# Patient Record
Sex: MALE | Race: BLACK OR AFRICAN AMERICAN | Employment: FULL TIME | URBAN - METROPOLITAN AREA
[De-identification: names, ages, dates, MRNs, and addresses within clinical notes are randomized per-mention and may not be internally consistent; named-entity substitution may affect disease eponyms.]

---

## 2017-03-13 ENCOUNTER — HOSPITAL ENCOUNTER (INPATIENT)
Age: 42
LOS: 3 days | Discharge: HOME | DRG: 842 | End: 2017-03-16
Attending: EMERGENCY MEDICINE | Admitting: FAMILY MEDICINE
Payer: COMMERCIAL

## 2017-03-13 ENCOUNTER — APPOINTMENT (OUTPATIENT)
Dept: GENERAL RADIOLOGY | Age: 42
DRG: 842 | End: 2017-03-13
Attending: PHYSICIAN ASSISTANT
Payer: COMMERCIAL

## 2017-03-13 ENCOUNTER — APPOINTMENT (OUTPATIENT)
Dept: CT IMAGING | Age: 42
DRG: 842 | End: 2017-03-13
Attending: FAMILY MEDICINE
Payer: COMMERCIAL

## 2017-03-13 DIAGNOSIS — M25.461 KNEE EFFUSION, RIGHT: ICD-10-CM

## 2017-03-13 DIAGNOSIS — D72.829 LEUKOCYTOSIS, UNSPECIFIED TYPE: Primary | ICD-10-CM

## 2017-03-13 PROBLEM — R16.1 SPLENOMEGALY: Status: ACTIVE | Noted: 2017-03-13

## 2017-03-13 PROBLEM — D47.1 MYELOPROLIFERATIVE DISORDER (HCC): Status: ACTIVE | Noted: 2017-03-13

## 2017-03-13 PROBLEM — D64.9 ANEMIA: Status: ACTIVE | Noted: 2017-03-13

## 2017-03-13 LAB
ABO + RH BLD: NORMAL
ALBUMIN SERPL BCP-MCNC: 3.3 G/DL (ref 3.4–5)
ALBUMIN/GLOB SERPL: 0.8 {RATIO} (ref 0.8–1.7)
ALP SERPL-CCNC: 90 U/L (ref 45–117)
ALT SERPL-CCNC: 20 U/L (ref 16–61)
ANION GAP BLD CALC-SCNC: 5 MMOL/L (ref 3–18)
APPEARANCE SNV: ABNORMAL
APPEARANCE UR: CLEAR
APTT PPP: 42.3 SEC (ref 23–36.4)
AST SERPL W P-5'-P-CCNC: 19 U/L (ref 15–37)
BACTERIA URNS QL MICRO: ABNORMAL /HPF
BASOPHILS # BLD AUTO: 0 K/UL (ref 0–0.06)
BASOPHILS # BLD: 0 % (ref 0–3)
BILIRUB DIRECT SERPL-MCNC: <0.1 MG/DL (ref 0–0.2)
BILIRUB SERPL-MCNC: 0.3 MG/DL (ref 0.2–1)
BILIRUB UR QL: NEGATIVE
BLASTS NFR BLD: 5 %
BLOOD GROUP ANTIBODIES SERPL: NORMAL
BUN SERPL-MCNC: 16 MG/DL (ref 7–18)
BUN/CREAT SERPL: 9 (ref 12–20)
CALCIUM SERPL-MCNC: 8.5 MG/DL (ref 8.5–10.1)
CHLORIDE SERPL-SCNC: 105 MMOL/L (ref 100–108)
CO2 SERPL-SCNC: 30 MMOL/L (ref 21–32)
COLOR SNV: YELLOW
COLOR UR: YELLOW
CREAT SERPL-MCNC: 1.77 MG/DL (ref 0.6–1.3)
DIFFERENTIAL METHOD BLD: ABNORMAL
EOSINOPHIL # BLD: 0 K/UL (ref 0–0.4)
EOSINOPHIL # FLD: 0 %
EOSINOPHIL NFR BLD: 0 % (ref 0–5)
EPITH CASTS URNS QL MICRO: ABNORMAL /LPF (ref 0–5)
ERYTHROCYTE [DISTWIDTH] IN BLOOD BY AUTOMATED COUNT: 19 % (ref 11.6–14.5)
GLOBULIN SER CALC-MCNC: 4.2 G/DL (ref 2–4)
GLUCOSE SERPL-MCNC: 83 MG/DL (ref 74–99)
GLUCOSE UR STRIP.AUTO-MCNC: NEGATIVE MG/DL
HCT VFR BLD AUTO: 26.9 % (ref 36–48)
HGB BLD-MCNC: 9.3 G/DL (ref 13–16)
HGB UR QL STRIP: NEGATIVE
INR PPP: 1.4 (ref 0.8–1.2)
KETONES UR QL STRIP.AUTO: NEGATIVE MG/DL
LACTATE BLD-SCNC: 1.4 MMOL/L (ref 0.4–2)
LEUKOCYTE ESTERASE UR QL STRIP.AUTO: NEGATIVE
LYMPHOCYTES # BLD AUTO: 2 % (ref 20–51)
LYMPHOCYTES # BLD: 7.8 K/UL (ref 0.8–3.5)
LYMPHOCYTES NFR FLD: 0 %
MAGNESIUM SERPL-MCNC: 2.1 MG/DL (ref 1.8–2.4)
MCH RBC QN AUTO: 30.4 PG (ref 24–34)
MCHC RBC AUTO-ENTMCNC: 34.6 G/DL (ref 31–37)
MCV RBC AUTO: 87.9 FL (ref 74–97)
METAMYELOCYTES NFR BLD MANUAL: 12 %
MONOCYTES # BLD: 7.8 K/UL (ref 0–1)
MONOCYTES NFR BLD AUTO: 2 % (ref 2–9)
MONOCYTES NFR FLD: 5 %
MYELOCYTES NFR BLD MANUAL: 18 %
NEUTS BAND # FLD: 0 %
NEUTS BAND NFR BLD MANUAL: 48 % (ref 0–5)
NEUTS SEG # BLD: 203.8 K/UL (ref 1.8–8)
NEUTS SEG NFR BLD AUTO: 4 % (ref 42–75)
NEUTS SEG NFR FLD: 95 %
NITRITE UR QL STRIP.AUTO: NEGATIVE
PERIPHERAL SMEAR,PSM: NORMAL
PH UR STRIP: 6.5 [PH] (ref 5–8)
PLATELET # BLD AUTO: 525 K/UL (ref 135–420)
PLATELET COMMENTS,PCOM: ABNORMAL
PMV BLD AUTO: 12.6 FL (ref 9.2–11.8)
POTASSIUM SERPL-SCNC: 3.6 MMOL/L (ref 3.5–5.5)
PROMYELOCYTES NFR BLD MANUAL: 9 %
PROT SERPL-MCNC: 7.5 G/DL (ref 6.4–8.2)
PROT UR STRIP-MCNC: 100 MG/DL
PROTHROMBIN TIME: 16.2 SEC (ref 11.5–15.2)
RBC # BLD AUTO: 3.06 M/UL (ref 4.7–5.5)
RBC # SNV: 750 /CU MM
RBC #/AREA URNS HPF: ABNORMAL /HPF (ref 0–5)
RBC MORPH BLD: ABNORMAL
SODIUM SERPL-SCNC: 140 MMOL/L (ref 136–145)
SP GR UR REFRACTOMETRY: 1.02 (ref 1–1.03)
SPECIMEN EXP DATE BLD: NORMAL
SPECIMEN SITE: ABNORMAL
URATE SERPL-MCNC: 8.9 MG/DL (ref 2.6–7.2)
UROBILINOGEN UR QL STRIP.AUTO: 1 EU/DL (ref 0.2–1)
WBC # BLD AUTO: 392 K/UL (ref 4.6–13.2)
WBC # SNV: ABNORMAL /CU MM (ref 0–200)
WBC MORPH BLD: ABNORMAL
WBC URNS QL MICRO: ABNORMAL /HPF (ref 0–4)

## 2017-03-13 PROCEDURE — 85730 THROMBOPLASTIN TIME PARTIAL: CPT | Performed by: PHYSICIAN ASSISTANT

## 2017-03-13 PROCEDURE — 75810000054 HC ARTHOCENTISIS JOINT

## 2017-03-13 PROCEDURE — 85610 PROTHROMBIN TIME: CPT | Performed by: PHYSICIAN ASSISTANT

## 2017-03-13 PROCEDURE — 80048 BASIC METABOLIC PNL TOTAL CA: CPT | Performed by: PHYSICIAN ASSISTANT

## 2017-03-13 PROCEDURE — 83735 ASSAY OF MAGNESIUM: CPT | Performed by: PHYSICIAN ASSISTANT

## 2017-03-13 PROCEDURE — 96374 THER/PROPH/DIAG INJ IV PUSH: CPT

## 2017-03-13 PROCEDURE — 83605 ASSAY OF LACTIC ACID: CPT

## 2017-03-13 PROCEDURE — 84550 ASSAY OF BLOOD/URIC ACID: CPT | Performed by: PHYSICIAN ASSISTANT

## 2017-03-13 PROCEDURE — 71260 CT THORAX DX C+: CPT

## 2017-03-13 PROCEDURE — 0S9C3ZZ DRAINAGE OF RIGHT KNEE JOINT, PERCUTANEOUS APPROACH: ICD-10-PCS | Performed by: EMERGENCY MEDICINE

## 2017-03-13 PROCEDURE — 87040 BLOOD CULTURE FOR BACTERIA: CPT | Performed by: PHYSICIAN ASSISTANT

## 2017-03-13 PROCEDURE — 84560 ASSAY OF URINE/URIC ACID: CPT | Performed by: PHYSICIAN ASSISTANT

## 2017-03-13 PROCEDURE — 73562 X-RAY EXAM OF KNEE 3: CPT

## 2017-03-13 PROCEDURE — 71020 XR CHEST PA LAT: CPT

## 2017-03-13 PROCEDURE — 85025 COMPLETE CBC W/AUTO DIFF WBC: CPT | Performed by: PHYSICIAN ASSISTANT

## 2017-03-13 PROCEDURE — 96375 TX/PRO/DX INJ NEW DRUG ADDON: CPT

## 2017-03-13 PROCEDURE — 74011250636 HC RX REV CODE- 250/636: Performed by: FAMILY MEDICINE

## 2017-03-13 PROCEDURE — 74011636320 HC RX REV CODE- 636/320: Performed by: FAMILY MEDICINE

## 2017-03-13 PROCEDURE — 65270000029 HC RM PRIVATE

## 2017-03-13 PROCEDURE — 86900 BLOOD TYPING SEROLOGIC ABO: CPT | Performed by: PHYSICIAN ASSISTANT

## 2017-03-13 PROCEDURE — 99283 EMERGENCY DEPT VISIT LOW MDM: CPT

## 2017-03-13 PROCEDURE — 81001 URINALYSIS AUTO W/SCOPE: CPT | Performed by: PHYSICIAN ASSISTANT

## 2017-03-13 PROCEDURE — 74011250636 HC RX REV CODE- 250/636

## 2017-03-13 PROCEDURE — 80076 HEPATIC FUNCTION PANEL: CPT | Performed by: PHYSICIAN ASSISTANT

## 2017-03-13 PROCEDURE — 87070 CULTURE OTHR SPECIMN AEROBIC: CPT | Performed by: PHYSICIAN ASSISTANT

## 2017-03-13 PROCEDURE — 74011250636 HC RX REV CODE- 250/636: Performed by: PHYSICIAN ASSISTANT

## 2017-03-13 PROCEDURE — 89050 BODY FLUID CELL COUNT: CPT | Performed by: EMERGENCY MEDICINE

## 2017-03-13 RX ORDER — SODIUM CHLORIDE 9 MG/ML
1000 INJECTION, SOLUTION INTRAVENOUS ONCE
Status: COMPLETED | OUTPATIENT
Start: 2017-03-13 | End: 2017-03-13

## 2017-03-13 RX ORDER — MORPHINE SULFATE 4 MG/ML
4 INJECTION, SOLUTION INTRAMUSCULAR; INTRAVENOUS ONCE
Status: COMPLETED | OUTPATIENT
Start: 2017-03-13 | End: 2017-03-13

## 2017-03-13 RX ORDER — SODIUM CHLORIDE 9 MG/ML
50 INJECTION, SOLUTION INTRAVENOUS CONTINUOUS
Status: DISCONTINUED | OUTPATIENT
Start: 2017-03-13 | End: 2017-03-16

## 2017-03-13 RX ORDER — ONDANSETRON 2 MG/ML
INJECTION INTRAMUSCULAR; INTRAVENOUS
Status: COMPLETED
Start: 2017-03-13 | End: 2017-03-13

## 2017-03-13 RX ORDER — ONDANSETRON 2 MG/ML
4 INJECTION INTRAMUSCULAR; INTRAVENOUS
Status: COMPLETED | OUTPATIENT
Start: 2017-03-13 | End: 2017-03-13

## 2017-03-13 RX ADMIN — ONDANSETRON 4 MG: 2 INJECTION INTRAMUSCULAR; INTRAVENOUS at 13:04

## 2017-03-13 RX ADMIN — IOVERSOL 80 ML: 678 INJECTION INTRA-ARTERIAL; INTRAVENOUS at 15:37

## 2017-03-13 RX ADMIN — SODIUM CHLORIDE 75 ML/HR: 900 INJECTION, SOLUTION INTRAVENOUS at 18:34

## 2017-03-13 RX ADMIN — Medication 4 MG: at 13:04

## 2017-03-13 RX ADMIN — SODIUM CHLORIDE 1000 ML: 9 INJECTION, SOLUTION INTRAVENOUS at 14:20

## 2017-03-13 NOTE — H&P
History and Physical    Patient: Mare Hawkins MRN: [id number]  SSN: [id number]    YOB: 1975  Age: 42 y.o. Sex: male      Subjective:      Mare Hawkins is a 42 y.o. male who has been losing weight up jude 60 lbs despite good appetite. Patient began with right knee swelling 2 days ago without trauma. Patient presented to the ER. On CBC, patient has WBC > 300. Workup suggestive of myeloproliferative disease acute Admit. Past Medical History:   Diagnosis Date    STD (sexually transmitted disease)      Past Surgical History:   Procedure Laterality Date    HX KNEE ARTHROSCOPY        Family History   Problem Relation Age of Onset    Hypertension Maternal Grandmother      Social History   Substance Use Topics    Smoking status: Current Every Day Smoker     Packs/day: 0.25     Years: 8.00     Types: Cigarettes    Smokeless tobacco: Never Used    Alcohol use No      Prior to Admission medications    Not on File        No Known Allergies    Review of Systems:  A comprehensive review of systems was negative except for that written in the History of Present Illness. Objective:     Vitals:    03/13/17 1116   BP: 153/80   Pulse: 93   Resp: 20   Temp: 98.4 °F (36.9 °C)   SpO2: 97%   Weight: 104.3 kg (230 lb)   Height: 6' 8\" (2.032 m)        Physical Exam:  GENERAL: alert, cooperative, no distress, appears stated age  LYMPHATIC: Cervical, supraclavicular, and axillary nodes normal.   THROAT & NECK: normal and no erythema or exudates noted.    LUNG: clear to auscultation bilaterally  HEART: regular rate and rhythm, S1, S2 normal, no murmur, click, rub or gallop  ABDOMEN: abnormal findings:  splenomegaly  EXTREMITIES:  Right knee with effusion    Assessment:     Hospital Problems  Date Reviewed: 3/13/2017          Codes Class Noted POA    Leukocytosis ICD-10-CM: K73.095  ICD-9-CM: 288.60  3/13/2017 Unknown        Knee effusion, right ICD-10-CM: M25.461  ICD-9-CM: 719.06  3/13/2017 Unknown Myeloproliferative disorder [facility]) ICD-10-CM: D47.1  ICD-9-CM: 238.79  3/13/2017 Unknown        Anemia ICD-10-CM: D64.9  ICD-9-CM: 285.9  3/13/2017 Unknown        Splenomegaly ICD-10-CM: R16.1  ICD-9-CM: 789.2  3/13/2017 Unknown              Plan:     Admit. Hematology consulted. Will need bone marrow biopsy.       Signed By: Evie Feng MD     March 13, 2017

## 2017-03-13 NOTE — ED PROVIDER NOTES
HPI Comments: 11:37 AM Drew Nixon is a 42 y.o. male with hx of gout presenting to the ED with R knee pain that began this morning. The patient also reports R knee swelling as an associated symptom. He denies any recent injury or trauma and rates the pain as 9/10. He states the knee was throbbing yesterday and he took a \"pain pill. \" Pt denies fever. He states he had an arthroscopy on the knee in 2014. He has had this swelling before, the knee was swollen one week ago and the swelling subsided. No other complaints at this time. Patient is a 42 y.o. male presenting with knee pain and knee swelling. The history is provided by the patient. Knee Pain    Pertinent negatives include no back pain. Knee Swelling    Pertinent negatives include no back pain. Past Medical History:   Diagnosis Date    STD (sexually transmitted disease)        Past Surgical History:   Procedure Laterality Date    HX KNEE ARTHROSCOPY           Family History:   Problem Relation Age of Onset    Hypertension Maternal Grandmother        Social History     Social History    Marital status: SINGLE     Spouse name: N/A    Number of children: N/A    Years of education: N/A     Occupational History    Not on file. Social History Main Topics    Smoking status: Current Every Day Smoker     Packs/day: 0.25     Years: 8.00     Types: Cigarettes    Smokeless tobacco: Never Used    Alcohol use No    Drug use: No    Sexual activity: Not on file     Other Topics Concern    Not on file     Social History Narrative    No narrative on file         ALLERGIES: Review of patient's allergies indicates no known allergies. Review of Systems   Constitutional: Negative for chills, fatigue, fever and unexpected weight change. HENT: Negative for congestion and rhinorrhea. Respiratory: Negative for chest tightness and shortness of breath. Cardiovascular: Negative for chest pain, palpitations and leg swelling. Gastrointestinal: Negative for abdominal pain, nausea and vomiting. Genitourinary: Negative for dysuria. Musculoskeletal: Positive for arthralgias (R knee pain and swelling) and joint swelling. Negative for back pain. Skin: Negative for rash. Neurological: Negative for dizziness and weakness. Psychiatric/Behavioral: The patient is not nervous/anxious. All other systems reviewed and are negative. Vitals:    03/13/17 1116   BP: 153/80   Pulse: 93   Resp: 20   Temp: 98.4 °F (36.9 °C)   SpO2: 97%   Weight: 104.3 kg (230 lb)   Height: 6' 8\" (2.032 m)            Physical Exam   Constitutional: He is oriented to person, place, and time. He appears well-developed and well-nourished. No distress. HENT:   Head: Normocephalic and atraumatic. Right Ear: External ear normal.   Left Ear: External ear normal.   Nose: Nose normal.   Mouth/Throat: Oropharynx is clear and moist.   Eyes: Conjunctivae and EOM are normal. Pupils are equal, round, and reactive to light. No scleral icterus. Neck: Normal range of motion. Neck supple. No JVD present. No tracheal deviation present. No thyromegaly present. Cardiovascular: Normal rate, regular rhythm, normal heart sounds and intact distal pulses. Exam reveals no gallop and no friction rub. No murmur heard. Pulmonary/Chest: Effort normal and breath sounds normal. He exhibits no tenderness. Abdominal: Soft. Bowel sounds are normal. He exhibits no distension. There is no tenderness. There is no rebound and no guarding. Musculoskeletal:        Right knee: He exhibits decreased range of motion, swelling and effusion. He exhibits no ecchymosis, no deformity, no laceration, no erythema, normal alignment and no LCL laxity. Tenderness found. Lymphadenopathy:     He has no cervical adenopathy. Neurological: He is alert and oriented to person, place, and time. No cranial nerve deficit.  Coordination normal.   No sensory loss, Gait normal, Motor 5/5   Skin: Skin is warm and dry. Psychiatric: He has a normal mood and affect. His behavior is normal. Judgment and thought content normal.   Nursing note and vitals reviewed. MDM  Number of Diagnoses or Management Options  Knee effusion, right:   Leukocytosis, unspecified type:   Diagnosis management comments: Pt presents with right knee swelling, ddx include but not limited to: djd, gout, bursitis. Pt with knee swelling x last night. Pt states he has gout, but has not ingested any usual gout triggers. He has had knee tapped before and is requesting the same. Will check labs, give pain meds, and tap knee. Discussed wth Dr. Geraldine Melchor and he agrees with plan. MICHEL Devine    2:03 PM Received \"panic value\" of . Discussed results with pt. Pt states he is healthy as far as he knows and gets physicals yearly. Pt does admit to 60lbs unintentional weight loss over the past 6 months and admits to intermittent chills. Additional labs and cultures ordered, pt moved to [address] [place] [address] and will call Dr. Wei Regan for admission. Pt agrees with plan. MICHEL Devine    2:13 PM Discussed with Dr Kaylynn Santos, will admit. Requests heme/onc consult. MICHEL Devine    2:29 PM Discussed with Dr. Mart Durbin, will consult.  MICHEL Devine           Amount and/or Complexity of Data Reviewed  Clinical lab tests: ordered and reviewed  Tests in the radiology section of CPT®: ordered and reviewed    Risk of Complications, Morbidity, and/or Mortality  Presenting problems: moderate  Diagnostic procedures: moderate  Management options: moderate    Patient Progress  Patient progress: stable    ED Course       Other Procedure  Date/Time: 3/13/2017 1:57 PM  Performed by: Lisa Guidry  Authorized by: Lisa Guidry     Consent:     Consent obtained:  Written    Consent given by:  Patient    Risks discussed:  Bleeding, infection, pain and incomplete drainage    Alternatives discussed:  Referral  Indications:     Indications:  Right knee swelling  Pre-procedure details:     Skin preparation:  ChloraPrep    Preparation: Patient was prepped and draped in the usual sterile fashion    Anesthesia (see MAR for exact dosages): Anesthesia method:  Local infiltration    Local anesthetic:  Lidocaine 1% w/o epi  Post-procedure details:     Patient tolerance of procedure: Tolerated well, no immediate complications  Comments:      60cc of straw colored fluid aspirated        Vitals:  Patient Vitals for the past 12 hrs:   Temp Pulse Resp BP SpO2   03/13/17 1116 98.4 °F (36.9 °C) 93 20 153/80 97 %       Medications ordered:   Medications - No data to display      Lab findings:  No results found for this or any previous visit (from the past 12 hour(s)). EKG interpretation by ED Physician:      X-Ray, CT or other radiology findings or impressions:  No orders to display       Progress notes, Consult notes or additional Procedure notes:       Disposition:  Diagnosis: No diagnosis found. Disposition:     Follow-up Information     None           Patient's Medications    No medications on file       SCRIBE ATTESTATION STATEMENT  Documented by: Kristina broderick for, and in the presence of, MICHEL Davis     Signed by: Tim Loera, 03/13/17, 11:45 AM    PROVIDER ATTESTATION STATEMENT  I personally performed the services described in the documentation, reviewed the documentation, as recorded by the scribe in my presence, and it accurately and completely records my words and actions.   MICHEL Davis

## 2017-03-13 NOTE — ED TRIAGE NOTES
Patient to ER with c/o right knee pain and swelling, states he woke up this morning with symptoms. Denies any increase in activity or trauma. Ambulatory on arrival. Rates pain 9/10. Denies any allergies.

## 2017-03-14 ENCOUNTER — APPOINTMENT (OUTPATIENT)
Dept: INTERVENTIONAL RADIOLOGY/VASCULAR | Age: 42
DRG: 842 | End: 2017-03-14
Attending: INTERNAL MEDICINE
Payer: COMMERCIAL

## 2017-03-14 LAB
BASOPHILS # BLD AUTO: 3.4 K/UL (ref 0–0.06)
BASOPHILS # BLD: 1 % (ref 0–3)
BLASTS NFR BLD: 6 %
DIFFERENTIAL METHOD BLD: ABNORMAL
EOSINOPHIL # BLD: 6.8 K/UL (ref 0–0.4)
EOSINOPHIL NFR BLD: 2 % (ref 0–5)
ERYTHROCYTE [DISTWIDTH] IN BLOOD BY AUTOMATED COUNT: 19 % (ref 11.6–14.5)
HCT VFR BLD AUTO: 23.2 % (ref 36–48)
HGB BLD-MCNC: 7.9 G/DL (ref 13–16)
LYMPHOCYTES # BLD AUTO: 3 % (ref 20–51)
LYMPHOCYTES # BLD: 10.2 K/UL (ref 0.8–3.5)
MCH RBC QN AUTO: 30 PG (ref 24–34)
MCHC RBC AUTO-ENTMCNC: 34.1 G/DL (ref 31–37)
MCV RBC AUTO: 88.2 FL (ref 74–97)
METAMYELOCYTES NFR BLD MANUAL: 14 %
MONOCYTES # BLD: 6.8 K/UL (ref 0–1)
MONOCYTES NFR BLD AUTO: 2 % (ref 2–9)
MYELOCYTES NFR BLD MANUAL: 11 %
NEUTS BAND NFR BLD MANUAL: 39 % (ref 0–5)
NEUTS SEG # BLD: 170.7 K/UL (ref 1.8–8)
NEUTS SEG NFR BLD AUTO: 11 % (ref 42–75)
NRBC BLD-RTO: 1 PER 100 WBC
PLATELET # BLD AUTO: 493 K/UL (ref 135–420)
PLATELET COMMENTS,PCOM: ABNORMAL
PMV BLD AUTO: 12.1 FL (ref 9.2–11.8)
PROMYELOCYTES NFR BLD MANUAL: 11 %
RBC # BLD AUTO: 2.63 M/UL (ref 4.7–5.5)
RBC MORPH BLD: ABNORMAL
RBC MORPH BLD: ABNORMAL
URATE FLD-MCNC: 9.5 MG/DL
WBC # BLD AUTO: 341.3 K/UL (ref 4.6–13.2)

## 2017-03-14 PROCEDURE — 77030028872 HC BN BIOP NDL ON CNTRL TY TELE -C

## 2017-03-14 PROCEDURE — 74011250636 HC RX REV CODE- 250/636

## 2017-03-14 PROCEDURE — 88311 DECALCIFY TISSUE: CPT | Performed by: RADIOLOGY

## 2017-03-14 PROCEDURE — 74011250636 HC RX REV CODE- 250/636: Performed by: FAMILY MEDICINE

## 2017-03-14 PROCEDURE — 77002 NEEDLE LOCALIZATION BY XRAY: CPT

## 2017-03-14 PROCEDURE — 88184 FLOWCYTOMETRY/ TC 1 MARKER: CPT | Performed by: RADIOLOGY

## 2017-03-14 PROCEDURE — 77030003472 HC NDL BIOP COOK -B

## 2017-03-14 PROCEDURE — 74011250637 HC RX REV CODE- 250/637: Performed by: FAMILY MEDICINE

## 2017-03-14 PROCEDURE — 88305 TISSUE EXAM BY PATHOLOGIST: CPT | Performed by: RADIOLOGY

## 2017-03-14 PROCEDURE — 77010033678 HC OXYGEN DAILY

## 2017-03-14 PROCEDURE — 88185 FLOWCYTOMETRY/TC ADD-ON: CPT | Performed by: RADIOLOGY

## 2017-03-14 PROCEDURE — 65270000029 HC RM PRIVATE

## 2017-03-14 PROCEDURE — 88313 SPECIAL STAINS GROUP 2: CPT | Performed by: RADIOLOGY

## 2017-03-14 PROCEDURE — 85025 COMPLETE CBC W/AUTO DIFF WBC: CPT | Performed by: FAMILY MEDICINE

## 2017-03-14 PROCEDURE — 74011000250 HC RX REV CODE- 250

## 2017-03-14 PROCEDURE — 36415 COLL VENOUS BLD VENIPUNCTURE: CPT | Performed by: FAMILY MEDICINE

## 2017-03-14 PROCEDURE — 77030003666 HC NDL SPINAL BD -A

## 2017-03-14 PROCEDURE — 88264 CHROMOSOME ANALYSIS 20-25: CPT | Performed by: RADIOLOGY

## 2017-03-14 PROCEDURE — 07DR3ZX EXTRACTION OF ILIAC BONE MARROW, PERCUTANEOUS APPROACH, DIAGNOSTIC: ICD-10-PCS | Performed by: RADIOLOGY

## 2017-03-14 PROCEDURE — 88237 TISSUE CULTURE BONE MARROW: CPT | Performed by: RADIOLOGY

## 2017-03-14 PROCEDURE — 77030022017 HC DRSG HEMO QCLOT ZMED -A

## 2017-03-14 PROCEDURE — 81206 BCR/ABL1 GENE MAJOR BP: CPT | Performed by: INTERNAL MEDICINE

## 2017-03-14 RX ORDER — LIDOCAINE HYDROCHLORIDE 10 MG/ML
INJECTION, SOLUTION EPIDURAL; INFILTRATION; INTRACAUDAL; PERINEURAL
Status: COMPLETED
Start: 2017-03-14 | End: 2017-03-14

## 2017-03-14 RX ORDER — FENTANYL CITRATE 50 UG/ML
INJECTION, SOLUTION INTRAMUSCULAR; INTRAVENOUS
Status: COMPLETED
Start: 2017-03-14 | End: 2017-03-14

## 2017-03-14 RX ORDER — TRAMADOL HYDROCHLORIDE 50 MG/1
50 TABLET ORAL
Status: DISCONTINUED | OUTPATIENT
Start: 2017-03-14 | End: 2017-03-16 | Stop reason: HOSPADM

## 2017-03-14 RX ORDER — LANOLIN ALCOHOL/MO/W.PET/CERES
3 CREAM (GRAM) TOPICAL
Status: DISCONTINUED | OUTPATIENT
Start: 2017-03-14 | End: 2017-03-16 | Stop reason: HOSPADM

## 2017-03-14 RX ORDER — MIDAZOLAM HYDROCHLORIDE 1 MG/ML
1 INJECTION, SOLUTION INTRAMUSCULAR; INTRAVENOUS
Status: DISCONTINUED | OUTPATIENT
Start: 2017-03-14 | End: 2017-03-16 | Stop reason: HOSPADM

## 2017-03-14 RX ORDER — FENTANYL CITRATE 50 UG/ML
12.5-5 INJECTION, SOLUTION INTRAMUSCULAR; INTRAVENOUS
Status: DISCONTINUED | OUTPATIENT
Start: 2017-03-14 | End: 2017-03-16 | Stop reason: HOSPADM

## 2017-03-14 RX ORDER — MIDAZOLAM HYDROCHLORIDE 1 MG/ML
INJECTION, SOLUTION INTRAMUSCULAR; INTRAVENOUS
Status: COMPLETED
Start: 2017-03-14 | End: 2017-03-14

## 2017-03-14 RX ADMIN — MIDAZOLAM HYDROCHLORIDE 1 MG: 1 INJECTION, SOLUTION INTRAMUSCULAR; INTRAVENOUS at 10:15

## 2017-03-14 RX ADMIN — MIDAZOLAM HYDROCHLORIDE 1 MG: 1 INJECTION, SOLUTION INTRAMUSCULAR; INTRAVENOUS at 10:20

## 2017-03-14 RX ADMIN — TRAMADOL HYDROCHLORIDE 50 MG: 50 TABLET, FILM COATED ORAL at 07:42

## 2017-03-14 RX ADMIN — LIDOCAINE HYDROCHLORIDE: 10 INJECTION, SOLUTION EPIDURAL; INFILTRATION; INTRACAUDAL; PERINEURAL at 10:15

## 2017-03-14 RX ADMIN — FENTANYL CITRATE 50 MCG: 50 INJECTION INTRAMUSCULAR; INTRAVENOUS at 10:15

## 2017-03-14 RX ADMIN — TRAMADOL HYDROCHLORIDE 50 MG: 50 TABLET, FILM COATED ORAL at 13:56

## 2017-03-14 RX ADMIN — TRAMADOL HYDROCHLORIDE 50 MG: 50 TABLET, FILM COATED ORAL at 21:13

## 2017-03-14 RX ADMIN — TRAMADOL HYDROCHLORIDE 50 MG: 50 TABLET, FILM COATED ORAL at 02:36

## 2017-03-14 RX ADMIN — FENTANYL CITRATE 50 MCG: 50 INJECTION, SOLUTION INTRAMUSCULAR; INTRAVENOUS at 10:20

## 2017-03-14 RX ADMIN — SODIUM CHLORIDE 75 ML/HR: 900 INJECTION, SOLUTION INTRAVENOUS at 21:14

## 2017-03-14 RX ADMIN — SODIUM CHLORIDE 75 ML/HR: 900 INJECTION, SOLUTION INTRAVENOUS at 05:33

## 2017-03-14 RX ADMIN — FENTANYL CITRATE 50 MCG: 50 INJECTION, SOLUTION INTRAMUSCULAR; INTRAVENOUS at 10:15

## 2017-03-14 RX ADMIN — MELATONIN TAB 3 MG 3 MG: 3 TAB at 21:14

## 2017-03-14 NOTE — INTERDISCIPLINARY ROUNDS
Interdisciplinary Round Note   Patient Information: Patient Information: Tanisha Olivares                                      465/01   Reason for Admission: Leukocytosis  Knee effusion, right   Attending Provider:   Claudia Cloud MD  Primary Care Physician:       Claudia Cloud MD       [phone]   Past Medical History:   Past Medical History:   Diagnosis Date    STD (sexually transmitted disease)       Hospital day: 1  Estimated discharge date: 3/18/17  RRAT Score: Low Risk            4       Total Score        4 More than 1 Admission in calendar year        Criteria that do not apply:    Relationship with PCP    Patient Living Status    Patient Length of Stay > 5    Patient Insurance is Medicare, Medicaid or Self Pay    Charlson Comorbidity Score       Goals for Today: biopsy and relief from knee pain    Overnight Events: none     VITAL SIGNS  Vitals:    03/14/17 1020 03/14/17 1025 03/14/17 1158 03/14/17 1457   BP:  128/88 119/80 111/73   Pulse:  77 76 71   Resp:  25 22 21   Temp:   97.8 °F (36.6 °C) 97.4 °F (36.3 °C)   SpO2: 99% 99% 97% 97%   Weight:       Height:              Lines, Drains, & Airways    Peripheral IV 03/13/17 Right Antecubital-Site Assessment: Clean, dry, & intact       VTE Prophylaxis                                   Intake and Output:      03/14 0701 - 03/14 1900  In: 240 [P.O.:240]  Out: -  Current Diet: DIET REGULAR       Abdominal   Abdominal Assessment: Soft  GI Prophylaxis: no        Type:         Recent Glucose Results: No results found for: GLU, GLUPOC, GLUCPOC       IV Antibiotics? no       When started: Activity Level: Activity Level: Bed Rest, Up ad leonela  Needs assistance with ADLs: no  PT Consult Status:  Current Immunizations: There is no immunization history for the selected administration types on file for this patient.        Recommendations:   Discharge Disposition: Home Independent    Needs for Discharge:  follow up with oncology/hemalogy Recommendations from IDR team: pending test results    Other Notes: awaiting biopsy results

## 2017-03-14 NOTE — PROGRESS NOTES
TRANSFER - OUT REPORT:    Verbal report given to Ramirez Cline RN(name) on Scot Zacarias  being transferred to N(unit) for routine post - op       Report consisted of patients Situation, Background, Assessment and   Recommendations(SBAR). Information from the following report(s) SBAR, Kardex, Procedure Summary and MAR was reviewed with the receiving nurse. Lines:   Peripheral IV 03/13/17 Right Antecubital (Active)   Site Assessment Clean, dry, & intact 3/13/2017  7:33 PM   Phlebitis Assessment 0 3/13/2017  7:33 PM   Infiltration Assessment 0 3/13/2017  7:33 PM   Dressing Status Clean, dry, & intact 3/13/2017  7:33 PM   Hub Color/Line Status Pink 3/13/2017  7:33 PM        Opportunity for questions and clarification was provided.       Patient transported with:   [facility]

## 2017-03-14 NOTE — PROGRESS NOTES
Progress Note    Patient: Dave Ray MRN: [id number]  SSN: [id number]    YOB: 1975  Age: 42 y.o. Sex: male      Admit Date: 3/13/2017    LOS: 1 day     Subjective:     Patient admitted with knee swelling with finding of anemia and extreme leukocytosis   Probably acute leukemia. No acute complaints. Patient had bone marrow biopsy today. Objective:     Vitals:    03/14/17 1015 03/14/17 1020 03/14/17 1025 03/14/17 1158   BP: (!) 133/91  128/88 119/80   Pulse: 75  77 76   Resp: 22 25 22   Temp:    97.8 °F (36.6 °C)   SpO2: 99% 99% 99% 97%   Weight:       Height:            Intake and Output:  Current Shift:    Last three shifts:      Physical Exam:   GENERAL: alert, cooperative, no distress, appears stated age  LUNG: clear to auscultation bilaterally  HEART: regular rate and rhythm, S1, S2 normal, no murmur, click, rub or gallop  ABDOMEN: abnormal findings:  splenomegaly    Lab/Data Review: All lab results for the last 24 hours reviewed. Wbc 350  hct 23.5  CT splenomegaly, otherwise unremarkable. Assessment:     Active Problems:    Leukocytosis (3/13/2017)      Knee effusion, right (3/13/2017)      Myeloproliferative disorder ([address] (3/13/2017)      Anemia (3/13/2017)      Splenomegaly (3/13/2017)        Plan:   Bone marrow biopsy today. Workup per oncology.     Signed By: Mindy Marley MD     March 14, 2017

## 2017-03-14 NOTE — ROUTINE PROCESS
Received patient from radiology alert dressing intact to lt, sacral area. dressingdry intact. Family had several questions about knee swelling being related to blood problem, Replyed could not answer till test results return.  Paged Dr. Kaylynn Santos to speak with family

## 2017-03-14 NOTE — CONSULTS
Consult Note    Patient: Terrell Shah               Sex: male          DOA: 3/13/2017         YOB: 1975      Age:  42 y.o.        LOS:  LOS: 1 day              HPI:     Terrell Shah is a 42 y.o. male who has been seen for BM Bx given leucocytosis and splenomegaly. Past Medical History:   Diagnosis Date    STD (sexually transmitted disease)        Past Surgical History:   Procedure Laterality Date    HX KNEE ARTHROSCOPY         Family History   Problem Relation Age of Onset    Hypertension Maternal Grandmother        Social History     Social History    Marital status: SINGLE     Spouse name: N/A    Number of children: N/A    Years of education: N/A     Social History Main Topics    Smoking status: Current Every Day Smoker     Packs/day: 0.25     Years: 8.00     Types: Cigarettes    Smokeless tobacco: Never Used    Alcohol use No    Drug use: No    Sexual activity: Not on file     Other Topics Concern    Not on file     Social History Narrative    No narrative on file       Prior to Admission medications    Not on File       No Known Allergies    Review of Systems  Pertinent items are noted in the History of Present Illness. Physical Exam:      Visit Vitals    /81 (BP 1 Location: Right arm, BP Patient Position: At rest)    Pulse 82    Temp 98.6 °F (37 °C)    Resp 20    Ht 6' 8\" (2.032 m)    Wt 104.3 kg (230 lb)    SpO2 93%    BMI 25.27 kg/m2       Physical Exam:  Physical exam not obtained due to patient factors.     Labs Reviewed:  BMP:   Lab Results   Component Value Date/Time     03/13/2017 01:03 PM    K 3.6 03/13/2017 01:03 PM     03/13/2017 01:03 PM    CO2 30 03/13/2017 01:03 PM    AGAP 5 03/13/2017 01:03 PM    GLU 83 03/13/2017 01:03 PM    BUN 16 03/13/2017 01:03 PM    CREA 1.77 (H) 03/13/2017 01:03 PM    GFRAA 51 (L) 03/13/2017 01:03 PM    GFRNA 42 (L) 03/13/2017 01:03 PM     CMP:   Lab Results   Component Value Date/Time     03/13/2017 01:03 PM    K 3.6 03/13/2017 01:03 PM     03/13/2017 01:03 PM    CO2 30 03/13/2017 01:03 PM    AGAP 5 03/13/2017 01:03 PM    GLU 83 03/13/2017 01:03 PM    BUN 16 03/13/2017 01:03 PM    CREA 1.77 (H) 03/13/2017 01:03 PM    GFRAA 51 (L) 03/13/2017 01:03 PM    GFRNA 42 (L) 03/13/2017 01:03 PM    CA 8.5 03/13/2017 01:03 PM    MG 2.1 03/13/2017 01:03 PM    ALB 3.3 (L) 03/13/2017 01:03 PM    TP 7.5 03/13/2017 01:03 PM    GLOB 4.2 (H) 03/13/2017 01:03 PM    AGRAT 0.8 03/13/2017 01:03 PM    SGOT 19 03/13/2017 01:03 PM    ALT 20 03/13/2017 01:03 PM     CBC:   Lab Results   Component Value Date/Time    .3 (HH) 03/14/2017 03:14 AM    HGB 7.9 (L) 03/14/2017 03:14 AM    HCT 23.2 (L) 03/14/2017 03:14 AM     (H) 03/14/2017 03:14 AM     All Cardiac Markers in the last 24 hours: No results found for: CPK, CKMMB, CKMB, RCK3, CKMBT, CKNDX, CKND1, ALLYSSA, TROPT, TROIQ, LIZZY, TROPT, TNIPOC, BNP, BNPP  Recent Glucose Results:   Lab Results   Component Value Date/Time    GLU 83 03/13/2017 01:03 PM     ABG: No results found for: PH, PHI, PCO2, PCO2I, PO2, PO2I, HCO3, HCO3I, FIO2, FIO2I  COAGS:   Lab Results   Component Value Date/Time    APTT 42.3 (H) 03/13/2017 01:03 PM    PTP 16.2 (H) 03/13/2017 01:03 PM    INR 1.4 (H) 03/13/2017 01:03 PM     Liver Panel:   Lab Results   Component Value Date/Time    ALB 3.3 (L) 03/13/2017 01:03 PM    CBIL <0.1 03/13/2017 01:03 PM    TP 7.5 03/13/2017 01:03 PM    GLOB 4.2 (H) 03/13/2017 01:03 PM    AGRAT 0.8 03/13/2017 01:03 PM    SGOT 19 03/13/2017 01:03 PM    ALT 20 03/13/2017 01:03 PM    AP 90 03/13/2017 01:03 PM     Pancreatic Markers: No results found for: AMYLPOCT, AML, LIPPOCT, LPSE    Assessment/Plan     Active Problems:    Leukocytosis (3/13/2017)      Knee effusion, right (3/13/2017)      Myeloproliferative disorder ([address] (3/13/2017)      Anemia (3/13/2017)      Splenomegaly (3/13/2017)        BM Bx will be done now. NPO   Coags  No blood thinners    Thank you.

## 2017-03-14 NOTE — CONSULTS
[address]    Name:  Valentín De Los Santos  MR#:  [id number]  :  1975  Account #:  [de-identified]  Date of Adm:  2017  Date of Consultation:      REASON FOR CONSULTATION:  Possible lymphoproliferative versus  myeloproliferative disorder. CHIEF COMPLAINT ON ADMISSION:  Swelling of the right knee. HISTORY OF PRESENT ILLNESS:  The patient is a 50-year-old  [facility] American man who came into the emergency room yesterday  complaining that he had some swelling and pain in his right knee that  had been going on since early morning. He also reported having a  history of gout and stated that in the past he underwent an arthroscopy  on the knee in . The patient had an arthrocentesis done in the  emergency room. Subsequent lab data, including a CBC, revealed that  he had a markedly elevated WBC count of 392,000. He was anemic,  with a hemoglobin of 9.3 and a hematocrit of 26.9. His platelet count  was slightly elevated at 525. Subsequent evaluations yesterday in the  emergency room included a CT scan of the chest, abdomen, and  pelvis. This disclosed a massively enlarged spleen. There was a  concern that the patient may have some type of underlying  hematologic malignancy. Therefore, he was admitted, and I was called  to assist the patient in his evaluation. The patient denies having any  antecedent hematologic diagnoses. He reports that he has lost some  weight over the past year. He has noticed increasing abdominal girth  over the past three months. He has denied having any unexplained  fevers or night sweats. He also denies having any recurrent skin  infections or skin rashes. PAST MEDICAL HISTORY  ALLERGIES:  THERE ARE NO KNOWN MEDICATION ALLERGIES. MEDICATIONS AT THE TIME OF ADMISSION:  The patient had been  taking no medications prior to admission. MEDICAL DIAGNOSES:  The patient has a history of an STD and  problems with his right knee.     PAST SURGICAL HISTORY:  Arthroscopy on the right knee. FAMILY HISTORY:  There is a positive family history of hypertension  in his maternal grandmother. SOCIAL HISTORY:  The patient smokes about a half a pack of  cigarettes per day. He denies the use of alcohol or street drugs. REVIEW OF SYSTEMS:  The primary complaints are of the  progressive swelling and discomfort in the right knee. He has also had  some weight loss over the past year and more recently has noticed  increasing abdominal girth. He denies all other complaints on the  multiple organ system review. PHYSICAL EXAMINATION  GENERAL APPEARANCE:  The patient appears comfortable at this  time. He reports that the right knee is feeling better since the fluid was  removed yesterday. VITAL SIGNS:  The most recent vital signs reveal a blood pressure of  137/81, pulse 82, respiratory rate 20, and temperature is 98.6 degrees  Fahrenheit. SKIN:  Examination of his skin shows no bruise or rash. HEENT:  The head is normocephalic and atraumatic. Conjunctivae  and sclerae are clear. Pupils are reactive to light and accommodation. The extraocular movements are intact. ENT reveals no oral mucosal  lesions or ulceration. NECK:  Supple, without lymphadenopathy or thyromegaly. CHEST WALL AND LUNGS:  There is symmetric chest wall  expansion. The lungs are without wheeze or rhonchi. HEART:  The S1 and S2 heart sounds are normal.  There are no  murmurs or gallops. ABDOMEN:  Bowel sounds are present and normal.  The patient has a  markedly enlarged spleen, measuring at least 12 cm below the left  costal margin. The liver is not palpable. GENITOURINARY:  Exam deferred. RECTAL:  Exam deferred. PERIPHERAL LYMPH GLANDS:  There is no palpable cervical,  supraclavicular, axillary, or inguinal lymphadenopathy. EXTREMITIES:  There is no clubbing, cyanosis, or edema. NEUROLOGIC:  There are no focal deficits.   MUSCULOSKELETAL:  The patient has some swelling in the right  knee. He underwent an arthrocentesis yesterday, and a bandage is in  place. There is no evidence of inflammation or infection. LABORATORY DATA:  The a.m. CBC from today showed a WBC  count of 341, hemoglobin of 7.9, hematocrit 23.2%, and a platelet  count of 521,483. The differential WBC count showed 11%  neutrophils, 39% bands, 3% lymphocytes, 2% monocytes, 2%  eosinophils, 1% basophils, 14 metamyelocytes, 11 myelocytes, 11%  promyelocytes, and 6 blasts. The chemistry profile from 03/13/2017 showed a sodium of 140,  potassium of 3.6, chloride of 105, CO2 30, BUN 16, creatinine 1.77,  calcium 8.5, total bilirubin 0.3, direct bilirubin less than 0.1, total   protein  7.5, albumin 3.3, globulin 4.2, ALT 20, AST 19, and alkaline  phosphatase 90. Uric acid was 8.9. A CT scan of the chest, abdomen, and pelvis from 03/13/2017 showed  marked splenomegaly, measuring up to 30 cm maximum. There was a  small volume pelvic fluid, presumably inflammatory or reactive. He  had moderate degenerative disk disease at L4 and L5 and L5 and S1.    ASSESSMENT AND PLAN:  The patient is a 44-year-old man who  presented with complaints of swelling and discomfort in his right knee. The area was drained. More importantly, lab data showed a markedly  elevated WBC count of over 390,000 in the setting of a massive  splenomegaly. The majority of the cells are neutrophils, and at least 6  blasts were noted on the peripheral smear. I am concerned that the  patient is developing a myeloproliferative disorder. Additional tests will  include the BCR-ABL, a bone marrow biopsy will be requested through  the Interventional Radiologist, and flow cytometry will also be  requested from the peripheral blood. I have explained to the patient  that we will need to complete the bone marrow biopsy either today or  tomorrow.   Therefore, I have reviewed the risks and benefits profile of  the procedure, and I have contacted an Interventional Radiologist for  consultative evaluation to have a bone marrow biopsy completed either  later today or early tomorrow. Thanks for requesting my assessment in the management of this  patient.         Ingris Castrejon MD    LS / [address]  D:  03/14/2017   09:03  T:  03/14/2017   09:54  Job #:  [id number]

## 2017-03-14 NOTE — H&P
Preprocedure Assessment      Today 3/14/2017     Indication/Symptoms:   Kandy Grimaldo is a 42 y.o. Male here for BM Bx. Leucocytosis. The H & P and/or progress notes and any available imaging were reviewed. The risks, indications and possible alternatives to the procedure, including doing nothing, were discussed and informed consent was obtained. Physical Exam:      Heart:   RRR   Lungs:   CTAB, no wheezes, rhonchi or rales. The patient is an appropriate candidate to undergo the planned procedure and sedation.     Mj Saldivar MD

## 2017-03-15 PROBLEM — M10.9 ACUTE GOUT OF RIGHT KNEE: Status: ACTIVE | Noted: 2017-03-15

## 2017-03-15 LAB
ALBUMIN SERPL BCP-MCNC: 3 G/DL (ref 3.4–5)
ALBUMIN/GLOB SERPL: 0.8 {RATIO} (ref 0.8–1.7)
ALP SERPL-CCNC: 83 U/L (ref 45–117)
ALT SERPL-CCNC: 14 U/L (ref 16–61)
ANION GAP BLD CALC-SCNC: 6 MMOL/L (ref 3–18)
AST SERPL W P-5'-P-CCNC: 15 U/L (ref 15–37)
BASOPHILS # BLD AUTO: 0 K/UL (ref 0–0.06)
BASOPHILS # BLD: 0 % (ref 0–3)
BILIRUB SERPL-MCNC: 0.2 MG/DL (ref 0.2–1)
BODY FLD TYPE: NORMAL
BUN SERPL-MCNC: 14 MG/DL (ref 7–18)
BUN/CREAT SERPL: 9 (ref 12–20)
CALCIUM SERPL-MCNC: 8.3 MG/DL (ref 8.5–10.1)
CHLORIDE SERPL-SCNC: 104 MMOL/L (ref 100–108)
CO2 SERPL-SCNC: 29 MMOL/L (ref 21–32)
CREAT SERPL-MCNC: 1.55 MG/DL (ref 0.6–1.3)
CRYSTALS FLD MICRO: NORMAL
DIFFERENTIAL METHOD BLD: ABNORMAL
EOSINOPHIL # BLD: 0 K/UL (ref 0–0.4)
EOSINOPHIL NFR BLD: 0 % (ref 0–5)
ERYTHROCYTE [DISTWIDTH] IN BLOOD BY AUTOMATED COUNT: 18.5 % (ref 11.6–14.5)
GLOBULIN SER CALC-MCNC: 3.6 G/DL (ref 2–4)
GLUCOSE SERPL-MCNC: 86 MG/DL (ref 74–99)
HCT VFR BLD AUTO: 25 % (ref 36–48)
HGB BLD-MCNC: 8.6 G/DL (ref 13–16)
LYMPHOCYTES # BLD AUTO: 2 % (ref 20–51)
LYMPHOCYTES # BLD: 7.3 K/UL (ref 0.8–3.5)
MCH RBC QN AUTO: 30.4 PG (ref 24–34)
MCHC RBC AUTO-ENTMCNC: 34.4 G/DL (ref 31–37)
MCV RBC AUTO: 88.3 FL (ref 74–97)
METAMYELOCYTES NFR BLD MANUAL: 15 %
MONOCYTES # BLD: 10.9 K/UL (ref 0–1)
MONOCYTES NFR BLD AUTO: 3 % (ref 2–9)
MYELOCYTES NFR BLD MANUAL: 19 %
NEUTS BAND NFR BLD MANUAL: 56 % (ref 0–5)
NEUTS SEG # BLD: 221.4 K/UL (ref 1.8–8)
NEUTS SEG NFR BLD AUTO: 5 % (ref 42–75)
PLATELET # BLD AUTO: 554 K/UL (ref 135–420)
PLATELET COMMENTS,PCOM: ABNORMAL
PMV BLD AUTO: 12.6 FL (ref 9.2–11.8)
POTASSIUM SERPL-SCNC: 3.3 MMOL/L (ref 3.5–5.5)
PROT SERPL-MCNC: 6.6 G/DL (ref 6.4–8.2)
RBC # BLD AUTO: 2.83 M/UL (ref 4.7–5.5)
SODIUM SERPL-SCNC: 139 MMOL/L (ref 136–145)
WBC # BLD AUTO: 362.9 K/UL (ref 4.6–13.2)

## 2017-03-15 PROCEDURE — 85025 COMPLETE CBC W/AUTO DIFF WBC: CPT | Performed by: INTERNAL MEDICINE

## 2017-03-15 PROCEDURE — 87070 CULTURE OTHR SPECIMN AEROBIC: CPT | Performed by: SPECIALIST

## 2017-03-15 PROCEDURE — 0S9C3ZZ DRAINAGE OF RIGHT KNEE JOINT, PERCUTANEOUS APPROACH: ICD-10-PCS | Performed by: SPECIALIST

## 2017-03-15 PROCEDURE — 36415 COLL VENOUS BLD VENIPUNCTURE: CPT | Performed by: INTERNAL MEDICINE

## 2017-03-15 PROCEDURE — 65270000029 HC RM PRIVATE

## 2017-03-15 PROCEDURE — 74011250637 HC RX REV CODE- 250/637: Performed by: FAMILY MEDICINE

## 2017-03-15 PROCEDURE — 74011250637 HC RX REV CODE- 250/637: Performed by: SPECIALIST

## 2017-03-15 PROCEDURE — 80053 COMPREHEN METABOLIC PANEL: CPT | Performed by: INTERNAL MEDICINE

## 2017-03-15 PROCEDURE — 89060 EXAM SYNOVIAL FLUID CRYSTALS: CPT | Performed by: SPECIALIST

## 2017-03-15 RX ORDER — INDOMETHACIN 25 MG/1
50 CAPSULE ORAL
Status: DISCONTINUED | OUTPATIENT
Start: 2017-03-15 | End: 2017-03-16 | Stop reason: HOSPADM

## 2017-03-15 RX ORDER — POTASSIUM CHLORIDE 20 MEQ/1
20 TABLET, EXTENDED RELEASE ORAL 2 TIMES DAILY
Status: DISCONTINUED | OUTPATIENT
Start: 2017-03-15 | End: 2017-03-16 | Stop reason: HOSPADM

## 2017-03-15 RX ORDER — BETAMETHASONE SODIUM PHOSPHATE AND BETAMETHASONE ACETATE 3; 3 MG/ML; MG/ML
12 INJECTION, SUSPENSION INTRA-ARTICULAR; INTRALESIONAL; INTRAMUSCULAR; SOFT TISSUE ONCE
Status: DISPENSED | OUTPATIENT
Start: 2017-03-15 | End: 2017-03-15

## 2017-03-15 RX ORDER — BUPIVACAINE HYDROCHLORIDE 5 MG/ML
10 INJECTION, SOLUTION EPIDURAL; INTRACAUDAL ONCE
Status: DISPENSED | OUTPATIENT
Start: 2017-03-15 | End: 2017-03-15

## 2017-03-15 RX ORDER — ALLOPURINOL 100 MG/1
100 TABLET ORAL DAILY
Status: DISCONTINUED | OUTPATIENT
Start: 2017-03-15 | End: 2017-03-16 | Stop reason: HOSPADM

## 2017-03-15 RX ADMIN — POTASSIUM CHLORIDE 20 MEQ: 20 TABLET, EXTENDED RELEASE ORAL at 18:00

## 2017-03-15 RX ADMIN — POTASSIUM CHLORIDE 20 MEQ: 20 TABLET, EXTENDED RELEASE ORAL at 12:00

## 2017-03-15 RX ADMIN — TRAMADOL HYDROCHLORIDE 50 MG: 50 TABLET, FILM COATED ORAL at 06:54

## 2017-03-15 RX ADMIN — INDOMETHACIN 50 MG: 25 CAPSULE ORAL at 20:35

## 2017-03-15 RX ADMIN — ALLOPURINOL 100 MG: 100 TABLET ORAL at 12:00

## 2017-03-15 NOTE — PROGRESS NOTES
Hematology/Medical Oncology Progress Note      NAME: Diana Cabrera   :  1975  MRM:  [id number]    Date/Time: 3/15/2017  8:09 AM         Subjective:     Mr. Darling Greer is a 54-year-old -American man who presented with pain and swelling in the right knee. He was subsequently found to have an elevated WBC count in excess of 300,000. A preliminary peripheral smear reading by the pathologist was concerning for an underlying myeloproliferative disorder. The patient has now undergone a bone marrow biopsy results are pending. There is a concern that he may be developing acute myeloid leukemia. Past Medical History reviewed and unchanged from Admission History and Physical    Review of Systems   Constitutional: Negative for activity change, appetite change, chills, diaphoresis, fatigue, fever and unexpected weight change. HENT: Negative for congestion, facial swelling, mouth sores, nosebleeds, postnasal drip, trouble swallowing and voice change. Eyes: Negative for photophobia, pain, discharge and itching. Respiratory: Negative for apnea, cough, choking, chest tightness, wheezing and stridor. Cardiovascular: Negative for chest pain, palpitations and leg swelling. Gastrointestinal: Positive for abdominal distention. Negative for abdominal pain, blood in stool, constipation, diarrhea, nausea and rectal pain. Genitourinary: Negative for difficulty urinating, dysuria, flank pain, hematuria and urgency. Musculoskeletal: Positive for arthralgias and joint swelling. Negative for back pain, gait problem, neck pain and neck stiffness. Skin: Negative for color change, pallor, rash and wound. Neurological: Negative for dizziness, facial asymmetry, speech difficulty, weakness, light-headedness and headaches. Hematological: Negative for adenopathy. Does not bruise/bleed easily. Psychiatric/Behavioral: Negative for agitation, confusion, hallucinations and sleep disturbance.             Objective: Vitals:      Last 24hrs VS reviewed since prior progress note. Most recent are:    Visit Vitals    /71    Pulse 90    Temp 98.3 °F (36.8 °C)    Resp 18    Ht 6' 8\" (2.032 m)    Wt 104.3 kg (230 lb)    SpO2 93%    BMI 25.27 kg/m2     SpO2 Readings from Last 6 Encounters:   03/15/17 93%   09/17/16 96%    O2 Flow Rate (L/min): 2 l/min     Intake/Output Summary (Last 24 hours) at 03/15/17 0809  Last data filed at 03/14/17 1345   Gross per 24 hour   Intake              240 ml   Output                0 ml   Net              240 ml          Exam:      Physical Exam   Nursing note and vitals reviewed. Constitutional: He is oriented to person, place, and time. He appears well-developed and well-nourished. No distress. HENT:   Head: Normocephalic and atraumatic. Mouth/Throat: No oropharyngeal exudate. Eyes: Conjunctivae and EOM are normal. Pupils are equal, round, and reactive to light. Right eye exhibits no discharge. Left eye exhibits no discharge. No scleral icterus. Neck: Normal range of motion. Neck supple. No tracheal deviation present. No thyromegaly present. Cardiovascular: Normal rate and regular rhythm. Exam reveals no gallop and no friction rub. No murmur heard. Pulmonary/Chest: Effort normal and breath sounds normal. No apnea. No respiratory distress. He has no wheezes. He has no rales. Chest wall is not dull to percussion. He exhibits no mass, no tenderness, no bony tenderness, no laceration, no crepitus, no edema, no deformity, no swelling and no retraction. Right breast exhibits no inverted nipple, no mass, no nipple discharge, no skin change and no tenderness. Left breast exhibits no inverted nipple, no mass, no nipple discharge, no skin change and no tenderness. Breasts are symmetrical.   Abdominal: Soft. Bowel sounds are normal. He exhibits no distension. There is no tenderness. There is no rebound and no guarding. Musculoskeletal: Normal range of motion.  He exhibits no edema or tenderness. Lymphadenopathy:     He has no cervical adenopathy. Neurological: He is alert and oriented to person, place, and time. Coordination normal.   Skin: Skin is warm and dry. No rash noted. He is not diaphoretic. No erythema. No pallor. Psychiatric: He has a normal mood and affect. His behavior is normal. Thought content normal.       Telemetry reviewed:   normal sinus rhythm    Lab Data Reviewed: (see below)      Medications:  Current Facility-Administered Medications   Medication Dose Route Frequency    traMADol (ULTRAM) tablet 50 mg  50 mg Oral Q6H PRN    influenza vaccine 2016-17 (36mos+)(PF) (FLUZONE/FLUARIX/FLULAVAL QUAD) injection 0.5 mL  0.5 mL IntraMUSCular PRIOR TO DISCHARGE    fentaNYL citrate (PF) injection 12.5-50 mcg  12.5-50 mcg IntraVENous Multiple    midazolam (VERSED) injection 1 mg  1 mg IntraVENous Multiple    melatonin tablet 3 mg  3 mg Oral QHS PRN    0.9% sodium chloride infusion  75 mL/hr IntraVENous CONTINUOUS    pneumococcal 23-valent (PNEUMOVAX 23) injection 0.5 mL  0.5 mL IntraMUSCular PRIOR TO DISCHARGE       ______________________________________________________________________      Lab Review:     Recent Labs      03/14/17   0314  03/13/17   1303   WBC  341.3*  392.0*   HGB  7.9*  9.3*   HCT  23.2*  26.9*   PLT  493*  525*     Recent Labs      03/13/17   1303   NA  140   K  3.6   CL  105   CO2  30   GLU  83   BUN  16   CREA  1.77*   CA  8.5   MG  2.1   ALB  3.3*   SGOT  19   ALT  20   INR  1.4*     No components found for: GLPOC  No results for input(s): PH, PCO2, PO2, HCO3, FIO2 in the last 72 hours.   Recent Labs      03/13/17   1303   INR  1.4*       Other pertinent lab:          Assessment:     Active Problems:    Leukocytosis (3/13/2017)      Knee effusion, right (3/13/2017)      Myeloproliferative disorder ([address] (3/13/2017)      Anemia (3/13/2017)      Splenomegaly (3/13/2017)           Plan:     Risk of deterioration: low             1. Leukocytosis: I have explained to the patient that preliminarily with splenomegaly and elevated WBC count with a predominance of neutrophils I am concerned that this is most likely chronic myeloid leukemia. The bone marrow biopsy still pending. Once this test results become available then we will be able to discuss options of management. 2. Anemia: The patient will have a CBC drawn this a.m. If the hemoglobin declines to around 8 g/dL we will plan to proceed with a transfusion of packed red blood cells. The CBC will be checked daily. The iron studies and ferritin levels will be ordered. 3. Splenomegaly: I have explained to the patient that his splenomegaly as part of his myeloproliferative disorder. The clinical findings are concerning for chronic myeloid leukemia at this point. The BCRABL by PCR is pending along with a bone marrow biopsy results. The pathologist did inform me that his bone marrow biopsy was a dry tap and therefore flow cytometry has been submitted from the peripheral blood as part of his global assessment for myeloproliferative disorder.            Total time spent with patient: 30 minutes                  Care Plan discussed with: Patient and Consultant/Specialist    Discussed:  Care Plan    Prophylaxis:  H2B/PPI    Disposition:  Home w/Family           ___________________________________________________    Attending Physician: Jaky Rae MD

## 2017-03-15 NOTE — PROGRESS NOTES
Care Management Interventions  PCP Verified by CM: Yes (DR. SHAH MCKEE [facility])  Palliative Care Consult (Criteria: CHF and RRAT>21): No  Reason for No Palliative Care Consult: Other (see comment) (NO ORDER)  Mode of Transport at Discharge: Other (see comment) (FAMILY WILL TRANSPORT)  Transition of Care Consult (CM Consult): Discharge Planning  Current Support Network: Own Home, Family Lives Nearby  Confirm Follow Up Transport: Friends  Plan discussed with Pt/Family/Caregiver: Yes (PT LIVES ALONE AND PLANS TO RETURN HOME IF HAS TOO. PT PLANS TO LIVE WITH PARENTS IF A RECOVERY PERIOD IS NEEDED. )  Discharge Location  Discharge Placement: Home with family assistance   Pt is a 42year old male in room 46, with family at bedside. This pt was admitted due to. Pt is alert and oriented x 3. Pt states that he had a cane prior to this admission to assist with ambulating. Pt report that his plan is to return home when ready for discharge. Pt as well as this pt are awaiting care team recommendations for this pt. SW will assist as needed.

## 2017-03-15 NOTE — PROGRESS NOTES
conducted an initial consultation and Spiritual Assessment for Cholo Tapia, who is a 42 y. o.,male. Patients Primary Language is: [place]. According to the patients EMR Gnosticism Affiliation is: No Scientologist. The reason the Patient came to the hospital is:   Patient Active Problem List    Diagnosis Date Noted    Leukocytosis 03/13/2017    Knee effusion, right 03/13/2017    Myeloproliferative disorder ([address] 03/13/2017    Anemia 03/13/2017    Splenomegaly 03/13/2017        The  provided the following Interventions:  Initiated a relationship of care and support. Explored issues of debi, belief, spirituality and Mandaen/ritual needs while hospitalized. Listened empathically. Provided information about Spiritual Care Services. Offered prayer and assurance of continued prayers on patient's behalf. Chart reviewed. The following outcomes where achieved:  Patient shared limited information about both their medical narrative and spiritual journey/beliefs. Patient processed feeling about current hospitalization. Patient expressed gratitude for 's visit. Assessment:  Patient does not have any Mandaen/cultural needs that will affect patients preferences in health care. There are no spiritual or Mandaen issues which require intervention at this time. Plan:  Chaplains will continue to follow and will provide pastoral care on an as needed/requested basis.  recommends bedside caregivers page  on duty if patient shows signs of acute spiritual or emotional distress.       [address] [facility]   [phone]

## 2017-03-15 NOTE — CONSULTS
Consult    Patient: Aliza Mccallum MRN: [id number]  SSN: [id number]    YOB: 1975  Age: 42 y.o. Sex: male      Subjective:      Aliza Mccallum is a 42 y.o. male who is being seen for right knee swelling and pain. There is no h/o trauma--swelling started spontaneously. He now notes tenderness even to light touch. His knee was aspirated in ED 2 days ago. Analysis revealed 30K WBCs, neg Gr stain and C&S NGSF. Despite aspiration his swelling did not ameliorate appreciably and he is still experiencing pain. He has a h/o prior other knee swelling episodes and a + h/o gout but not by aspirate.   FH--I see his father for knee arthritis    Past Medical History:   Diagnosis Date    STD (sexually transmitted disease)      Past Surgical History:   Procedure Laterality Date    HX KNEE ARTHROSCOPY        Family History   Problem Relation Age of Onset    Hypertension Maternal Grandmother      Social History   Substance Use Topics    Smoking status: Current Every Day Smoker     Packs/day: 0.25     Years: 8.00     Types: Cigarettes    Smokeless tobacco: Never Used    Alcohol use No      Current Facility-Administered Medications   Medication Dose Route Frequency Provider Last Rate Last Dose    bupivacaine (PF) (MARCAINE) 0.5 % (5 mg/mL) injection 50 mg  10 mL Intra artICUlar ONCE Reji Calderon MD        betamethasone (CELESTONE) injection 12 mg  12 mg Intra artICUlar ONCE Reji Calderon MD        allopurinol (ZYLOPRIM) tablet 100 mg  100 mg Oral DAILY Ethel Rivera MD        potassium chloride (K-DUR, KLOR-CON) SR tablet 20 mEq  20 mEq Oral BID Ethel Rivera MD        indomethacin (INDOCIN) capsule 50 mg  50 mg Oral TID WITH MEALS Reji Calderon MD        traMADol Margart Aspen) tablet 50 mg  50 mg Oral Q6H PRN Ethel Rivera MD   50 mg at 03/15/17 0654    influenza vaccine 2016-17 (36mos+)(PF) (FLUZONE/FLUARIX/FLULAVAL QUAD) injection 0.5 mL  0.5 mL IntraMUSCular PRIOR TO DISCHARGE Odell Herron MD        fentaNYL citrate (PF) injection 12.5-50 mcg  12.5-50 mcg IntraVENous Multiple Hilda Perrin MD   50 mcg at 03/14/17 1020    midazolam (VERSED) injection 1 mg  1 mg IntraVENous Multiple Enrico Cottrell MD   1 mg at 03/14/17 1020    melatonin tablet 3 mg  3 mg Oral QHS PRN Odell Herron MD   3 mg at 03/14/17 2114    0.9% sodium chloride infusion  50 mL/hr IntraVENous CONTINUOUS Odell Herron MD 50 mL/hr at 03/15/17 1453 50 mL/hr at 03/15/17 1453    pneumococcal 23-valent (PNEUMOVAX 23) injection 0.5 mL  0.5 mL IntraMUSCular PRIOR TO DISCHARGE Odell Herron MD            No Known Allergies    Review of Systems:  A comprehensive review of systems was negative except for that written in the History of Present Illness. Objective:     Vitals:    03/14/17 2020 03/15/17 0003 03/15/17 0523 03/15/17 1220   BP: 125/77 111/64 135/71 117/74   Pulse: 69 77 90 72   Resp: 18 18 18 18   Temp: 98.7 °F (37.1 °C) 98.2 °F (36.8 °C) 98.3 °F (36.8 °C) 97.1 °F (36.2 °C)   SpO2: 93% 93% 93% 95%   Weight:       Height:            Physical Exam:  EXTREMITIES: right knee    4+ effusion    ROM 40-15 with pain    Distal CMS intact    Diffuse enlargement    + M/L osteophytes     Right knee 3/13/17     INDICATION: Pain and swelling. Symptoms onset this morning.     COMPARISON: None.     FINDINGS:      3 views of the right knee. There is mild tricompartmental joint space loss with  minimal marginal osteophyte formation. There is no fracture, dislocation, or  malalignment. No lytic or blastic lesion. No radiopaque foreign body or soft  tissue gas. There is a large suprapatellar joint effusion.     IMPRESSION  IMPRESSION:      1. No acute osseous abnormality. 2. Mild degenerative changes at all 3 compartments  3.  Large suprapatellar joint effusion.       Imaging          Assessment:     Hospital Problems  Date Reviewed: 3/13/2017          Codes Class Noted POA    Acute gout of right knee ICD-10-CM: M10.9  ICD-9-CM: 274.01  3/15/2017 Unknown        Leukocytosis ICD-10-CM: D72.829  ICD-9-CM: 288.60  3/13/2017 Unknown        Knee effusion, right ICD-10-CM: M25.461  ICD-9-CM: 719.06  3/13/2017 Unknown        Myeloproliferative disorder ([facility] [address] ICD-10-CM: D47.1  ICD-9-CM: 238.79  3/13/2017 Unknown        Anemia ICD-10-CM: D64.9  ICD-9-CM: 285.9  3/13/2017 Unknown        Splenomegaly ICD-10-CM: R16.1  ICD-9-CM: 789.2  3/13/2017 Unknown              Plan:     Right knee aspirated 230 cc slightly turbid joint fluid  Fluid sent for crystals and culture  Will follow as outpatient  Thanks!     Signed By: Papo Guillen MD     March 15, 2017

## 2017-03-15 NOTE — PROGRESS NOTES
Progress Note    Patient: Morales Johns MRN: [id number]  SSN: [id number]    YOB: 1975  Age: 42 y.o. 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 Only complaint is knee pain. Objective:     Vitals:    03/14/17 2020 03/15/17 0003 03/15/17 0523 03/15/17 1220   BP: 125/77 111/64 135/71 117/74   Pulse: 69 77 90 72   Resp: 18 18 18 18   Temp: 98.7 °F (37.1 °C) 98.2 °F (36.8 °C) 98.3 °F (36.8 °C) 97.1 °F (36.2 °C)   SpO2: 93% 93% 93% 95%   Weight:       Height:            Intake and Output:  Current Shift:    Last three shifts: 03/13 1901 - 03/15 0700  In: 240 [P.O.:240]  Out: -     Physical Exam:   GENERAL: alert, cooperative, no distress, appears stated age  LUNG: clear to auscultation bilaterally  HEART: regular rate and rhythm, S1, S2 normal, no murmur, click, rub or gallop  ABDOMEN: abnormal findings:  splenomegaly    Lab/Data Review: All lab results for the last 24 hours reviewed. Blood and joint fluid cultures pending  Bone marrow pathology pending  Creatinine 1.55 hct 25  Wbc 360 potassium 3.3 uric acid 9.5    Assessment:     Active Problems:    Leukocytosis (3/13/2017)      Knee effusion, right (3/13/2017)      Myeloproliferative disorder ([address] (3/13/2017)      Anemia (3/13/2017)      Splenomegaly (3/13/2017)        Plan:     Start allopurinol potassium replacement. Signed By: Nilsa Ward MD     March 15, 2017           Progress Note    Patient: Morales Johns MRN: [id number]  SSN: [id number]    YOB: 1975  Age: 42 y.o.   Sex: male      Admit Date: 3/13/2017    LOS: 2 days     Subjective:         Objective:     Vitals:    03/14/17 2020 03/15/17 0003 03/15/17 0523 03/15/17 1220   BP: 125/77 111/64 135/71 117/74   Pulse: 69 77 90 72   Resp: 18 18 18 18   Temp: 98.7 °F (37.1 °C) 98.2 °F (36.8 °C) 98.3 °F (36.8 °C) 97.1 °F (36.2 °C)   SpO2: 93% 93% 93% 95% Weight:       Height:            Intake and Output:  Current Shift:    Last three shifts: 03/13 1901 - 03/15 0700  In: 240 [P.O.:240]  Out: -     Physical Exam:   General:  Alert, cooperative, no distress, appears stated age. Eyes:  Conjunctivae/corneas clear. PERRL, EOMs intact. Fundi benign   Ears:  Normal TMs and external ear canals both ears. Nose: Nares normal. Septum midline. Mucosa normal. No drainage or sinus tenderness. Mouth/Throat: Lips, mucosa, and tongue normal. Teeth and gums normal.   Neck: Supple, symmetrical, trachea midline, no adenopathy, thyroid: no enlargment/tenderness/nodules, no carotid bruit and no JVD. Back:   Symmetric, no curvature. ROM normal. No CVA tenderness. Lungs:   Clear to auscultation bilaterally. Heart:  Regular rate and rhythm, S1, S2 normal, no murmur, click, rub or gallop. Abdomen:   Soft, non-tender. Bowel sounds normal. No masses,  No organomegaly. Extremities: Extremities normal, atraumatic, no cyanosis or edema. Pulses: 2+ and symmetric all extremities. Skin: Skin color, texture, turgor normal. No rashes or lesions   Lymph nodes: Cervical, supraclavicular, and axillary nodes normal.   Neurologic: CNII-XII intact. Normal strength, sensation and reflexes throughout. Lab/Data Review: All lab results for the last 24 hours reviewed.          Assessment:     Active Problems:    Leukocytosis (3/13/2017)      Knee effusion, right (3/13/2017)      Myeloproliferative disorder ([address] (3/13/2017)      Anemia (3/13/2017)      Splenomegaly (3/13/2017)        Plan:         Signed By: Jim Ulloa MD     March 15, 2017

## 2017-03-16 VITALS
TEMPERATURE: 97.8 F | DIASTOLIC BLOOD PRESSURE: 71 MMHG | HEIGHT: 78 IN | BODY MASS INDEX: 26.61 KG/M2 | SYSTOLIC BLOOD PRESSURE: 105 MMHG | OXYGEN SATURATION: 96 % | HEART RATE: 59 BPM | RESPIRATION RATE: 20 BRPM | WEIGHT: 230 LBS

## 2017-03-16 LAB
BASOPHILS # BLD AUTO: 0 K/UL (ref 0–0.06)
BASOPHILS # BLD: 0 % (ref 0–3)
BLASTS NFR BLD: 5 %
DIFFERENTIAL METHOD BLD: ABNORMAL
EOSINOPHIL # BLD: 0 K/UL (ref 0–0.4)
EOSINOPHIL NFR BLD: 0 % (ref 0–5)
ERYTHROCYTE [DISTWIDTH] IN BLOOD BY AUTOMATED COUNT: 18.5 % (ref 11.6–14.5)
HCT VFR BLD AUTO: 23.7 % (ref 36–48)
HGB BLD-MCNC: 8.1 G/DL (ref 13–16)
LYMPHOCYTES # BLD AUTO: 2 % (ref 20–51)
LYMPHOCYTES # BLD: 7.3 K/UL (ref 0.8–3.5)
MCH RBC QN AUTO: 30.3 PG (ref 24–34)
MCHC RBC AUTO-ENTMCNC: 34.2 G/DL (ref 31–37)
MCV RBC AUTO: 88.8 FL (ref 74–97)
METAMYELOCYTES NFR BLD MANUAL: 13 %
MONOCYTES # BLD: 0 K/UL (ref 0–1)
MONOCYTES NFR BLD AUTO: 0 % (ref 2–9)
MYELOCYTES NFR BLD MANUAL: 18 %
NEUTS BAND NFR BLD MANUAL: 41 % (ref 0–5)
NEUTS SEG # BLD: 204.5 K/UL (ref 1.8–8)
NEUTS SEG NFR BLD AUTO: 15 % (ref 42–75)
PLATELET # BLD AUTO: 451 K/UL (ref 135–420)
PLATELET COMMENTS,PCOM: ABNORMAL
PMV BLD AUTO: 12.7 FL (ref 9.2–11.8)
PROMYELOCYTES NFR BLD MANUAL: 6 %
RBC # BLD AUTO: 2.67 M/UL (ref 4.7–5.5)
RBC MORPH BLD: ABNORMAL
WBC # BLD AUTO: 365.2 K/UL (ref 4.6–13.2)

## 2017-03-16 PROCEDURE — 74011250637 HC RX REV CODE- 250/637: Performed by: FAMILY MEDICINE

## 2017-03-16 PROCEDURE — 74011250636 HC RX REV CODE- 250/636: Performed by: FAMILY MEDICINE

## 2017-03-16 PROCEDURE — 74011250637 HC RX REV CODE- 250/637: Performed by: SPECIALIST

## 2017-03-16 PROCEDURE — 85025 COMPLETE CBC W/AUTO DIFF WBC: CPT | Performed by: INTERNAL MEDICINE

## 2017-03-16 PROCEDURE — 36415 COLL VENOUS BLD VENIPUNCTURE: CPT | Performed by: INTERNAL MEDICINE

## 2017-03-16 RX ORDER — ALLOPURINOL 100 MG/1
300 TABLET ORAL DAILY
Qty: 30 TAB | Refills: 3 | Status: SHIPPED
Start: 2017-03-16

## 2017-03-16 RX ORDER — INDOMETHACIN 50 MG/1
50 CAPSULE ORAL
Qty: 30 CAP | Refills: 0 | Status: SHIPPED
Start: 2017-03-16 | End: 2017-06-14

## 2017-03-16 RX ORDER — LANOLIN ALCOHOL/MO/W.PET/CERES
1 CREAM (GRAM) TOPICAL
Status: DISCONTINUED | OUTPATIENT
Start: 2017-03-17 | End: 2017-03-16 | Stop reason: HOSPADM

## 2017-03-16 RX ORDER — LANOLIN ALCOHOL/MO/W.PET/CERES
325 CREAM (GRAM) TOPICAL
Qty: 30 TAB | Refills: 3 | Status: SHIPPED | OUTPATIENT
Start: 2017-03-17

## 2017-03-16 RX ADMIN — ALLOPURINOL 100 MG: 100 TABLET ORAL at 08:35

## 2017-03-16 RX ADMIN — INDOMETHACIN 50 MG: 25 CAPSULE ORAL at 11:44

## 2017-03-16 RX ADMIN — POTASSIUM CHLORIDE 20 MEQ: 20 TABLET, EXTENDED RELEASE ORAL at 08:35

## 2017-03-16 RX ADMIN — SODIUM CHLORIDE 50 ML/HR: 900 INJECTION, SOLUTION INTRAVENOUS at 08:35

## 2017-03-16 RX ADMIN — INDOMETHACIN 50 MG: 25 CAPSULE ORAL at 08:35

## 2017-03-16 NOTE — PROGRESS NOTES
Progress Note    Patient: Grabiel Corey MRN: [id number]  SSN: [id number]    YOB: 1975  Age: 42 y.o.   Sex: male      Admit Date: 3/13/2017    LOS: 3 days     Subjective:     Feeling better   Improved knee motion  Still has some swelling    Objective:     Vitals:    03/16/17 0410 03/16/17 0756 03/16/17 1119 03/16/17 1522   BP: 103/71 117/71 121/65 105/71   Pulse: 69 72 75 (!) 59   Resp: 20 18 20 20   Temp: 96.9 °F (36.1 °C) 98.5 °F (36.9 °C) 96.7 °F (35.9 °C) 97.8 °F (36.6 °C)   SpO2: 96% 95% 98% 96%   Weight:       Height:            Intake and Output:  Current Shift: 03/16 0701 - 03/16 1900  In: 580 [P.O.:580]  Out: -   Last three shifts: 03/14 1901 - 03/16 0700  In: 237 [P.O.:237]  Out: -     Physical Exam:   tender medial and lateral  -10 with less pain  2+ effusion    Lab/Data Review:  CBC:   Lab Results   Component Value Date/Time    .2 (HH) 03/16/2017 03:55 AM    HGB 8.1 (L) 03/16/2017 03:55 AM    HCT 23.7 (L) 03/16/2017 03:55 AM     (H) 03/16/2017 03:55 AM        Crystals, body fluid    CRYSTALS, SYNOVIAL FLUID           Collected: 03/15/17 1727     Resulting lab: [facility]     Reference range:       Value: URIC ACID CRYSTALS PRESENT     Comment: FEW, MANY WBCs         Assessment:     Active Problems:    Leukocytosis (3/13/2017)      Knee effusion, right (3/13/2017)      Myeloproliferative disorder (H[facility]) (3/13/2017)      Anemia (3/13/2017)      Splenomegaly (3/13/2017)      Acute gout of right knee (3/15/2017)        Plan:     Effusion due to gout  OK for DC  F/U in my office  May need allopurinol long term    Signed By: Tarun Paez MD     March 16, 2017

## 2017-03-16 NOTE — INTERDISCIPLINARY ROUNDS
Interdisciplinary Round Note   Patient Information: Patient Information: Mare Hawkins                                      465/01   Reason for Admission: Leukocytosis  Knee effusion, right   Attending Provider:   Evie Feng MD  Primary Care Physician:       Evie Feng MD       [phone]   Past Medical History:   Past Medical History:   Diagnosis Date    STD (sexually transmitted disease)       Hospital day: 2  Estimated discharge date: 3/18/17  RRAT Score: Low Risk            4       Total Score        4 More than 1 Admission in calendar year        Criteria that do not apply:    Relationship with PCP    Patient Living Status    Patient Length of Stay > 5    Patient Insurance is Medicare, Medicaid or Self Pay    Charlson Comorbidity Score       Goals for Today: biopsy and relief from knee pain    Overnight Events: none     VITAL SIGNS  Vitals:    03/15/17 0003 03/15/17 0523 03/15/17 1220 03/15/17 2121   BP: 111/64 135/71 117/74 123/73   Pulse: 77 90 72 90   Resp: 18 18 18 18   Temp: 98.2 °F (36.8 °C) 98.3 °F (36.8 °C) 97.1 °F (36.2 °C) 98.4 °F (36.9 °C)   SpO2: 93% 93% 95% 99%   Weight:       Height:              Lines, Drains, & Airways    Peripheral IV 03/13/17 Right Antecubital-Site Assessment: Clean, dry, & intact       VTE Prophylaxis                                   Intake and Output:   03/14 0701 - 03/15 1900  In: 240 [P.O.:240]  Out: -   03/15 1901 - 03/16 0700  In: 237 [P.O.:237]  Out: -  Current Diet: DIET REGULAR  DIET NUTRITIONAL SUPPLEMENTS Lunch, Dinner; ENSURE ENLIVE       Abdominal   Abdominal Assessment: Soft  GI Prophylaxis: no        Type:         Recent Glucose Results:   Lab Results   Component Value Date/Time    GLU 86 03/15/2017 08:45 AM          IV Antibiotics? no       When started: Activity Level: Activity Level: Head of bed elevated (degrees)  Needs assistance with ADLs: no  PT Consult Status:  Current Immunizations:   There is no immunization history for the selected administration types on file for this patient.        Recommendations:   Discharge Disposition: Home Independent    Needs for Discharge:  follow up with oncology/hemalogy Recommendations from IDR team: pending test results    Other Notes: awaiting biopsy results

## 2017-03-16 NOTE — DISCHARGE SUMMARY
Discharge Summary     Patient: Juan Stratton MRN: [id number]  SSN: [id number]    YOB: 1975  Age: 42 y.o. Sex: male       Admit Date: 3/13/2017    Discharge Date: 3/16/2017      Admission Diagnoses: Leukocytosis  Knee effusion, right    Discharge Diagnoses:   Problem List as of 3/16/2017  Date Reviewed: 3/13/2017          Codes Class Noted - Resolved    Acute gout of right knee ICD-10-CM: M10.9  ICD-9-CM: 274.01  3/15/2017 - Present        Leukocytosis ICD-10-CM: D72.829  ICD-9-CM: 288.60  3/13/2017 - Present        Knee effusion, right ICD-10-CM: M25.461  ICD-9-CM: 719.06  3/13/2017 - Present        Myeloproliferative disorder ([facility] ICD-10-CM: D47.1  ICD-9-CM: 238.79  3/13/2017 - Present        Anemia ICD-10-CM: D64.9  ICD-9-CM: 285.9  3/13/2017 - Present        Splenomegaly ICD-10-CM: R16.1  ICD-9-CM: 789.2  3/13/2017 - Present               Discharge Condition: Stable    Hospital Course: Patient presented to the ER due to swollen, painful right knee. Patient has history of gout. Patient has had weight loss. Found to be anemic with very elevated WBC. 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 Patient had knee effusion drained and potassium replaced. Patient to be sent home on allopurinol, indocin and iron with follow up tomorrow to begin chemotherapy. Consults: Hematology/Oncology and Orthopedics    Significant Diagnostic Studies: microbiology: blood culture: negative and culture from knee effusion negative, radiology: X-Ray: knee DJD and CT scan: splenomegaly  No adenopathy and bone marrow biopsy  CML    Disposition: home    Discharge Medications:   Current Discharge Medication List      START taking these medications    Details   allopurinol (ZYLOPRIM) 100 mg tablet Take 3 Tabs by mouth daily. Qty: 30 Tab, Refills: 3      indomethacin (INDOCIN) 50 mg capsule Take 1 Cap by mouth three (3) times daily (with meals) for 90 days.   Qty: 30 Cap, Refills: 0      ferrous sulfate 325 mg (65 mg iron) tablet Take 1 Tab by mouth daily (with breakfast). Qty: 30 Tab, Refills: 3             Activity: Activity as tolerated  Diet: Regular Diet  Wound Care: None needed    Follow-up Appointments   Procedures    FOLLOW UP VISIT Appointment in: One Week     Standing Status:   Standing     Number of Occurrences:   1     Order Specific Question:   Appointment in     Answer: One Week       Signed By: Pepper Terrazas MD     March 16, 2017    Patient with anemia of neoplastic disease and iron deciciency anemia.

## 2017-03-16 NOTE — PROGRESS NOTES
Progress Note    Patient: Angelica Brown MRN: [id number]  SSN: [id number]    YOB: 1975  Age: 42 y.o. Sex: male      Admit Date: 3/13/2017    LOS: 3 days     Subjective:     Patient admitted due to knee swek=lling with leukocytosis. Found to have newly diagnosed CML. Knee feeling better,    Objective:     Vitals:    03/15/17 2121 03/16/17 0030 03/16/17 0410 03/16/17 0756   BP: 123/73 116/75 103/71 117/71   Pulse: 90 66 69 72   Resp: 18 20 20 18   Temp: 98.4 °F (36.9 °C) 98 °F (36.7 °C) 96.9 °F (36.1 °C) 98.5 °F (36.9 °C)   SpO2: 99% 93% 96% 95%   Weight:       Height:            Intake and Output:  Current Shift: 03/16 0701 - 03/16 1900  In: 340 [P.O.:340]  Out: -   Last three shifts: 03/14 1901 - 03/16 0700  In: 237 [P.O.:237]  Out: -     Physical Exam:   GENERAL: alert, cooperative, no distress, appears stated age  LUNG: clear to auscultation bilaterally  HEART: regular rate and rhythm, S1, S2 normal, no murmur, click, rub or gallop    Lab/Data Review: All lab results for the last 24 hours reviewed. Blood cultures negative wbc 366 hct 23,5    Assessment:     Active Problems:    Leukocytosis (3/13/2017)      Knee effusion, right (3/13/2017)      Myeloproliferative disorder ([address] (3/13/2017)      Anemia (3/13/2017)      Splenomegaly (3/13/2017)      Acute gout of right knee (3/15/2017)        Plan:     heplock IV  Awaiting plan from hematology/oncology.      Signed By: Osmel Ayala MD     March 16, 2017

## 2017-03-16 NOTE — CDMP QUERY
\"Anemia\" is listed in the hospital problem list.  Please specify all that apply. =>Anemia of chronic disease  =>Anemia of neoplastic disease  =>Iron deficiency anemia  =>Other anemia (spell out)  =>Other Explanation of clinical findings  =>Unable to Determine (no explanation of clinical findings)    The medical record reflects the following clinical findings, treatment, and risk factors:  --receiving Ferrous Sulfate  --receiving serial CBC  --3/13/2017 13:03:  HGB: 9.3 (L), HCT: 26.9 (L)  --3/14/2017 03:14:  HGB: 7.9 (L), HCT: 23.2 (L)  --3/15/2017 08:45:  HGB: 8.6 (L), HCT: 25.0 (L)  --3/16/2017 03:55:  HGB: 8.1 (L), HCT: 23.7 (L)    Please clarify and document your clinical opinion in the progress notes and discharge summary including the definitive and/or presumptive diagnosis, (suspected or probable), related to the above clinical findings. Please include clinical findings supporting your diagnosis. If you DECLINE this query or would like to communicate with [facility], please utilize the \"[employer] message box\" at the TOP of the Progress Note on the right. QUESTIONS?    Fannie Gautam RN  [facility] [phone]

## 2017-03-16 NOTE — PROGRESS NOTES
Hematology/Medical Oncology Progress Note      NAME: Juan Stratton   :  1975  MRM:  [id number]    Date/Time: 3/16/2017  8:14 AM         Subjective: The patient is a 54-year-old [facility] [employer] man who came into the emergency room complaining that he had some swelling and pain in his right knee that had been going on since early morning. He also reported having a history of gout and stated that in the past he underwent an arthroscopy on the knee in . The patient had an arthrocentesis done in the emergency room. Subsequent lab data, including a CBC, revealed that he had a markedly elevated WBC count of 392,000. Our office was consulted due to his blood abnormalities. The patient states he is S/P BMBX on Friday. He staes he has been doing well. His father is at the bedside. He denies any CP, SOB, back pain or weakness. He states his knee continues to improve. He has no complaints at this time. Past Medical History reviewed and unchanged from Admission History and Physical    Review of Systems   Constitutional: Negative for chills, fatigue and fever. HENT: Negative. Eyes: Negative. Respiratory: Negative for cough, chest tightness and shortness of breath. Cardiovascular: Negative for chest pain. Gastrointestinal: Negative for abdominal pain, constipation, diarrhea, nausea and vomiting. Endocrine: Negative. Genitourinary: Negative. Musculoskeletal: Positive for arthralgias and joint swelling. Right knee   Skin: Negative. Allergic/Immunologic: Negative. Neurological: Negative for dizziness, weakness, light-headedness and headaches. Hematological: Negative for adenopathy. Does not bruise/bleed easily. Psychiatric/Behavioral: Negative. Objective:       Vitals:      Last 24hrs VS reviewed since prior progress note.  Most recent are:    Visit Vitals    /71 (BP 1 Location: Right arm, BP Patient Position: At rest)    Pulse 72    Temp 98.5 °F (36.9 °C)  Resp 18    Ht 6' 8\" (2.032 m)    Wt 104.3 kg (230 lb)    SpO2 95%    BMI 25.27 kg/m2     SpO2 Readings from Last 6 Encounters:   03/16/17 95%   09/17/16 96%    O2 Flow Rate (L/min): 2 l/min     Intake/Output Summary (Last 24 hours) at 03/16/17 0814  Last data filed at 03/16/17 0756   Gross per 24 hour   Intake              457 ml   Output                0 ml   Net              457 ml          Exam:      Physical Exam   Nursing note and vitals reviewed. Constitutional: He is oriented to person, place, and time. He appears well-developed and well-nourished. HENT:   Head: Normocephalic and atraumatic. Eyes: Pupils are equal, round, and reactive to light. Neck: Normal range of motion. Neck supple. No thyromegaly present. Cardiovascular: Normal rate and regular rhythm. Pulmonary/Chest: Effort normal and breath sounds normal.   Abdominal: Bowel sounds are normal.   Musculoskeletal: Normal range of motion. Right knee edema   Lymphadenopathy:     He has no cervical adenopathy. Neurological: He is alert and oriented to person, place, and time. Skin: Skin is warm and dry. Psychiatric: He has a normal mood and affect.  His behavior is normal. Judgment and thought content normal.       Telemetry reviewed:   normal sinus rhythm  Tubes:   N/A      Lab Data Reviewed: (see below)      Medications:  Current Facility-Administered Medications   Medication Dose Route Frequency    allopurinol (ZYLOPRIM) tablet 100 mg  100 mg Oral DAILY    potassium chloride (K-DUR, KLOR-CON) SR tablet 20 mEq  20 mEq Oral BID    indomethacin (INDOCIN) capsule 50 mg  50 mg Oral TID WITH MEALS    traMADol (ULTRAM) tablet 50 mg  50 mg Oral Q6H PRN    influenza vaccine 2016-17 (36mos+)(PF) (FLUZONE/FLUARIX/FLULAVAL QUAD) injection 0.5 mL  0.5 mL IntraMUSCular PRIOR TO DISCHARGE    fentaNYL citrate (PF) injection 12.5-50 mcg  12.5-50 mcg IntraVENous Multiple    midazolam (VERSED) injection 1 mg  1 mg IntraVENous Multiple    melatonin tablet 3 mg  3 mg Oral QHS PRN    0.9% sodium chloride infusion  50 mL/hr IntraVENous CONTINUOUS    pneumococcal 23-valent (PNEUMOVAX 23) injection 0.5 mL  0.5 mL IntraMUSCular PRIOR TO DISCHARGE       ______________________________________________________________________      Lab Review:     Recent Labs      03/16/17   0355  03/15/17   0845  03/14/17   0314   WBC  365.2*  362.9*  341.3*   HGB  8.1*  8.6*  7.9*   HCT  23.7*  25.0*  23.2*   PLT  451*  554*  493*     Recent Labs      03/15/17   0845  03/13/17   1303   NA  139  140   K  3.3*  3.6   CL  104  105   CO2  29  30   GLU  86  83   BUN  14  16   CREA  1.55*  1.77*   CA  8.3*  8.5   MG   --   2.1   ALB  3.0*  3.3*   SGOT  15  19   ALT  14*  20   INR   --   1.4*     No components found for: GLPOC  No results for input(s): PH, PCO2, PO2, HCO3, FIO2 in the last 72 hours. Recent Labs      03/13/17   1303   INR  1.4*                Assessment:     Active Problems:    Leukocytosis (3/13/2017)      Knee effusion, right (3/13/2017)      Myeloproliferative disorder ([address] (3/13/2017)      Anemia (3/13/2017)      Splenomegaly (3/13/2017)      Acute gout of right knee (3/15/2017)           Plan:     Risk of deterioration: low             Leukocytosis: WBC today are 365.2. 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 Dr Doug Stein discussed the results with patients father via [employer]. The BCR-ABL is still pending. Once all results have been obtained Dr Doug Stein will discuss treatment options with the patient. Possible /Probable CML:  The BCR-ABL is still pending. Once all results to his testing become available Dr. Doug Stein will discuss treatment with the patient. Anemia: H/H today is 8.1/23.7. Will continue to monitor CBC daily. Will begin ferrous sulfate 325 mg one tab daily. Transfusion will be recommended for HGB of < 7.0. Right knee effusion/Gout:  S/P arthrocentesis.   Due to history of gout, Allopurinol 100 mg daily will continue.            Total time spent with patient: 25 minutes                  Care Plan discussed with: Patient and Family    Discussed:  Care Plan    Prophylaxis:  SCD's    Disposition:  Home w/Family           ___________________________________________________    Attending Physician: Arminda Diaz NP

## 2017-03-16 NOTE — PROGRESS NOTES
Patient seen at bedside, found eating lunch,    Aspirate knee 3/15/17, (+) Uric acid crystals,    Knee feeling better, patient wants crutches,    2+ effusion Right knee, (A) ROM 95 degrees minus 5 degrees,    Distal NVI RLE,    Per Ortho, Ok to DC when medicine approves,     Plan: Please follow up with Ortho, [address] [place], within 1 week DC

## 2017-03-16 NOTE — DISCHARGE INSTRUCTIONS
Gout: Care Instructions  Your Care Instructions  Gout is a form of arthritis caused by a buildup of uric acid crystals in a joint. It causes sudden attacks of pain, swelling, redness, and stiffness, usually in one joint, especially the big toe. Gout usually comes on without a cause. But it can be brought on by drinking alcohol (especially beer) or eating seafood and red meat. Taking certain medicines, such as diuretics or aspirin, also can bring on an attack of gout. Taking your medicines as prescribed and following up with your doctor regularly can help you avoid gout attacks in the future. Follow-up care is a key part of your treatment and safety. Be sure to make and go to all appointments, and call your doctor if you are having problems. Its also a good idea to know your test results and keep a list of the medicines you take. How can you care for yourself at home? · If the joint is swollen, put ice or a cold pack on the area for 10 to 20 minutes at a time. Put a thin cloth between the ice and your skin. · Prop up the sore limb on a pillow when you ice it or anytime you sit or lie down during the next 3 days. Try to keep it above the level of your heart. This will help reduce swelling. · Rest sore joints. Avoid activities that put weight or strain on the joints for a few days. Take short rest breaks from your regular activities during the day. · Take your medicines exactly as prescribed. Call your doctor if you think you are having a problem with your medicine. · Take pain medicines exactly as directed. ¨ If the doctor gave you a prescription medicine for pain, take it as prescribed. ¨ If you are not taking a prescription pain medicine, ask your doctor if you can take an over-the-counter medicine. · Eat less seafood and red meat. · Check with your doctor before drinking alcohol. · Losing weight, if you are overweight, may help reduce attacks of gout. But do not go on a [employer]. \" Losing a lot of weight in a short amount of time can cause a gout attack. When should you call for help? Call your doctor now or seek immediate medical care if:  · You have a fever. · The joint is so painful you cannot use it. · You have sudden, unexplained swelling, redness, warmth, or severe pain in one or more joints. Watch closely for changes in your health, and be sure to contact your doctor if:  · You have joint pain. · Your symptoms get worse or are not improving after 2 or 3 days. Where can you learn more? Go to http://gayle-shai.info/. Enter I387 in the search box to learn more about \"Gout: Care Instructions. \"  Current as of: 2016  Content Version: 11.1  Patient armband removed and shredded  MyChart Activation    Thank you for requesting access to The [email or web address]. Please follow the instructions below to securely access and download your online medical record. The [email or web address] allows you to send messages to your doctor, view your test results, renew your prescriptions, schedule appointments, and more. How Do I Sign Up? 1. In your internet browser, go to [email or web address]  2. Click on the First Time User? Click Here link in the Sign In box. You will be redirect to the New Member Sign Up page. 3. Enter your The [email or web address] Access Code exactly as it appears below. You will not need to use this code after youve completed the sign-up process. If you do not sign up before the expiration date, you must request a new code. The [email or web address] Access Code: [email or web address]  Expires: 2017 11:46 AM (This is the date your The [email or web address] access code will )    4. Enter the last four digits of your Social Security Number (xxxx) and Date of Birth (mm/dd/yyyy) as indicated and click Submit. You will be taken to the next sign-up page. 5. Create a The [email or web address] ID. This will be your The [email or web address] login ID and cannot be changed, so think of one that is secure and easy to remember. 6. Create a The [email or web address] password.  You can change your password at any time. 7. Enter your Password Reset Question and Answer. This can be used at a later time if you forget your password. 8. Enter your e-mail address. You will receive e-mail notification when new information is available in [address] 9. Click Sign Up. You can now view and download portions of your medical record. 10. Click the Download Summary menu link to download a portable copy of your medical information. Additional Information    If you have questions, please visit the Frequently Asked Questions section of the [employer] website at [email or web address] Remember, [employer] is NOT to be used for urgent needs. For medical emergencies, dial 911. DISCHARGE SUMMARY from Nurse    The following personal items are in your possession at time of discharge:    Dental Appliances: None  Visual Aid: None     Home Medications: None     Clothing: Pants, Jacket/Coat, Footwear, Undergarments, With patient  Other Valuables: Cell Phone  Personal Items Sent to Safe:  (none)          PATIENT INSTRUCTIONS:    After general anesthesia or intravenous sedation, for 24 hours or while taking prescription Narcotics:  · Limit your activities  · Do not drive and operate hazardous machinery  · Do not make important personal or business decisions  · Do  not drink alcoholic beverages  · If you have not urinated within 8 hours after discharge, please contact your surgeon on call. Report the following to your surgeon:  · Excessive pain, swelling, redness or odor of or around the surgical area  · Temperature over 100.5  · Nausea and vomiting lasting longer than 4 hours or if unable to take medications  · Any signs of decreased circulation or nerve impairment to extremity: change in color, persistent  numbness, tingling, coldness or increase pain  · Any questions        What to do at Home:  Recommended activity: Activity as tolerated.     If you experience any of the following symptoms fever, chills, nausea, vomiting, chest pain, shortness of breath, please follow up with PCP. *  Please give a list of your current medications to your Primary Care Provider. *  Please update this list whenever your medications are discontinued, doses are      changed, or new medications (including over-the-counter products) are added. *  Please carry medication information at all times in case of emergency situations. These are general instructions for a healthy lifestyle:    No smoking/ No tobacco products/ Avoid exposure to second hand smoke    Surgeon General's Warning:  Quitting smoking now greatly reduces serious risk to your health. Obesity, smoking, and sedentary lifestyle greatly increases your risk for illness    A healthy diet, regular physical exercise & weight monitoring are important for maintaining a healthy lifestyle    You may be retaining fluid if you have a history of heart failure or if you experience any of the following symptoms:  Weight gain of 3 pounds or more overnight or 5 pounds in a week, increased swelling in our hands or feet or shortness of breath while lying flat in bed. Please call your doctor as soon as you notice any of these symptoms; do not wait until your next office visit. Recognize signs and symptoms of STROKE:    F-face looks uneven    A-arms unable to move or move unevenly    S-speech slurred or non-existent    T-time-call 911 as soon as signs and symptoms begin-DO NOT go       Back to bed or wait to see if you get better-TIME IS BRAIN. Warning Signs of HEART ATTACK     Call 911 if you have these symptoms:   Chest discomfort. Most heart attacks involve discomfort in the center of the chest that lasts more than a few minutes, or that goes away and comes back. It can feel like uncomfortable pressure, squeezing, fullness, or pain.  Discomfort in other areas of the upper body.  Symptoms can include pain or discomfort in one or both arms, the back, neck, jaw, or stomach.  Shortness of breath with or without chest discomfort.  Other signs may include breaking out in a cold sweat, nausea, or lightheadedness. Don't wait more than five minutes to call 911 - MINUTES MATTER! Fast action can save your life. Calling 911 is almost always the fastest way to get lifesaving treatment. Emergency Medical Services staff can begin treatment when they arrive -- up to an hour sooner than if someone gets to the hospital by car. The discharge information has been reviewed with the patient. The patient verbalized understanding. Discharge medications reviewed with the patient and appropriate educational materials and side effects teaching were provided. © 5771-8199 Healthwise, Incorporated. Care instructions adapted under license by [employer] (which disclaims liability or warranty for this information). If you have questions about a medical condition or this instruction, always ask your healthcare professional. [address] any warranty or liability for your use of this information.

## 2017-03-17 ENCOUNTER — HOSPITAL ENCOUNTER (OUTPATIENT)
Dept: ONCOLOGY | Age: 42
Discharge: HOME | End: 2017-03-17

## 2017-03-17 ENCOUNTER — OFFICE VISIT (OUTPATIENT)
Dept: ONCOLOGY | Age: 42
End: 2017-03-17

## 2017-03-17 VITALS
TEMPERATURE: 97.8 F | HEIGHT: 78 IN | WEIGHT: 232 LBS | SYSTOLIC BLOOD PRESSURE: 125 MMHG | HEART RATE: 45 BPM | BODY MASS INDEX: 26.84 KG/M2 | DIASTOLIC BLOOD PRESSURE: 69 MMHG

## 2017-03-17 DIAGNOSIS — C92.10 CML (CHRONIC MYELOCYTIC LEUKEMIA) (HCC): ICD-10-CM

## 2017-03-17 DIAGNOSIS — M10.061 ACUTE IDIOPATHIC GOUT OF RIGHT KNEE: ICD-10-CM

## 2017-03-17 DIAGNOSIS — D47.1 MYELOPROLIFERATIVE DISORDER (HCC): ICD-10-CM

## 2017-03-17 DIAGNOSIS — C92.10 CML (CHRONIC MYELOCYTIC LEUKEMIA) (HCC): Primary | ICD-10-CM

## 2017-03-17 LAB
BASOPHILS # BLD AUTO: 0 K/UL (ref 0–0.06)
BASOPHILS # BLD: 0 % (ref 0–3)
BLASTS NFR BLD: 5 %
DIFFERENTIAL METHOD BLD: ABNORMAL
EOSINOPHIL # BLD: 0 K/UL (ref 0–0.4)
EOSINOPHIL NFR BLD: 0 % (ref 0–5)
ERYTHROCYTE [DISTWIDTH] IN BLOOD BY AUTOMATED COUNT: 18.8 % (ref 11.6–14.5)
HCT VFR BLD AUTO: 25.9 % (ref 36–48)
HGB BLD-MCNC: 8.7 G/DL (ref 13–16)
LYMPHOCYTES # BLD AUTO: 2 % (ref 20–51)
LYMPHOCYTES # BLD: 7.7 K/UL (ref 0.8–3.5)
MCH RBC QN AUTO: 30.5 PG (ref 24–34)
MCHC RBC AUTO-ENTMCNC: 33.6 G/DL (ref 31–37)
MCV RBC AUTO: 90.9 FL (ref 74–97)
METAMYELOCYTES NFR BLD MANUAL: 17 %
MONOCYTES # BLD: 3.8 K/UL (ref 0–1)
MONOCYTES NFR BLD AUTO: 1 % (ref 2–9)
MYELOCYTES NFR BLD MANUAL: 12 %
NEUTS BAND NFR BLD MANUAL: 24 % (ref 0–5)
NEUTS SEG # BLD: 207.2 K/UL (ref 1.8–8)
NEUTS SEG NFR BLD AUTO: 30 % (ref 42–75)
NRBC BLD-RTO: 2 PER 100 WBC
PLATELET # BLD AUTO: 655 K/UL (ref 135–420)
PLATELET COMMENTS,PCOM: ABNORMAL
PMV BLD AUTO: 12.7 FL (ref 9.2–11.8)
PROMYELOCYTES NFR BLD MANUAL: 9 %
RBC # BLD AUTO: 2.85 M/UL (ref 4.7–5.5)
RBC MORPH BLD: ABNORMAL
WBC # BLD AUTO: 383.7 K/UL (ref 4.6–13.2)

## 2017-03-17 RX ORDER — IMATINIB MESYLATE 400 MG/1
TABLET, FILM COATED ORAL
Qty: 30 TAB | Refills: 6 | Status: SHIPPED | OUTPATIENT
Start: 2017-03-17 | End: 2017-03-30 | Stop reason: SDUPTHER

## 2017-03-17 NOTE — MR AVS SNAPSHOT
Visit Information Date & Time Provider Department Dept. Phone Encounter #  
 3/17/2017 11:00 AM Aly Handley [facility] Office [phone] Follow-up Instructions Return in about 4 weeks (around 4/14/2017). Your Appointments 3/24/2017 10:15 AM  
Follow Up with Judith Pryor PA-C  
[facility] [address]) Appt Note: 1 WK FU  
 [address] [facility], [address] [facility] [address]  
[phone]  
  
   
 [address] [facility], [address] [place] [address] [address] [address] 4/14/2017  4:15 PM  
Office Visit with Lobo Manley MD  
[place] [address] ([address]) Appt Note: 4 wk fu  
 [facility] [address] [facility] [address]  
[phone]  
  
   
 [facility] [address] Upcoming Health Maintenance Date Due Pneumococcal 19-64 Highest Risk (1 of 3 - PCV13) 10/4/1993 DTaP/Tdap/Td series (1 - Tdap) 10/4/1995 INFLUENZA AGE 9 TO ADULT 8/1/2016 Allergies as of 3/17/2017  Review Complete On: 3/17/2017 By: Lobo Manley MD  
 No Known Allergies Current Immunizations  Never Reviewed No immunizations on file. Not reviewed this visit You Were Diagnosed With   
  
 Codes Comments CML (chronic myelocytic leukemia) ([facility]    -  Primary ICD-10-CM: C92.10 ICD-9-CM: 205.10 Myeloproliferative disorder ([facility]     ICD-10-CM: D47.1 ICD-9-CM: 238.79 Acute idiopathic gout of right knee     ICD-10-CM: M10.061 ICD-9-CM: 274.01 Vitals BP Pulse Temp Height(growth percentile) Weight(growth percentile) BMI  
 125/69 (!) 45 97.8 °F (36.6 °C) (Oral) 6' 8\" (2.032 m) 232 lb (105.2 kg) 25.49 kg/m2 Smoking Status Current Every Day Smoker Vitals History BMI and BSA Data Body Mass Index Body Surface Area  
 25.49 kg/m 2 2.44 m 2 Preferred Pharmacy Pharmacy Name Phone [address], [address] Krishna [address]  [facility] [phone] Your Updated Medication List  
  
   
This list is accurate as of: 3/17/17 12:02 PM.  Always use your most recent med list.  
  
  
  
  
 allopurinol 100 mg tablet Commonly known as:  Garcia Better Take 3 Tabs by mouth daily. ferrous sulfate 325 mg (65 mg iron) tablet Take 1 Tab by mouth daily (with breakfast). indomethacin 50 mg capsule Commonly known as:  INDOCIN Take 1 Cap by mouth three (3) times daily (with meals) for 90 days. We Performed the Following CBC WITH 3 PART DIFF [YEB9174 Custom] COMPLETE CBC & AUTO DIFF WBC [09730 CPT(R)] METABOLIC PANEL, COMPREHENSIVE [49780 CPT(R)] URIC ACID [id number] CPT(R)] Follow-up Instructions Return in about 4 weeks (around 4/14/2017). To-Do List   
 03/17/2017 Lab:  CBC WITH 3 PART DIFF   
  
 03/17/2017 Lab:  METABOLIC PANEL, COMPREHENSIVE   
  
 03/17/2017 Lab:  URIC ACID Introducing [facility]! Sol Farmer introduces MVP Vault patient portal. Now you can access parts of your medical record, email your doctor's office, and request medication refills online. 1. In your internet browser, go to [email or web address] 2. Click on the First Time User? Click Here link in the Sign In box. You will see the New Member Sign Up page. 3. Enter your MVP Vault Access Code exactly as it appears below. You will not need to use this code after youve completed the sign-up process. If you do not sign up before the expiration date, you must request a new code. · MVP Vault Access Code: [email or web address] Expires: 6/11/2017 11:46 AM 
 
4. Enter the last four digits of your Social Security Number (xxxx) and Date of Birth (mm/dd/yyyy) as indicated and click Submit. You will be taken to the next sign-up page. 5. Create a MVP Vault ID.  This will be your MVP Vault login ID and cannot be changed, so think of one that is secure and easy to remember. 6. Create a [employer] password. You can change your password at any time. 7. Enter your Password Reset Question and Answer. This can be used at a later time if you forget your password. 8. Enter your e-mail address. You will receive e-mail notification when new information is available in [address] 9. Click Sign Up. You can now view and download portions of your medical record. 10. Click the Download Summary menu link to download a portable copy of your medical information. If you have questions, please visit the Frequently Asked Questions section of the [employer] website. Remember, [employer] is NOT to be used for urgent needs. For medical emergencies, dial 911. Now available from your iPhone and Android! Please provide this summary of care documentation to your next provider. Your primary care clinician is listed as [address] [place] [address]. If you have any questions after today's visit, please call [phone].

## 2017-03-17 NOTE — PROGRESS NOTES
Hematology/medical oncology progress note    3/17/2017  Mahnaz Thomason  YOB: 1975    PCP: Milly Pantoja MD    Diagnosis: CML    Mr. Eliot Stewart is a 41-year-old -American man who recently presented to the emergency room with a swollen left knee. He does have a history of gout. He had arthrocentesis with removal of fluid from the knee. However his CBC revealed that he had a WBC count of about 392,000. The peripheral smear was concerning for possible myeloproliferative disorder. 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 I have explained to the patient that the primary treatment modality is with Gleevec at a dose of 400 mg p.o. daily. He has a very high tumor burden and he also has extensive hepatomegaly. Allopurinol at a dose of 300 mg daily will be started and we will contact the specialty pharmacy to acquire [address] as soon as possible. The BCR-ABL by PCR is currently pending. This test was ordered while he was in the hospital.  I will plan to see him back in clinic in about 4 weeks after he begins the medication to see how well the medication is being tolerated and to assess for a progressive decline in his WBC count. The patient had his questions answered to his satisfaction. Total time 30 minutes, greater than 50% of the time was in counseling and coordination of care. Jun Shaikh MD, Brian eHrnandez

## 2017-03-18 LAB
A-G RATIO,AGRAT: 1.3 RATIO (ref 1.1–2.6)
ALBUMIN SERPL-MCNC: 3.9 G/DL (ref 3.5–5)
ALP SERPL-CCNC: 80 U/L (ref 25–115)
ALT SERPL-CCNC: 14 U/L (ref 5–40)
ANION GAP SERPL CALC-SCNC: 17 MMOL/L
AST SERPL W P-5'-P-CCNC: 16 U/L (ref 10–37)
BACTERIA SPEC CULT: NORMAL
BILIRUB SERPL-MCNC: 0.2 MG/DL (ref 0.2–1.2)
BUN SERPL-MCNC: 23 MG/DL (ref 6–22)
CALCIUM SERPL-MCNC: 9.1 MG/DL (ref 8.4–10.4)
CHLORIDE SERPL-SCNC: 100 MMOL/L (ref 98–110)
CO2 SERPL-SCNC: 26 MMOL/L (ref 20–32)
CREAT SERPL-MCNC: 1.6 MG/DL (ref 0.5–1.2)
GFRAA, 66117: 56.5
GFRNA, 66118: 46.6
GLOBULIN,GLOB: 3.1 G/DL (ref 2–4)
GLUCOSE SERPL-MCNC: 76 MG/DL (ref 65–99)
GRAM STN SPEC: NORMAL
GRAM STN SPEC: NORMAL
POTASSIUM SERPL-SCNC: 3.6 MMOL/L (ref 3.5–5.5)
PROT SERPL-MCNC: 7 G/DL (ref 6.4–8.3)
SERVICE CMNT-IMP: NORMAL
SODIUM SERPL-SCNC: 143 MMOL/L (ref 133–145)
URATE SERPL-MCNC: 9.2 MG/DL (ref 3.9–9)

## 2017-03-19 LAB
BACTERIA SPEC CULT: NORMAL
BACTERIA SPEC CULT: NORMAL
SERVICE CMNT-IMP: NORMAL
SERVICE CMNT-IMP: NORMAL

## 2017-03-20 LAB
BACTERIA SPEC CULT: NORMAL
GRAM STN SPEC: NORMAL
GRAM STN SPEC: NORMAL
SERVICE CMNT-IMP: NORMAL

## 2017-03-20 RX ADMIN — SODIUM CHLORIDE 1000 MCG: 9 INJECTION INTRAMUSCULAR; INTRAVENOUS; SUBCUTANEOUS at 19:00

## 2017-03-20 NOTE — PROGRESS NOTES
Critical Results for WBC were noted and reviewed. The patient has CML and will be starting treatment.

## 2017-03-21 ENCOUNTER — HOSPITAL ENCOUNTER (EMERGENCY)
Age: 42
Discharge: HOME | End: 2017-03-21
Attending: EMERGENCY MEDICINE
Payer: COMMERCIAL

## 2017-03-21 ENCOUNTER — APPOINTMENT (OUTPATIENT)
Dept: GENERAL RADIOLOGY | Age: 42
End: 2017-03-21
Attending: NURSE PRACTITIONER
Payer: COMMERCIAL

## 2017-03-21 VITALS
OXYGEN SATURATION: 99 % | HEIGHT: 78 IN | RESPIRATION RATE: 16 BRPM | DIASTOLIC BLOOD PRESSURE: 94 MMHG | HEART RATE: 81 BPM | SYSTOLIC BLOOD PRESSURE: 132 MMHG | WEIGHT: 230 LBS | TEMPERATURE: 98 F | BODY MASS INDEX: 26.61 KG/M2

## 2017-03-21 DIAGNOSIS — N48.30 PRIAPISM: Primary | ICD-10-CM

## 2017-03-21 LAB
AMPHET UR QL SCN: NEGATIVE
ANION GAP BLD CALC-SCNC: 10 MMOL/L (ref 3–18)
APPEARANCE UR: CLEAR
APTT PPP: 43.9 SEC (ref 23–36.4)
BACTERIA URNS QL MICRO: ABNORMAL /HPF
BARBITURATES UR QL SCN: NEGATIVE
BASOPHILS # BLD AUTO: 0 K/UL (ref 0–0.06)
BASOPHILS # BLD: 0 % (ref 0–3)
BENZODIAZ UR QL: NEGATIVE
BILIRUB UR QL: NEGATIVE
BLASTS NFR BLD: 4 %
BUN SERPL-MCNC: 17 MG/DL (ref 7–18)
BUN/CREAT SERPL: 10 (ref 12–20)
CALCIUM SERPL-MCNC: 8.9 MG/DL (ref 8.5–10.1)
CANNABINOIDS UR QL SCN: NEGATIVE
CHLORIDE SERPL-SCNC: 106 MMOL/L (ref 100–108)
CO2 SERPL-SCNC: 27 MMOL/L (ref 21–32)
COCAINE UR QL SCN: NEGATIVE
COLOR UR: YELLOW
CREAT SERPL-MCNC: 1.71 MG/DL (ref 0.6–1.3)
DIFFERENTIAL METHOD BLD: ABNORMAL
EOSINOPHIL # BLD: 0 K/UL (ref 0–0.4)
EOSINOPHIL NFR BLD: 0 % (ref 0–5)
EPITH CASTS URNS QL MICRO: NEGATIVE /LPF (ref 0–5)
ERYTHROCYTE [DISTWIDTH] IN BLOOD BY AUTOMATED COUNT: 19.3 % (ref 11.6–14.5)
GLUCOSE SERPL-MCNC: 82 MG/DL (ref 74–99)
GLUCOSE UR STRIP.AUTO-MCNC: NEGATIVE MG/DL
GRAN CASTS URNS QL MICRO: ABNORMAL /LPF
HCT VFR BLD AUTO: 24.4 % (ref 36–48)
HDSCOM,HDSCOM: NORMAL
HGB BLD-MCNC: 8.5 G/DL (ref 13–16)
HGB UR QL STRIP: NEGATIVE
INR PPP: 1.5 (ref 0.8–1.2)
KETONES UR QL STRIP.AUTO: ABNORMAL MG/DL
LEUKOCYTE ESTERASE UR QL STRIP.AUTO: NEGATIVE
LYMPHOCYTES # BLD AUTO: 2 % (ref 20–51)
LYMPHOCYTES # BLD: 8.6 K/UL (ref 0.8–3.5)
MCH RBC QN AUTO: 30.9 PG (ref 24–34)
MCHC RBC AUTO-ENTMCNC: 34.8 G/DL (ref 31–37)
MCV RBC AUTO: 88.7 FL (ref 74–97)
METAMYELOCYTES NFR BLD MANUAL: 16 %
METHADONE UR QL: NEGATIVE
MONOCYTES # BLD: 4.3 K/UL (ref 0–1)
MONOCYTES NFR BLD AUTO: 1 % (ref 2–9)
MYELOCYTES NFR BLD MANUAL: 21 %
NEUTS BAND NFR BLD MANUAL: 22 % (ref 0–5)
NEUTS SEG # BLD: 206.4 K/UL (ref 1.8–8)
NEUTS SEG NFR BLD AUTO: 26 % (ref 42–75)
NITRITE UR QL STRIP.AUTO: NEGATIVE
NRBC BLD-RTO: 2 PER 100 WBC
OPIATES UR QL: NEGATIVE
PCP UR QL: NEGATIVE
PH UR STRIP: 6 [PH] (ref 5–8)
PLATELET # BLD AUTO: 660 K/UL (ref 135–420)
PLATELET COMMENTS,PCOM: ABNORMAL
PMV BLD AUTO: 12.2 FL (ref 9.2–11.8)
POTASSIUM SERPL-SCNC: 3.8 MMOL/L (ref 3.5–5.5)
PROMYELOCYTES NFR BLD MANUAL: 8 %
PROT UR STRIP-MCNC: 100 MG/DL
PROTHROMBIN TIME: 17.2 SEC (ref 11.5–15.2)
RBC # BLD AUTO: 2.75 M/UL (ref 4.7–5.5)
RBC #/AREA URNS HPF: NEGATIVE /HPF (ref 0–5)
RBC MORPH BLD: ABNORMAL
SODIUM SERPL-SCNC: 143 MMOL/L (ref 136–145)
SP GR UR REFRACTOMETRY: 1.02 (ref 1–1.03)
UROBILINOGEN UR QL STRIP.AUTO: 1 EU/DL (ref 0.2–1)
WBC # BLD AUTO: 429.9 K/UL (ref 4.6–13.2)
WBC URNS QL MICRO: ABNORMAL /HPF (ref 0–4)

## 2017-03-21 PROCEDURE — 74011250636 HC RX REV CODE- 250/636: Performed by: UROLOGY

## 2017-03-21 PROCEDURE — 96374 THER/PROPH/DIAG INJ IV PUSH: CPT

## 2017-03-21 PROCEDURE — 74011000250 HC RX REV CODE- 250: Performed by: NURSE PRACTITIONER

## 2017-03-21 PROCEDURE — 72170 X-RAY EXAM OF PELVIS: CPT

## 2017-03-21 PROCEDURE — 80307 DRUG TEST PRSMV CHEM ANLYZR: CPT | Performed by: UROLOGY

## 2017-03-21 PROCEDURE — 74011250637 HC RX REV CODE- 250/637: Performed by: UROLOGY

## 2017-03-21 PROCEDURE — 74011000250 HC RX REV CODE- 250: Performed by: UROLOGY

## 2017-03-21 PROCEDURE — 85730 THROMBOPLASTIN TIME PARTIAL: CPT | Performed by: NURSE PRACTITIONER

## 2017-03-21 PROCEDURE — 75810000279 HC INJ/IRRIG FOR PRIAPISM

## 2017-03-21 PROCEDURE — 80048 BASIC METABOLIC PNL TOTAL CA: CPT | Performed by: NURSE PRACTITIONER

## 2017-03-21 PROCEDURE — 85610 PROTHROMBIN TIME: CPT | Performed by: NURSE PRACTITIONER

## 2017-03-21 PROCEDURE — 81001 URINALYSIS AUTO W/SCOPE: CPT | Performed by: NURSE PRACTITIONER

## 2017-03-21 PROCEDURE — 99284 EMERGENCY DEPT VISIT MOD MDM: CPT

## 2017-03-21 PROCEDURE — 85025 COMPLETE CBC W/AUTO DIFF WBC: CPT | Performed by: NURSE PRACTITIONER

## 2017-03-21 RX ORDER — HYDROCODONE BITARTRATE AND ACETAMINOPHEN 5; 325 MG/1; MG/1
TABLET ORAL
Qty: 12 TAB | Refills: 0 | Status: SHIPPED | OUTPATIENT
Start: 2017-03-21 | End: 2017-03-21

## 2017-03-21 RX ORDER — PSEUDOEPHEDRINE HCL 30 MG
60 TABLET ORAL
Status: COMPLETED | OUTPATIENT
Start: 2017-03-21 | End: 2017-03-21

## 2017-03-21 RX ORDER — LIDOCAINE HYDROCHLORIDE 10 MG/ML
10 INJECTION, SOLUTION EPIDURAL; INFILTRATION; INTRACAUDAL; PERINEURAL ONCE
Status: COMPLETED | OUTPATIENT
Start: 2017-03-21 | End: 2017-03-21

## 2017-03-21 RX ORDER — PHENYLEPHRINE HYDROCHLORIDE 10 MG/ML
500 INJECTION INTRAVENOUS ONCE
Status: DISCONTINUED | OUTPATIENT
Start: 2017-03-21 | End: 2017-03-21 | Stop reason: SDUPTHER

## 2017-03-21 RX ORDER — MORPHINE SULFATE 4 MG/ML
4 INJECTION, SOLUTION INTRAMUSCULAR; INTRAVENOUS
Status: DISCONTINUED | OUTPATIENT
Start: 2017-03-21 | End: 2017-03-22 | Stop reason: HOSPADM

## 2017-03-21 RX ORDER — HYDROCODONE BITARTRATE AND ACETAMINOPHEN 5; 325 MG/1; MG/1
1 TABLET ORAL
Qty: 30 TAB | Refills: 0 | Status: SHIPPED | OUTPATIENT
Start: 2017-03-21

## 2017-03-21 RX ORDER — PHENYLEPHRINE HCL IN 0.9% NACL 30MG/250ML
10-100 PLASTIC BAG, INJECTION (ML) INTRAVENOUS
Status: DISCONTINUED | OUTPATIENT
Start: 2017-03-21 | End: 2017-03-21 | Stop reason: RX

## 2017-03-21 RX ORDER — PSEUDOEPHEDRINE HCL 30 MG
30 TABLET ORAL
Qty: 16 TAB | Refills: 0 | Status: SHIPPED | OUTPATIENT
Start: 2017-03-21

## 2017-03-21 RX ADMIN — LIDOCAINE HYDROCHLORIDE 10 ML: 10 INJECTION, SOLUTION EPIDURAL; INFILTRATION; INTRACAUDAL; PERINEURAL at 20:04

## 2017-03-21 RX ADMIN — Medication 4 MG: at 20:25

## 2017-03-21 RX ADMIN — PSEUDOEPHEDRINE HCL 60 MG: 30 TABLET, COATED ORAL at 21:49

## 2017-03-21 NOTE — ED TRIAGE NOTES
Patient woke up with an erection this morning at 9am and it hasn't gone down. Was recently diagnosed with leukemia. Ice packs did not work. Denies any use of enhancement drugs.

## 2017-03-22 NOTE — DISCHARGE INSTRUCTIONS
Priapism (Prolonged Erection): Care Instructions  Your Care Instructions  Priapism (say \"NEL-ba-wvl-um\") is an erection that does not go away. This happens when the penis fills with fluid without sexual stimulation. And it does not go away after orgasm. It may last on and off for days or weeks. The cause of priapism is often not known. But it can happen to men who have sickle cell disease or diabetes. Taking or using medicine for erection problems may also cause priapism. Your doctor may have removed blood from your penis to ease the pain. And he or she may have given you a shot of medicine to soften the erection. Follow-up care is a key part of your treatment and safety. Be sure to make and go to all appointments, and call your doctor if you are having problems. It's also a good idea to know your test results and keep a list of the medicines you take. How can you care for yourself at home? · Put ice or a cold pack on the area for 10 to 20 minutes at a time. Put a thin cloth between the ice and your skin. · Urinate often. Don't wait until you feel the need. · Avoid the medicine that may have caused the painful erection. When should you call for help? Call your doctor now or seek immediate medical care if:  · You have a new painful erection that does not go away. Watch closely for changes in your health, and be sure to contact your doctor if:  · You do not get better as expected. Where can you learn more? Go to http://gayle-shai.info/. Enter B643 in the search box to learn more about \"Priapism (Prolonged Erection): Care Instructions. \"  Current as of: May 24, 2016  Content Version: 11.1  © 9264-0747 [employer]. Care instructions adapted under license by [employer] (which disclaims liability or warranty for this information).  If you have questions about a medical condition or this instruction, always ask your healthcare professional. [employer] disclaims any warranty or liability for your use of this information. MyChart Activation    Thank you for requesting access to [employer]. Please follow the instructions below to securely access and download your online medical record. [employer] allows you to send messages to your doctor, view your test results, renew your prescriptions, schedule appointments, and more. How Do I Sign Up? 1. In your internet browser, go to [email or web address]  2. Click on the First Time User? Click Here link in the Sign In box. You will be redirect to the New Member Sign Up page. 3. Enter your [employer] Access Code exactly as it appears below. You will not need to use this code after youve completed the sign-up process. If you do not sign up before the expiration date, you must request a new code. [employer] Access Code: [email or web address]  Expires: 2017 11:46 AM (This is the date your [employer] access code will )    4. Enter the last four digits of your Social Security Number (xxxx) and Date of Birth (mm/dd/yyyy) as indicated and click Submit. You will be taken to the next sign-up page. 5. Create a [employer] ID. This will be your [employer] login ID and cannot be changed, so think of one that is secure and easy to remember. 6. Create a [employer] password. You can change your password at any time. 7. Enter your Password Reset Question and Answer. This can be used at a later time if you forget your password. 8. Enter your e-mail address. You will receive e-mail notification when new information is available in [address] 9. Click Sign Up. You can now view and download portions of your medical record. 10. Click the Download Summary menu link to download a portable copy of your medical information. Additional Information    If you have questions, please visit the Frequently Asked Questions section of the [employer] website at [email or web address] Remember, [employer] is NOT to be used for urgent needs. For medical emergencies, dial 911.

## 2017-03-22 NOTE — CONSULTS
Urology Consult    Subjective:     Date of Consultation:  2017    Referring Physician: Neo Marinelli np    Reason for Consultation:  priapsim    History of Present Illness:   Patient is a 42 y.o.  male who is being seen for 10hr priapism. He was admitted to the hospital for same. Recent dx of CML-hasnt started treatment yet. Denies ilicit or ED meds taken. No sickle cell hx. No prior issues with erections. Past Medical History:   Diagnosis Date    STD (sexually transmitted disease)       Past Surgical History:   Procedure Laterality Date    HX KNEE ARTHROSCOPY        Family History   Problem Relation Age of Onset    Hypertension Maternal Grandmother       Social History   Substance Use Topics    Smoking status: Current Every Day Smoker     Packs/day: 0.25     Years: 8.00     Types: Cigarettes    Smokeless tobacco: Never Used    Alcohol use No     No Known Allergies   Prior to Admission medications    Medication Sig Start Date End Date Taking? Authorizing Provider   imatinib (GLEEVEC) 400 mg tablet Take 1 tab po qd 3/17/17   Debra Reid MD   allopurinol (ZYLOPRIM) 100 mg tablet Take 3 Tabs by mouth daily. 3/16/17   Luciana Kaminski MD   indomethacin (INDOCIN) 50 mg capsule Take 1 Cap by mouth three (3) times daily (with meals) for 90 days. 3/16/17 6/14/17  Luciana Kaminski MD   ferrous sulfate 325 mg (65 mg iron) tablet Take 1 Tab by mouth daily (with breakfast).  3/17/17   Luciana Kaminski MD         Review of Systems:  Constitutional: negative for sweats  Respiratory: negative for sputum  Cardiovascular: negative for dyspnea  Gastrointestinal: negative for dyspepsia  Genitourinary:positive for prolonged erection    Objective:     Patient Vitals for the past 8 hrs:   BP Temp Pulse Resp SpO2 Height Weight   17 1815 (!) 132/94 98 °F (36.7 °C) 81 16 99 % 6' 8\" (2.032 m) 230 lb (104.3 kg)     Temp (24hrs), Av °F (36.7 °C), Min:98 °F (36.7 °C), Max:98 °F (36.7 °C)      Intake and Output:        Physical Exam:            General:    alert, cooperative, no distress, appears stated age                     Skin:  Normal.                HEENT:  Deferred        Throat/Neck:  NA   Lymph nodes:  NA                 Lungs:  clear to auscultation bilaterally      Cardiovascular:  Regular rate and rhythm, pedal pulses normal and no edema             Abdomen[de-identified]  soft, non-tender.  Bowel sounds normal. No masses,  no organomegaly             Genitalia:  penis exam: circ, fully erect and painful          Extremities:  negative       Assessment:     priapism          Plan:     Serum labs sent  UDS  Has CT scan for his CML pending   ABG sent from corpora consistent with low flow ischemic priapism  Irrigated with NS and phenylephrine 800mcg total with good detumescence  Needs FU 1 week with TRACIE Horan at [place] office    Signed By: Fiona Kerr MD                         March 21, 2017

## 2017-03-22 NOTE — ED PROVIDER NOTES
HPI Comments: Pt with recent diagnosis of leukemia presents with the chief complaint of priapism for the last 10 hours. Pt states that he tried to relieve it with ice applications, heat, and masturbation to no improvement. Pt denies any history of SCA and this is his first episode of priapism. Pt has not started any new medications. Pt has seen Dr Elly Gonzalez but is awaiting his medications to be delivered via mail. Pt is established with urologist Jairon Dewitt with [facility]. Past Medical History:   Diagnosis Date    STD (sexually transmitted disease)        Past Surgical History:   Procedure Laterality Date    HX KNEE ARTHROSCOPY           Family History:   Problem Relation Age of Onset    Hypertension Maternal Grandmother        Social History     Social History    Marital status: UNKNOWN     Spouse name: N/A    Number of children: N/A    Years of education: N/A     Occupational History    Not on file. Social History Main Topics    Smoking status: Current Every Day Smoker     Packs/day: 0.25     Years: 8.00     Types: Cigarettes    Smokeless tobacco: Never Used    Alcohol use No    Drug use: No    Sexual activity: Not on file     Other Topics Concern    Not on file     Social History Narrative    No narrative on file         ALLERGIES: Review of patient's allergies indicates no known allergies. Review of Systems   Constitutional: Negative. Negative for chills and fever. HENT: Negative. Negative for congestion, rhinorrhea and sinus pressure. Respiratory: Negative. Negative for cough, shortness of breath, wheezing and stridor. Cardiovascular: Negative. Negative for chest pain, palpitations and leg swelling. Gastrointestinal: Negative. Negative for abdominal distention, abdominal pain, diarrhea, nausea and vomiting. Genitourinary: Positive for penile pain.  Negative for decreased urine volume, difficulty urinating, discharge, dysuria, flank pain, frequency, hematuria, penile swelling, scrotal swelling, testicular pain and urgency. Musculoskeletal: Negative. Negative for back pain. Neurological: Negative. Negative for dizziness, weakness, light-headedness and numbness. Hematological: Does not bruise/bleed easily. All other systems reviewed and are negative. Vitals:    03/21/17 1815   BP: (!) 132/94   Pulse: 81   Resp: 16   Temp: 98 °F (36.7 °C)   SpO2: 99%   Weight: 104.3 kg (230 lb)   Height: 6' 8\" (2.032 m)            Physical Exam   Constitutional: He is oriented to person, place, and time. He appears well-developed and well-nourished. No distress. HENT:   Head: Normocephalic and atraumatic. Eyes: Conjunctivae and EOM are normal. Pupils are equal, round, and reactive to light. Neck: Normal range of motion. Neck supple. Cardiovascular: Normal rate, regular rhythm and normal heart sounds. Pulmonary/Chest: Effort normal and breath sounds normal. No respiratory distress. He has no wheezes. He has no rales. He exhibits no tenderness. Abdominal: Soft. Bowel sounds are normal. He exhibits no distension. There is no tenderness. Genitourinary:   Genitourinary Comments: Fully erect penis   Musculoskeletal: Normal range of motion. Neurological: He is alert and oriented to person, place, and time. He exhibits normal muscle tone. Coordination normal.   Skin: Skin is warm and dry. He is not diaphoretic. Psychiatric: He has a normal mood and affect. His behavior is normal. Thought content normal.   Nursing note and vitals reviewed. MDM  Number of Diagnoses or Management Options  Diagnosis management comments: After discussing with Dr. Irish March, Dr. Boy Hudson was consulted for treatment. Erection was relieved with phenlyephrine injection. Labs are consistent with pt's recently diagnosed leukemia for which he is being followed by Dr. Raina Peña.  Pt given follow up instructions by Dr. Oumou Silverman for [facility] given       Amount and/or Complexity of Data Reviewed  Clinical lab tests: ordered and reviewed  Tests in the radiology section of CPT®: ordered and reviewed  Discuss the patient with other providers: (Mustapha Feng and Lety)    Risk of Complications, Morbidity, and/or Mortality  Presenting problems: high  Diagnostic procedures: moderate  Management options: moderate    Patient Progress  Patient progress: improved    ED Course       Procedures                       Diagnosis:   1. Priapism          Disposition: home      Follow-up Information     Follow up With Details Comments Vera Burton MD Call in 1 day  [facility] [address] [place] [address]  [phone]            Patient's Medications   Start Taking    HYDROCODONE-ACETAMINOPHEN (NORCO) 5-325 MG PER TABLET    Take 1 Tab by mouth every four (4) hours as needed for Pain. Max Daily Amount: 6 Tabs. HYDROCODONE-ACETAMINOPHEN (NORCO) 5-325 MG PER TABLET    Take 1-2 tablets PO every 4-6 hours as needed for pain control. If over the counter ibuprofen or acetaminophen was suggested, then only take the vicodin for pain not well controlled with the over the counter medication. PSEUDOEPHEDRINE (SUDAFED) 30 MG TABLET    Take 1 Tab by mouth every four (4) hours as needed for Congestion. Continue Taking    ALLOPURINOL (ZYLOPRIM) 100 MG TABLET    Take 3 Tabs by mouth daily. FERROUS SULFATE 325 MG (65 MG IRON) TABLET    Take 1 Tab by mouth daily (with breakfast). IMATINIB (GLEEVEC) 400 MG TABLET    Take 1 tab po qd    INDOMETHACIN (INDOCIN) 50 MG CAPSULE    Take 1 Cap by mouth three (3) times daily (with meals) for 90 days.    These Medications have changed    No medications on file   Stop Taking    No medications on file

## 2017-03-24 ENCOUNTER — OFFICE VISIT (OUTPATIENT)
Dept: ORTHOPEDIC SURGERY | Facility: CLINIC | Age: 42
End: 2017-03-24

## 2017-03-24 VITALS
WEIGHT: 224 LBS | HEART RATE: 70 BPM | DIASTOLIC BLOOD PRESSURE: 72 MMHG | TEMPERATURE: 97.1 F | BODY MASS INDEX: 24.61 KG/M2 | SYSTOLIC BLOOD PRESSURE: 112 MMHG

## 2017-03-24 DIAGNOSIS — D64.9 ANEMIA, UNSPECIFIED TYPE: ICD-10-CM

## 2017-03-24 DIAGNOSIS — M10.061 ACUTE IDIOPATHIC GOUT OF RIGHT KNEE: Primary | ICD-10-CM

## 2017-03-24 DIAGNOSIS — C92.10 CML (CHRONIC MYELOCYTIC LEUKEMIA) (HCC): ICD-10-CM

## 2017-03-24 DIAGNOSIS — Z87.438 HISTORY OF PRIAPISM: ICD-10-CM

## 2017-03-24 DIAGNOSIS — R16.1 SPLENOMEGALY: ICD-10-CM

## 2017-03-24 DIAGNOSIS — D72.829 LEUKOCYTOSIS, UNSPECIFIED TYPE: ICD-10-CM

## 2017-03-24 DIAGNOSIS — M17.11 PRIMARY OSTEOARTHRITIS OF RIGHT KNEE: ICD-10-CM

## 2017-03-24 DIAGNOSIS — D47.1 MYELOPROLIFERATIVE DISORDER (HCC): ICD-10-CM

## 2017-03-24 DIAGNOSIS — M25.561 RIGHT KNEE PAIN, UNSPECIFIED CHRONICITY: ICD-10-CM

## 2017-03-24 DIAGNOSIS — M25.461 KNEE EFFUSION, RIGHT: ICD-10-CM

## 2017-03-24 NOTE — MR AVS SNAPSHOT
Visit Information Date & Time Provider Department Dept. Phone Encounter #  
 3/24/2017 10:15 AM Tarun Paez MD [facility] [phone] [id number] Follow-up Instructions Return if symptoms worsen or fail to improve. Your Appointments 4/14/2017  4:15 PM  
Office Visit with MD Jey Am[address] ([facility]) Appt Note: 4 wk fu  
 [facility] 99[address] [facility] [address] [place] [id number]  
[phone]  
  
   
 [facility] [address] Upcoming Health Maintenance Date Due Pneumococcal 19-64 Highest Risk (1 of 3 - PCV13) 10/4/1993 DTaP/Tdap/Td series (1 - Tdap) 10/4/1995 INFLUENZA AGE 9 TO ADULT 8/1/2016 Allergies as of 3/24/2017  Review Complete On: 3/24/2017 By: Boris Bardales No Known Allergies Current Immunizations  Never Reviewed No immunizations on file. Not reviewed this visit You Were Diagnosed With   
  
 Codes Comments Acute idiopathic gout of right knee    -  Primary ICD-10-CM: M10.061 ICD-9-CM: 274.01 Knee effusion, right     ICD-10-CM: M25.461 ICD-9-CM: 719.06 Large, suprapatellar Leukocytosis, unspecified type     ICD-10-CM: D72.829 ICD-9-CM: 288.60 CML (chronic myelocytic leukemia) ([facility]     ICD-10-CM: C92.10 ICD-9-CM: 205.10 Myeloproliferative disorder ([facility].)     ICD-10-CM: D47.1 ICD-9-CM: 238.79 Splenomegaly     ICD-10-CM: R16.1 ICD-9-CM: 728. 2 Anemia, unspecified type     ICD-10-CM: D64.9 ICD-9-CM: 170. 9 Right knee pain, unspecified chronicity     ICD-10-CM: M25.561 ICD-9-CM: 719.46 Primary osteoarthritis of right knee     ICD-10-CM: M17.11 ICD-9-CM: 715.16 Mild, tricompartmental  
 History of priapism     ICD-10-CM: Z87.438 ICD-9-CM: V13.29 Vitals BP Pulse Temp Weight(growth percentile) BMI Smoking Status 112/72 (BP 1 Location: Left arm, BP Patient Position: Sitting) 70 97.1 °F (36.2 °C) (Oral) 224 lb (101.6 kg) 24.61 kg/m2 Current Every Day Smoker BMI and BSA Data Body Mass Index Body Surface Area  
 24.61 kg/m 2 2.39 m 2 Preferred Pharmacy Pharmacy Name Phone [facility] [phone] Your Updated Medication List  
  
   
This list is accurate as of: 3/24/17 10:59 AM.  Always use your most recent med list.  
  
  
  
  
 allopurinol 100 mg tablet Commonly known as:  Melissa Hassan Take 3 Tabs by mouth daily. ferrous sulfate 325 mg (65 mg iron) tablet Take 1 Tab by mouth daily (with breakfast). HYDROcodone-acetaminophen 5-325 mg per tablet Commonly known as:  López Punt Take 1 Tab by mouth every four (4) hours as needed for Pain. Max Daily Amount: 6 Tabs. imatinib 400 mg tablet Commonly known as:  GLEEVEC Take 1 tab po qd  
  
 indomethacin 50 mg capsule Commonly known as:  INDOCIN Take 1 Cap by mouth three (3) times daily (with meals) for 90 days. pseudoephedrine 30 mg tablet Commonly known as:  SUDAFED Take 1 Tab by mouth every four (4) hours as needed for Congestion. Follow-up Instructions Return if symptoms worsen or fail to improve. To-Do List   
 03/28/2017 10:30 AM  
  Appointment with [facility] CT RM 2 at [facility] RAD CT ([phone]) ORAL CONTRAST/PREP  No oral contrast is needed for this exam.  DIET RESTRICTIONS  Nothing to eat or drink, 4 hours prior to study  May have water to take meds  GENERAL INSTRUCTIONS  If you were given premedications for IV contrast to take prior to having your study, please arrange to have someone drive you to your appointment. If you have had a creatinine level drawn within the past 30 days, please bring most recent results to your appt.   MEDICATIONS  Bring a complete list of all medications you are currently taking to include prescriptions, over-the-counter meds, herbals, vitamins & any dietary supplements. RELATED STUDY INFORMATION  Bring any films, CDs, and reports related with you on the day of your exam.  This only includes studies done outside of [address] [place] [address], [place] [address], [place], and [place]. QUESTIONS  Notify the CT Department if you have any questions concerning your study. [place] - [phone] [facility] - [phone] Patient Instructions Gout: Care Instructions Your Care Instructions Gout is a form of arthritis caused by a buildup of uric acid crystals in a joint. It causes sudden attacks of pain, swelling, redness, and stiffness, usually in one joint, especially the big toe. Gout usually comes on without a cause. But it can be brought on by drinking alcohol (especially beer) or eating seafood and red meat. Taking certain medicines, such as diuretics or aspirin, also can bring on an attack of gout. Taking your medicines as prescribed and following up with your doctor regularly can help you avoid gout attacks in the future. Follow-up care is a key part of your treatment and safety. Be sure to make and go to all appointments, and call your doctor if you are having problems. Its also a good idea to know your test results and keep a list of the medicines you take. How can you care for yourself at home? · If the joint is swollen, put ice or a cold pack on the area for 10 to 20 minutes at a time. Put a thin cloth between the ice and your skin. · Prop up the sore limb on a pillow when you ice it or anytime you sit or lie down during the next 3 days. Try to keep it above the level of your heart. This will help reduce swelling. · Rest sore joints. Avoid activities that put weight or strain on the joints for a few days. Take short rest breaks from your regular activities during the day. · Take your medicines exactly as prescribed. Call your doctor if you think you are having a problem with your medicine. · Take pain medicines exactly as directed. ¨ If the doctor gave you a prescription medicine for pain, take it as prescribed. ¨ If you are not taking a prescription pain medicine, ask your doctor if you can take an over-the-counter medicine. · Eat less seafood and red meat. · Check with your doctor before drinking alcohol. · Losing weight, if you are overweight, may help reduce attacks of gout. But do not go on a [employer]. \" Losing a lot of weight in a short amount of time can cause a gout attack. When should you call for help? Call your doctor now or seek immediate medical care if: 
· You have a fever. · The joint is so painful you cannot use it. · You have sudden, unexplained swelling, redness, warmth, or severe pain in one or more joints. Watch closely for changes in your health, and be sure to contact your doctor if: 
· You have joint pain. · Your symptoms get worse or are not improving after 2 or 3 days. Where can you learn more? Go to http://gayle-shai.info/. Enter H742 in the search box to learn more about \"Gout: Care Instructions. \" Current as of: February 24, 2016 Content Version: 11.1 © 6149-7575 [employer]. Care instructions adapted under license by [employer] (which disclaims liability or warranty for this information). If you have questions about a medical condition or this instruction, always ask your healthcare professional. [place] [address] any warranty or liability for your use of this information. Priapism (Prolonged Erection): Care Instructions Your Care Instructions Priapism (say \"FOF-gq-fod-um\") is an erection that does not go away. This happens when the penis fills with fluid without sexual stimulation. And it does not go away after orgasm. It may last on and off for days or weeks. The cause of priapism is often not known. But it can happen to men who have sickle cell disease or diabetes. Taking or using medicine for erection problems may also cause priapism. Your doctor may have removed blood from your penis to ease the pain. And he or she may have given you a shot of medicine to soften the erection. Follow-up care is a key part of your treatment and safety. Be sure to make and go to all appointments, and call your doctor if you are having problems. It's also a good idea to know your test results and keep a list of the medicines you take. How can you care for yourself at home? · Put ice or a cold pack on the area for 10 to 20 minutes at a time. Put a thin cloth between the ice and your skin. · Urinate often. Don't wait until you feel the need. · Avoid the medicine that may have caused the painful erection. When should you call for help? Call your doctor now or seek immediate medical care if: 
· You have a new painful erection that does not go away. Watch closely for changes in your health, and be sure to contact your doctor if: 
· You do not get better as expected. Where can you learn more? Go to http://gayle-shai.info/. Enter B643 in the search box to learn more about \"Priapism (Prolonged Erection): Care Instructions. \" Current as of: May 24, 2016 Content Version: 11.1 © 6413-5614 [employer]. Care instructions adapted under license by [employer] (which disclaims liability or warranty for this information). If you have questions about a medical condition or this instruction, always ask your healthcare professional. [address] any warranty or liability for your use of this information. Introducing [facility]! Darshan Garcia introduces [employer] patient portal. Now you can access parts of your medical record, email your doctor's office, and request medication refills online. 1. In your internet browser, go to [email or web address] 2. Click on the First Time User? Click Here link in the Sign In box. You will see the New Member Sign Up page. 3. Enter your burrp! Access Code exactly as it appears below. You will not need to use this code after youve completed the sign-up process. If you do not sign up before the expiration date, you must request a new code. · burrp! Access Code: [email or web address] Expires: 6/11/2017 11:46 AM 
 
4. Enter the last four digits of your Social Security Number (xxxx) and Date of Birth (mm/dd/yyyy) as indicated and click Submit. You will be taken to the next sign-up page. 5. Create a Joinnust ID. This will be your burrp! login ID and cannot be changed, so think of one that is secure and easy to remember. 6. Create a burrp! password. You can change your password at any time. 7. Enter your Password Reset Question and Answer. This can be used at a later time if you forget your password. 8. Enter your e-mail address. You will receive e-mail notification when new information is available in [address] 9. Click Sign Up. You can now view and download portions of your medical record. 10. Click the Download Summary menu link to download a portable copy of your medical information. If you have questions, please visit the Frequently Asked Questions section of the burrp! website. Remember, burrp! is NOT to be used for urgent needs. For medical emergencies, dial 911. Now available from your iPhone and Android! Please provide this summary of care documentation to your next provider. Your primary care clinician is listed as [facility]. If you have any questions after today's visit, please call [phone].

## 2017-03-24 NOTE — PROGRESS NOTES
Patient: Dave Ray                MRN: [id number]       SSN: [id number]  YOB: 1975        AGE: 42 y.o. SEX: male    PCP: Mindy Marley MD  03/24/17    Chief Complaint   Patient presents with    Knee Pain     Right     HISTORY:  Dave Ray is a 42 y.o. male who is seen for right knee pain. He reports increased right knee pain and swelling since 3/16/17. Overall he is doing much better now. He had significant response to a previous right knee aspiration while in the hospital.  He had knee x rays taken at [facility] on 3/16/17. He walks using a single-point cane. Pain Assessment  3/24/2017   Location of Pain Knee   Location Modifiers Right   Severity of Pain 4   Quality of Pain Dull;Aching   Duration of Pain Persistent   Aggravating Factors Bending   Limiting Behavior Some   Relieving Factors Rest     Occupation, etc:  Mr. Brandi Garcia is not diabetic or hypertensive. Current weight is 230 pounds. He states that he was recently hospitalized for an erection lasting over 4 hours after taking chemotherapy for chronic lymphocytic leukemia and had to have his penis drained. His PCP is Dr. Rasta Graves. Weight Metrics 3/24/2017 3/21/2017 3/17/2017 3/13/2017 10/10/2016 9/17/2016   Weight 224 lb 230 lb 232 lb 230 lb 230 lb 230 lb   BMI 24.61 kg/m2 25.27 kg/m2 25.49 kg/m2 25.27 kg/m2 25.27 kg/m2 25.27 kg/m2     Patient Active Problem List   Diagnosis Code    Leukocytosis D72.829    Knee effusion, right M25.461    Myeloproliferative disorder (HCC) D47.1    Anemia D64.9    Splenomegaly R16.1    Acute gout of right knee M10.9    CML (chronic myelocytic leukemia) (HCC) C92.10     REVIEW OF SYSTEMS: All Below are Negative except: See HPI   Constitutional: negative for fever, chills, and weight loss.    Cardiovascular: negative for chest pain, claudication, leg swelling, SOB, MARTINEZ   Gastrointestinal: Negative for pain, N/V/C/D, Blood in stool or urine, dysuria,  hematuria, incontinence, pelvic pain. Musculoskeletal: See HPI   Neurological: Negative for dizziness and weakness. Negative for headaches, Visual changes, confusion, seizures   Phychiatric/Behavioral: Negative for depression, memory loss, substance  abuse. Extremities: Negative for hair changes, rash, or skin lesion changes. Hematologic: Negative for bleeding problems, bruising, pallor or swollen lymph  nodes   Peripheral Vascular: No calf pain, no circulation deficits. Social History     Social History    Marital status: UNKNOWN     Spouse name: N/A    Number of children: N/A    Years of education: N/A     Occupational History    Not on file. Social History Main Topics    Smoking status: Current Every Day Smoker     Packs/day: 0.25     Years: 8.00     Types: Cigarettes    Smokeless tobacco: Never Used    Alcohol use No    Drug use: No    Sexual activity: Not on file     Other Topics Concern    Not on file     Social History Narrative      No Known Allergies   Current Outpatient Prescriptions   Medication Sig    pseudoephedrine (SUDAFED) 30 mg tablet Take 1 Tab by mouth every four (4) hours as needed for Congestion.  allopurinol (ZYLOPRIM) 100 mg tablet Take 3 Tabs by mouth daily.  indomethacin (INDOCIN) 50 mg capsule Take 1 Cap by mouth three (3) times daily (with meals) for 90 days.  ferrous sulfate 325 mg (65 mg iron) tablet Take 1 Tab by mouth daily (with breakfast).  HYDROcodone-acetaminophen (NORCO) 5-325 mg per tablet Take 1 Tab by mouth every four (4) hours as needed for Pain. Max Daily Amount: 6 Tabs.  imatinib (GLEEVEC) 400 mg tablet Take 1 tab po qd     No current facility-administered medications for this visit.        PHYSICAL EXAMINATION:  Visit Vitals    /72 (BP 1 Location: Left arm, BP Patient Position: Sitting)    Pulse 70    Temp 97.1 °F (36.2 °C) (Oral)    Wt 224 lb (101.6 kg)    BMI 24.61 kg/m2      ORTHO EXAMINATION:  Examination Right knee Left knee Skin Intact Intact   Range of motion 120-0 120-0   Effusion 1+ -   Medial joint line tenderness + +   Lateral joint line tenderness - -   Popliteal tenderness - -   Osteophytes palpable + -   Erics - -   Patella crepitus - -   Anterior drawer - -   Lateral laxity - -   Medial laxity - -   Varus deformity - -   Valgus deformity - -   Pretibial edema - -   Calf tenderness - -   Ambulates using a single-point cane    LABS:  URIC ACID 3/17/17  VALUE: 9.2 (H)    RADIOGRAPHS:  XR RIGHT KNEE 3/13/17  IMPRESSION:   1. No acute osseous abnormality. 2. Mild degenerative changes at all 3 compartments  3. Large suprapatellar joint effusion. IMPRESSION:      ICD-10-CM ICD-9-CM    1. Acute idiopathic gout of right knee M10.061 274.01    2. Knee effusion, right M25.461 719.06     Large, suprapatellar   3. Leukocytosis, unspecified type D72.829 288.60    4. CML (chronic myelocytic leukemia) ([facility]) C92.10 205.10    5. Myeloproliferative disorder ([facility] D47.1 238.79    6. Splenomegaly R16.1 789.2    7. Anemia, unspecified type D64.9 285.9    8. Right knee pain, unspecified chronicity M25.561 719.46    9. Primary osteoarthritis of right knee M17.11 715.16     Mild, tricompartmental   10. History of priapism Z87.438 V13.29      PLAN:  He will follow up as needed. He will follow up with his PCP for gout medication prescription.       Scribed by [employer] ([address]) as dictated by Tarun Paez MD

## 2017-03-24 NOTE — PATIENT INSTRUCTIONS
Gout: Care Instructions  Your Care Instructions  Gout is a form of arthritis caused by a buildup of uric acid crystals in a joint. It causes sudden attacks of pain, swelling, redness, and stiffness, usually in one joint, especially the big toe. Gout usually comes on without a cause. But it can be brought on by drinking alcohol (especially beer) or eating seafood and red meat. Taking certain medicines, such as diuretics or aspirin, also can bring on an attack of gout. Taking your medicines as prescribed and following up with your doctor regularly can help you avoid gout attacks in the future. Follow-up care is a key part of your treatment and safety. Be sure to make and go to all appointments, and call your doctor if you are having problems. Its also a good idea to know your test results and keep a list of the medicines you take. How can you care for yourself at home? · If the joint is swollen, put ice or a cold pack on the area for 10 to 20 minutes at a time. Put a thin cloth between the ice and your skin. · Prop up the sore limb on a pillow when you ice it or anytime you sit or lie down during the next 3 days. Try to keep it above the level of your heart. This will help reduce swelling. · Rest sore joints. Avoid activities that put weight or strain on the joints for a few days. Take short rest breaks from your regular activities during the day. · Take your medicines exactly as prescribed. Call your doctor if you think you are having a problem with your medicine. · Take pain medicines exactly as directed. ¨ If the doctor gave you a prescription medicine for pain, take it as prescribed. ¨ If you are not taking a prescription pain medicine, ask your doctor if you can take an over-the-counter medicine. · Eat less seafood and red meat. · Check with your doctor before drinking alcohol. · Losing weight, if you are overweight, may help reduce attacks of gout. But do not go on a [employer]. \" Losing a lot of weight in a short amount of time can cause a gout attack. When should you call for help? Call your doctor now or seek immediate medical care if:  · You have a fever. · The joint is so painful you cannot use it. · You have sudden, unexplained swelling, redness, warmth, or severe pain in one or more joints. Watch closely for changes in your health, and be sure to contact your doctor if:  · You have joint pain. · Your symptoms get worse or are not improving after 2 or 3 days. Where can you learn more? Go to http://gayle-shai.info/. Enter L125 in the search box to learn more about \"Gout: Care Instructions. \"  Current as of: February 24, 2016  Content Version: 11.1  © 7853-9081 [employer]. Care instructions adapted under license by [employer] (which disclaims liability or warranty for this information). If you have questions about a medical condition or this instruction, always ask your healthcare professional. [place] [address] any warranty or liability for your use of this information. Priapism (Prolonged Erection): Care Instructions  Your Care Instructions  Priapism (say \"EHF-rv-rpu-um\") is an erection that does not go away. This happens when the penis fills with fluid without sexual stimulation. And it does not go away after orgasm. It may last on and off for days or weeks. The cause of priapism is often not known. But it can happen to men who have sickle cell disease or diabetes. Taking or using medicine for erection problems may also cause priapism. Your doctor may have removed blood from your penis to ease the pain. And he or she may have given you a shot of medicine to soften the erection. Follow-up care is a key part of your treatment and safety. Be sure to make and go to all appointments, and call your doctor if you are having problems.  It's also a good idea to know your test results and keep a list of the medicines you take.  How can you care for yourself at home? · Put ice or a cold pack on the area for 10 to 20 minutes at a time. Put a thin cloth between the ice and your skin. · Urinate often. Don't wait until you feel the need. · Avoid the medicine that may have caused the painful erection. When should you call for help? Call your doctor now or seek immediate medical care if:  · You have a new painful erection that does not go away. Watch closely for changes in your health, and be sure to contact your doctor if:  · You do not get better as expected. Where can you learn more? Go to http://gayle-shai.info/. Enter B643 in the search box to learn more about \"Priapism (Prolonged Erection): Care Instructions. \"  Current as of: May 24, 2016  Content Version: 11.1  © 4802-6068 [employer]. Care instructions adapted under license by [employer] (which disclaims liability or warranty for this information). If you have questions about a medical condition or this instruction, always ask your healthcare professional. [address] any warranty or liability for your use of this information.

## 2017-03-27 ENCOUNTER — TELEPHONE (OUTPATIENT)
Dept: ONCOLOGY | Age: 42
End: 2017-03-27

## 2017-03-27 NOTE — TELEPHONE ENCOUNTER
Henry @ [facility] called to advise you that she had to send the [facility] prescription to [address].

## 2017-03-28 ENCOUNTER — HOSPITAL ENCOUNTER (OUTPATIENT)
Dept: CT IMAGING | Age: 42
Discharge: HOME | End: 2017-03-28
Attending: FAMILY MEDICINE

## 2017-03-28 DIAGNOSIS — R10.9 ABDOMINAL PAIN: ICD-10-CM

## 2017-03-29 ENCOUNTER — TELEPHONE (OUTPATIENT)
Dept: ONCOLOGY | Age: 42
End: 2017-03-29

## 2017-03-30 ENCOUNTER — TELEPHONE (OUTPATIENT)
Dept: ONCOLOGY | Age: 42
End: 2017-03-30

## 2017-03-30 NOTE — TELEPHONE ENCOUNTER
Mr. Binat Lopezer requested we order GLEEVEC from [address] using overnight if possible and with the following info:    ID# [id number]  GROUP# [id number]  BIN#  [id number]  PCN# [id number]    # [phone]    ANY QUESTONS please contact Mr. Mcneil at [phone]

## 2017-03-31 LAB
BACKGROUND, BCR6T: NORMAL
INTERPRETATION: 480488: NORMAL
LAB DIRECTOR NAME PROVIDER: NORMAL

## 2017-04-06 ENCOUNTER — HOSPITAL ENCOUNTER (OUTPATIENT)
Dept: CT IMAGING | Age: 42
Discharge: HOME | End: 2017-04-06
Attending: FAMILY MEDICINE
Payer: COMMERCIAL

## 2017-04-06 PROCEDURE — 74150 CT ABDOMEN W/O CONTRAST: CPT

## 2017-04-14 ENCOUNTER — OFFICE VISIT (OUTPATIENT)
Dept: ONCOLOGY | Age: 42
End: 2017-04-14

## 2017-04-14 ENCOUNTER — HOSPITAL ENCOUNTER (OUTPATIENT)
Dept: ONCOLOGY | Age: 42
Discharge: HOME | End: 2017-04-14

## 2017-04-14 VITALS
DIASTOLIC BLOOD PRESSURE: 46 MMHG | WEIGHT: 212.6 LBS | SYSTOLIC BLOOD PRESSURE: 114 MMHG | TEMPERATURE: 98.8 F | HEART RATE: 69 BPM | HEIGHT: 78 IN | BODY MASS INDEX: 24.6 KG/M2

## 2017-04-14 DIAGNOSIS — C92.10 CML (CHRONIC MYELOCYTIC LEUKEMIA) (HCC): Primary | ICD-10-CM

## 2017-04-14 DIAGNOSIS — C92.10 CML (CHRONIC MYELOCYTIC LEUKEMIA) (HCC): ICD-10-CM

## 2017-04-14 DIAGNOSIS — D72.829 LEUKOCYTOSIS, UNSPECIFIED TYPE: ICD-10-CM

## 2017-04-14 DIAGNOSIS — D64.9 ANEMIA, UNSPECIFIED TYPE: ICD-10-CM

## 2017-04-14 LAB
BASOPHILS # BLD AUTO: 0 K/UL (ref 0–0.06)
BASOPHILS # BLD: 0 % (ref 0–3)
BLASTS NFR BLD: 2 %
DIFFERENTIAL METHOD BLD: ABNORMAL
EOSINOPHIL # BLD: 0 K/UL (ref 0–0.4)
EOSINOPHIL NFR BLD: 0 % (ref 0–5)
ERYTHROCYTE [DISTWIDTH] IN BLOOD BY AUTOMATED COUNT: 20 % (ref 11.6–14.5)
HCT VFR BLD AUTO: 26.5 % (ref 36–48)
HGB BLD-MCNC: 8.6 G/DL (ref 13–16)
LYMPHOCYTES # BLD AUTO: 4 % (ref 20–51)
LYMPHOCYTES # BLD: 12.2 K/UL (ref 0.8–3.5)
MCH RBC QN AUTO: 28.7 PG (ref 24–34)
MCHC RBC AUTO-ENTMCNC: 32.5 G/DL (ref 31–37)
MCV RBC AUTO: 88.3 FL (ref 74–97)
METAMYELOCYTES NFR BLD MANUAL: 9 %
MONOCYTES # BLD: 3.1 K/UL (ref 0–1)
MONOCYTES NFR BLD AUTO: 1 % (ref 2–9)
MYELOCYTES NFR BLD MANUAL: 14 %
NEUTS BAND NFR BLD MANUAL: 27 % (ref 0–5)
NEUTS SEG # BLD: 210.7 K/UL (ref 1.8–8)
NEUTS SEG NFR BLD AUTO: 42 % (ref 42–75)
NRBC BLD-RTO: 1 PER 100 WBC
PLATELET # BLD AUTO: 399 K/UL (ref 135–420)
PLATELET COMMENTS,PCOM: ABNORMAL
PMV BLD AUTO: 12.8 FL (ref 9.2–11.8)
PROMYELOCYTES NFR BLD MANUAL: 1 %
RBC # BLD AUTO: 3 M/UL (ref 4.7–5.5)
RBC MORPH BLD: ABNORMAL
RBC MORPH BLD: ABNORMAL
WBC # BLD AUTO: 305.3 K/UL (ref 4.6–13.2)

## 2017-04-14 NOTE — PATIENT INSTRUCTIONS
Chronic Myelogenous Leukemia: Care Instructions  Your Care Instructions  Leukemia is a type of cancer that causes your body to make too many blood cells, especially white blood cells. White blood cells are a part of your immune system, which helps protect you from infection and disease. In chronic myelogenous leukemia (CML), large numbers of white blood cells are made by the bone marrow. Over time, these cells may not work as they should, and they may cause symptoms as they begin to crowd out healthy white blood cells and other parts of your blood. But chronic leukemia gets worse slowly, and you may have few or no symptoms for months or years. It is often discovered during a routine blood test.  There are many treatments for CML, including chemotherapy and targeted therapy. A healthy diet, exercise, extra rest, and a strong support system can help you feel better. Many people also find that getting counseling or joining a support group helps them cope with their illness. When you find out that you have cancer, you may feel many emotions and may need some help coping. Seek out family, friends, and counselors for support. You also can do things at home to make yourself feel better while you go through treatment. Call the [employer] (2-433.794.7682) or visit its website at [address] for more information. Follow-up care is a key part of your treatment and safety. Be sure to make and go to all appointments, and call your doctor if you are having problems. It's also a good idea to know your test results and keep a list of the medicines you take. How can you care for yourself at home? · Take your medicines exactly as prescribed. You may get medicine for nausea, vomiting, or pain (although leukemia rarely causes pain). Call your doctor if you think you are having a problem with your medicine. You will get more details on the specific medicines your doctor prescribes. · Eat healthy food.  If you do not feel like eating, try to eat food that has protein and extra calories to keep up your strength and prevent weight loss. Drink liquid meal replacements for extra calories and protein. Try to eat your main meal early. · Get some physical activity every day, but do not get too tired. Keep doing the hobbies you enjoy as your energy allows. · Take steps to control your stress and workload. Learn relaxation techniques. ¨ Share your feelings. Stress and tension affect our emotions. By expressing your feelings to others, you may be able to understand and cope with them. ¨ Consider joining a support group. Talking about a problem with your spouse, a good friend, or other people with similar problems is a good way to reduce tension and stress. ¨ Express yourself through art. Try writing, dance, art, or crafts to relieve tension. Some dance, writing, or art groups may be available just for people who have cancer. ¨ Be kind to your body and mind. Getting enough sleep, eating a nutritious diet, and taking time to do things you enjoy can contribute to an overall feeling of balance in your life and help reduce stress. ¨ Get help if you need it. Discuss your concerns with your doctor or counselor. · If you are vomiting or have diarrhea:  ¨ Drink plenty of fluids (enough so that your urine is light yellow or clear like water) to prevent dehydration. Choose water and other caffeine-free clear liquids. If you have kidney, heart, or liver disease and have to limit fluids, talk with your doctor before you increase the amount of fluids you drink. ¨ When you are able to eat, try clear soups, mild foods, and liquids until all symptoms are gone for 12 to 48 hours. Other good choices include dry toast, crackers, cooked cereal, and gelatin dessert, such as Jell-O.  · Avoid colds and flu. Get a pneumococcal vaccine shot. If you have had one before, ask your doctor whether you need another dose. Get a flu shot every year.  If you must be around people with colds or flu, wash your hands often. · Do not smoke. If you need help quitting, talk to your doctor about stop-smoking programs and medicines. These can increase your chances of quitting for good. When should you call for help? Call 911 anytime you think you may need emergency care. For example, call if:  · You passed out (lost consciousness). · You vomit blood or what looks like coffee grounds. · You pass maroon or very bloody stools. · You have a fever that does not go away or goes higher. · You are very short of breath. Call your doctor now or seek immediate medical care if:  · You have any unusual bleeding, such as:  ¨ Blood spots under the skin. ¨ A nosebleed that you cannot stop. ¨ Bleeding gums when you brush your teeth. ¨ Blood in your urine. ¨ Vaginal bleeding when you are not having your period, or heavy period bleeding. · You are dizzy or lightheaded, or you feel like you may faint. · Your stools are black and tarlike or have streaks of blood. · You have signs of needing more fluids. You have sunken eyes and a dry mouth, and you pass only a little dark urine. · Vomiting has lasted longer than 24 hours and you are not able to keep fluids down. · You get a sudden, severe headache or stiff neck. · You have belly pain, or existing belly pain gets worse. · You have severe diarrhea (large, loose bowel movements every 1 to 2 hours). · You have joint pain or swelling. · You are confused or have trouble thinking clearly. Watch closely for changes in your health, and be sure to contact your doctor if:  · You feel much more tired than usual.  · You have weakness that is getting worse. · You feel sad or anxious for a long period of time, or your feelings interfere with your normal activities or relationships. Where can you learn more? Go to http://gayle-shai.info/.   Enter C246 in the search box to learn more about \"Chronic Myelogenous Leukemia: Care Instructions. \"  Current as of: July 26, 2016  Content Version: 11.2  © 2255-2703 [employer]. Care instructions adapted under license by [employer] (which disclaims liability or warranty for this information). If you have questions about a medical condition or this instruction, always ask your healthcare professional. [place] [address] any warranty or liability for your use of this information.

## 2017-04-14 NOTE — PROGRESS NOTES
Hematology/Oncology  Progress Note    Name: [place] Notice  Date: 2017  : 1975    Carlene Diamond MD     Mr. Willis Mcallister is a 42y.o. year old male who was seen for newly diagnosed  CML    Current Therapy: Gleevec 400mg PO daily      Subjective:     Mr. Willis Mcallister is a 40-year-old -American man who was recently diagnosed with CML After presenting to the emergency room with a swollen left knee. He does have a history of gout. He had arthrocentesis with removal of fluid from the knee. However his CBC revealed that he had a WBC count of about 392,000. The peripheral smear was concerning for possible myeloproliferative disorder. 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 He has a very high tumor burden and he also has extensive hepatomegaly. He was started on Gleevec 400 mg PO daily as well as Allopurinol at a dose of 300 mg daily. He reports he feels well. He has no complaints of concerns to report. He denies abdominal pain or discomfort. Denies dizziness, headaches, fevers. His appetite and energy level are reasonably well. He has no complaints of pain. His  and GI habits are normal.     Past medical history, family history, and social history: these were reviewed and remains unchanged. Past Medical History:   Diagnosis Date    STD (sexually transmitted disease)      Past Surgical History:   Procedure Laterality Date    HX KNEE ARTHROSCOPY       Social History     Social History    Marital status: UNKNOWN     Spouse name: N/A    Number of children: N/A    Years of education: N/A     Occupational History    Not on file.      Social History Main Topics    Smoking status: Current Every Day Smoker     Packs/day: 0.25     Years: 8.00     Types: Cigarettes    Smokeless tobacco: Never Used    Alcohol use No    Drug use: No    Sexual activity: Not on file     Other Topics Concern    Not on file     Social History Narrative     Family History   Problem Relation Age of Onset    Hypertension Maternal Grandmother      Current Outpatient Prescriptions   Medication Sig Dispense Refill    imatinib (GLEEVEC) 400 mg tablet Take 1 tab po qd 30 Tab 6    pseudoephedrine (SUDAFED) 30 mg tablet Take 1 Tab by mouth every four (4) hours as needed for Congestion. 16 Tab 0    HYDROcodone-acetaminophen (NORCO) 5-325 mg per tablet Take 1 Tab by mouth every four (4) hours as needed for Pain. Max Daily Amount: 6 Tabs. 30 Tab 0    allopurinol (ZYLOPRIM) 100 mg tablet Take 3 Tabs by mouth daily. 30 Tab 3    indomethacin (INDOCIN) 50 mg capsule Take 1 Cap by mouth three (3) times daily (with meals) for 90 days. 30 Cap 0    ferrous sulfate 325 mg (65 mg iron) tablet Take 1 Tab by mouth daily (with breakfast). 30 Tab 3       Review of Systems  Constitutional: The patient has no acute distress or discomfort. HEENT: The patient denies recent head trauma, eye pain, blurred vision,  hearing deficit, oropharyngeal mucosal pain or lesions, and the patient denies throat pain or discomfort. Lymphatics: The patient denies palpable peripheral lymphadenopathy. Hematologic: The patient denies having bruising, bleeding, or progressive fatigue. Respiratory: Patient denies having shortness of breath, cough, sputum production, fever, or dyspnea on exertion. Cardiovascular: The patient denies having leg pain, leg swelling, heart palpitations, chest permit, chest pain, or lightheadedness. The patient denies having dyspnea on exertion. Gastrointestinal: The patient denies having nausea, emesis, or diarrhea. The patient denies having any hematemesis or blood in the stool. Genitourinary: Patient denies having urinary urgency, frequency, or dysuria. The patient denies having blood in the urine. Psychological: The patient denies having symptoms of nervousness, anxiety, depression, or thoughts of harming himself some of this.   Skin: Patient denies having skin rashes, skin, ulcerations, or unexplained itching or pruritus. Musculoskeletal: The patient denies having pain in the joints or bones. Objective:     Visit Vitals    /46 (BP 1 Location: Left arm, BP Patient Position: Sitting)    Pulse 69    Temp 98.8 °F (37.1 °C) (Oral)    Ht 6' 6\" (1.981 m)    Wt 96.4 kg (212 lb 9.6 oz)    BMI 24.57 kg/m2     ECOG PS 0  Physical Exam:   Gen. Appearance: The patient is in no acute distress. Skin: There is no bruise or rash. HEENT: The exam is unremarkable. Neck: Supple without lymphadenopathy or thyromegaly. Lungs: Clear to auscultation and percussion; there are no wheezes or rhonchi. Heart: Regular rate and rhythm; there are no murmurs, gallops, or rubs. Abdomen: Bowel sounds are present and normal.  There is no guarding, tenderness, or hepatosplenomegaly. Extremities: There is no clubbing, cyanosis, or edema. Neurologic: There are no focal neurologic deficits. Lymphatics: There is no palpable peripheral lymphadenopathy. Musculoskeletal: The patient has full range of motion at all joints. There is no evidence of joint deformity or effusions. There is no focal joint tenderness. Psychological/psychiatric: There is no clinical evidence of anxiety, depression, or melancholy. Lab data:      No results found for this or any previous visit. Assessment:     1. CML (chronic myelocytic leukemia) ([facility] [address]    2. Anemia, unspecified type    3. Leukocytosis, unspecified type          Plan:   CML: CBC today revealed a preliminary WBC of 213.9, this is down from 392,000. ANC is not yest available. He will continue Gleevec 400mg PO daily. I will check a BCR-ABL1 level at this time as well as a CMP. He will also continue Allopurinol 300mg to prevent TLS. Anemia: H/H today is 9.6/25.9, I will check an EPO,  ferritin and iron profile at this time.  Treatment with Procrit 60,000 units SQ every 2-3 weeks will be initiated whenever H/H is less than 10/30. Leukocytosis: CBC today is 213.9 (preliminary) ANC not yet available. We will continue to monitor these levels every 6 weeks. He recently began treatment with [address] for CML. RTC in 6 weeks for complete reassessment. Orders Placed This Encounter    COMPLETE CBC & AUTO DIFF WBC    InHouse CBC ([facility])     Standing Status:   Future     Number of Occurrences:   1     Standing Expiration Date:   4/21/2017    BCR-ABL1, PCR, QT     Standing Status:   Future     Number of Occurrences:   1     Standing Expiration Date:   2/05/8600    METABOLIC PANEL, COMPREHENSIVE     Standing Status:   Future     Number of Occurrences:   1     Standing Expiration Date:   4/15/2018    IRON PROFILE     Standing Status:   Future     Number of Occurrences:   1     Standing Expiration Date:   4/15/2018    FERRITIN     Standing Status:   Future     Number of Occurrences:   1     Standing Expiration Date:   4/15/2018    ERYTHROPOIETIN     Standing Status:   Future     Standing Expiration Date:   4/15/2018       Alexis Page MD  4/14/2017      Please note: This document has been produced using voice recognition software. Unrecognized errors in transcription may be present.

## 2017-04-14 NOTE — LETTER
NOTIFICATION OF RETURN TO WORK / SCHOOL 
 
4/14/2017 4:51 PM 
 
Mr. Richard Neumann [address] [facility] [address] [place] To Whom It May Concern: 
 
Richard Neumann was under the care of [facility] . He will be able to return to work/school on 4/24/2017 with regular duties and/or activities . If there are questions or concerns please have the patient contact our office. Sincerely, ABBI Hill

## 2017-04-14 NOTE — MR AVS SNAPSHOT
Visit Information Date & Time Provider Department Dept. Phone Encounter #  
 4/14/2017  4:15 PM Loly Mcclelland [address] Office [phone] [id number] Follow-up Instructions Return in about 6 weeks (around 5/26/2017). Your Appointments 5/26/2017 11:30 AM  
Office Visit with Loly Mcclelland MD  
[facility] [address]) Appt Note: 6 wk fu  
 [address] [address]  
[phone]  
  
   
 [facility] [address] [facility] [address]  
  
    
  
 5/5/2017  3:45 PM  
Any with Poornima Marino MD  
[facility] ([address]) [place] [address] [facility] [address]  
[address] [facility] [address] [facility] [address] [facility] [address] Upcoming Health Maintenance Date Due Pneumococcal 19-64 Highest Risk (1 of 3 - PCV13) 10/4/1993 DTaP/Tdap/Td series (1 - Tdap) 10/4/1995 INFLUENZA AGE 9 TO ADULT 8/1/2016 Allergies as of 4/14/2017  Review Complete On: 4/14/2017 By: Oriana Navarro NP No Known Allergies Current Immunizations  Never Reviewed No immunizations on file. Not reviewed this visit You Were Diagnosed With   
  
 Codes Comments CML (chronic myelocytic leukemia) ([facility]    -  Primary ICD-10-CM: C92.10 ICD-9-CM: 205.10 Anemia, unspecified type     ICD-10-CM: D64.9 ICD-9-CM: 285.9 Leukocytosis, unspecified type     ICD-10-CM: D72.829 ICD-9-CM: 288.60 Vitals BP Pulse Temp Height(growth percentile) Weight(growth percentile) BMI  
 114/46 (BP 1 Location: Left arm, BP Patient Position: Sitting) 69 98.8 °F (37.1 °C) (Oral) 6' 6\" (1.981 m) 212 lb 9.6 oz (96.4 kg) 24.57 kg/m2 Smoking Status Current Every Day Smoker BMI and BSA Data Body Mass Index Body Surface Area 24.57 kg/m 2 2.3 m 2 Preferred Pharmacy Pharmacy Name Phone [facility] [facility] [address] [facility], [address] [place] [address] [phone] Your Updated Medication List  
  
   
This list is accurate as of: 4/14/17  4:53 PM.  Always use your most recent med list.  
  
  
  
  
 allopurinol 100 mg tablet Commonly known as:  Fatemehjeffrey Ericney Take 3 Tabs by mouth daily. ferrous sulfate 325 mg (65 mg iron) tablet Take 1 Tab by mouth daily (with breakfast). HYDROcodone-acetaminophen 5-325 mg per tablet Commonly known as:  Alisa Cruise Take 1 Tab by mouth every four (4) hours as needed for Pain. Max Daily Amount: 6 Tabs. imatinib 400 mg tablet Commonly known as:  GLEEVEC Take 1 tab po qd  
  
 indomethacin 50 mg capsule Commonly known as:  INDOCIN Take 1 Cap by mouth three (3) times daily (with meals) for 90 days. pseudoephedrine 30 mg tablet Commonly known as:  SUDAFED Take 1 Tab by mouth every four (4) hours as needed for Congestion. We Performed the Following BCR-ABL1, PCR, QT [[id number] Custom] COMPLETE CBC & AUTO DIFF WBC [75502 CPT(R)] FERRITIN [25224 CPT(R)] IRON PROFILE [id number] CPT(R)] METABOLIC PANEL, COMPREHENSIVE [04338 CPT(R)] Follow-up Instructions Return in about 6 weeks (around 5/26/2017). To-Do List   
 04/14/2017 Lab:  BCR-ABL1, PCR, QT   
  
 04/14/2017 Lab:  CBC WITH 3 PART DIFF   
  
 04/14/2017 Lab:  METABOLIC PANEL, COMPREHENSIVE   
  
 04/15/2017 Lab:  FERRITIN   
  
 04/15/2017 Lab:  IRON PROFILE Patient Instructions Chronic Myelogenous Leukemia: Care Instructions Your Care Instructions Leukemia is a type of cancer that causes your body to make too many blood cells, especially white blood cells. White blood cells are a part of your immune system, which helps protect you from infection and disease. In chronic myelogenous leukemia (CML), large numbers of white blood cells are made by the bone marrow.  Over time, these cells may not work as they should, and they may cause symptoms as they begin to crowd out healthy white blood cells and other parts of your blood. But chronic leukemia gets worse slowly, and you may have few or no symptoms for months or years. It is often discovered during a routine blood test. 
There are many treatments for CML, including chemotherapy and targeted therapy. A healthy diet, exercise, extra rest, and a strong support system can help you feel better. Many people also find that getting counseling or joining a support group helps them cope with their illness. When you find out that you have cancer, you may feel many emotions and may need some help coping. Seek out family, friends, and counselors for support. You also can do things at home to make yourself feel better while you go through treatment. Call the [employer] (5-896.414.3037) or visit its website at [employer] [employer] for more information. Follow-up care is a key part of your treatment and safety. Be sure to make and go to all appointments, and call your doctor if you are having problems. It's also a good idea to know your test results and keep a list of the medicines you take. How can you care for yourself at home? · Take your medicines exactly as prescribed. You may get medicine for nausea, vomiting, or pain (although leukemia rarely causes pain). Call your doctor if you think you are having a problem with your medicine. You will get more details on the specific medicines your doctor prescribes. · Eat healthy food. If you do not feel like eating, try to eat food that has protein and extra calories to keep up your strength and prevent weight loss. Drink liquid meal replacements for extra calories and protein. Try to eat your main meal early. · Get some physical activity every day, but do not get too tired. Keep doing the hobbies you enjoy as your energy allows. · Take steps to control your stress and workload. Learn relaxation techniques. ¨ Share your feelings. Stress and tension affect our emotions. By expressing your feelings to others, you may be able to understand and cope with them. ¨ Consider joining a support group. Talking about a problem with your spouse, a good friend, or other people with similar problems is a good way to reduce tension and stress. ¨ Express yourself through art. Try writing, dance, art, or crafts to relieve tension. Some dance, writing, or art groups may be available just for people who have cancer. ¨ Be kind to your body and mind. Getting enough sleep, eating a nutritious diet, and taking time to do things you enjoy can contribute to an overall feeling of balance in your life and help reduce stress. ¨ Get help if you need it. Discuss your concerns with your doctor or counselor. · If you are vomiting or have diarrhea: ¨ Drink plenty of fluids (enough so that your urine is light yellow or clear like water) to prevent dehydration. Choose water and other caffeine-free clear liquids. If you have kidney, heart, or liver disease and have to limit fluids, talk with your doctor before you increase the amount of fluids you drink. ¨ When you are able to eat, try clear soups, mild foods, and liquids until all symptoms are gone for 12 to 48 hours. Other good choices include dry toast, crackers, cooked cereal, and gelatin dessert, such as Jell-O. · Avoid colds and flu. Get a pneumococcal vaccine shot. If you have had one before, ask your doctor whether you need another dose. Get a flu shot every year. If you must be around people with colds or flu, wash your hands often. · Do not smoke. If you need help quitting, talk to your doctor about stop-smoking programs and medicines. These can increase your chances of quitting for good. When should you call for help? Call 911 anytime you think you may need emergency care. For example, call if: 
· You passed out (lost consciousness). · You vomit blood or what looks like coffee grounds. · You pass maroon or very bloody stools. · You have a fever that does not go away or goes higher. · You are very short of breath. Call your doctor now or seek immediate medical care if: 
· You have any unusual bleeding, such as: ¨ Blood spots under the skin. ¨ A nosebleed that you cannot stop. ¨ Bleeding gums when you brush your teeth. ¨ Blood in your urine. ¨ Vaginal bleeding when you are not having your period, or heavy period bleeding. · You are dizzy or lightheaded, or you feel like you may faint. · Your stools are black and tarlike or have streaks of blood. · You have signs of needing more fluids. You have sunken eyes and a dry mouth, and you pass only a little dark urine. · Vomiting has lasted longer than 24 hours and you are not able to keep fluids down. · You get a sudden, severe headache or stiff neck. · You have belly pain, or existing belly pain gets worse. · You have severe diarrhea (large, loose bowel movements every 1 to 2 hours). · You have joint pain or swelling. · You are confused or have trouble thinking clearly. Watch closely for changes in your health, and be sure to contact your doctor if: 
· You feel much more tired than usual. 
· You have weakness that is getting worse. · You feel sad or anxious for a long period of time, or your feelings interfere with your normal activities or relationships. Where can you learn more? Go to http://gayle-shai.info/. Enter C276 in the search box to learn more about \"Chronic Myelogenous Leukemia: Care Instructions. \" Current as of: July 26, 2016 Content Version: 11.2 © 6858-6423 [employer]. Care instructions adapted under license by [employer] (which disclaims liability or warranty for this information).  If you have questions about a medical condition or this instruction, always ask your healthcare professional. [employer] disclaims any warranty or liability for your use of this information. Please provide this summary of care documentation to your next provider. Your primary care clinician is listed as Chris Marie. If you have any questions after today's visit, please call [phone].

## 2017-04-15 LAB
A-G RATIO,AGRAT: 1.3 RATIO (ref 1.1–2.6)
ALBUMIN SERPL-MCNC: 4.2 G/DL (ref 3.5–5)
ALP SERPL-CCNC: 81 U/L (ref 25–115)
ALT SERPL-CCNC: 14 U/L (ref 5–40)
ANION GAP SERPL CALC-SCNC: 17 MMOL/L
AST SERPL W P-5'-P-CCNC: 17 U/L (ref 10–37)
BILIRUB SERPL-MCNC: 0.3 MG/DL (ref 0.2–1.2)
BUN SERPL-MCNC: 25 MG/DL (ref 6–22)
CALCIUM SERPL-MCNC: 8.6 MG/DL (ref 8.4–10.4)
CHLORIDE SERPL-SCNC: 102 MMOL/L (ref 98–110)
CO2 SERPL-SCNC: 21 MMOL/L (ref 20–32)
CREAT SERPL-MCNC: 1.9 MG/DL (ref 0.5–1.2)
FE % SATURATION,PSAT: 33 % (ref 20–50)
FERRITIN SERPL-MCNC: 763 NG/ML (ref 22–322)
GFRAA, 66117: 46.8
GFRNA, 66118: 38.6
GLOBULIN,GLOB: 3.3 G/DL (ref 2–4)
GLUCOSE SERPL-MCNC: 90 MG/DL (ref 65–99)
IRON,IRN: 78 MCG/DL (ref 45–160)
POTASSIUM SERPL-SCNC: 4.6 MMOL/L (ref 3.5–5.5)
PROT SERPL-MCNC: 7.5 G/DL (ref 6.4–8.3)
SODIUM SERPL-SCNC: 140 MMOL/L (ref 133–145)
TIBC,TIBC: 233 MCG/DL (ref 228–428)
UIBC SERPL-MCNC: 155 MCG/DL (ref 110–370)

## 2017-04-17 NOTE — PROGRESS NOTES
Critical Results for WBC were noted and reviewed. The patient has CML and is taking Gleevac for treatment.

## 2017-04-20 LAB
B2B2 TRANSCRIPT: 65.15 %
B3B2 TRANSCRIPT: 61.77 %
BACKGROUND BCR-ABL: ABNORMAL
DIRECTOR REVIEW BCR-ABL: ABNORMAL
E1A2 TRANSCRIPT 480502: 0.07 %
INTERPRETATION BCR-ABL: ABNORMAL
METHODOLOGY BCR-ABL1: ABNORMAL

## 2017-04-28 ENCOUNTER — HOSPITAL ENCOUNTER (OUTPATIENT)
Dept: INFUSION THERAPY | Age: 42
Discharge: HOME | End: 2017-04-28
Payer: COMMERCIAL

## 2017-04-28 ENCOUNTER — CLINICAL SUPPORT (OUTPATIENT)
Dept: ONCOLOGY | Age: 42
End: 2017-04-28

## 2017-04-28 ENCOUNTER — HOSPITAL ENCOUNTER (OUTPATIENT)
Dept: ONCOLOGY | Age: 42
Discharge: HOME | End: 2017-04-28

## 2017-04-28 VITALS
DIASTOLIC BLOOD PRESSURE: 58 MMHG | HEIGHT: 78 IN | SYSTOLIC BLOOD PRESSURE: 95 MMHG | HEART RATE: 61 BPM | BODY MASS INDEX: 24.53 KG/M2 | RESPIRATION RATE: 16 BRPM | WEIGHT: 212 LBS | TEMPERATURE: 99.5 F

## 2017-04-28 DIAGNOSIS — D64.9 ANEMIA, UNSPECIFIED: ICD-10-CM

## 2017-04-28 DIAGNOSIS — D64.9 ANEMIA, UNSPECIFIED: Primary | ICD-10-CM

## 2017-04-28 LAB
BASO+EOS+MONOS # BLD AUTO: 1.3 K/UL (ref 0–2.3)
BASO+EOS+MONOS # BLD AUTO: 7 % (ref 0.1–17)
DIFFERENTIAL METHOD BLD: ABNORMAL
ERYTHROCYTE [DISTWIDTH] IN BLOOD BY AUTOMATED COUNT: 20.6 % (ref 11.5–14.5)
HCT VFR BLD AUTO: 26.2 % (ref 36–48)
HGB BLD-MCNC: 8.4 G/DL (ref 12–16)
LYMPHOCYTES # BLD AUTO: 14 % (ref 14–44)
LYMPHOCYTES # BLD: 2.4 K/UL (ref 1.1–5.9)
MCH RBC QN AUTO: 28.7 PG (ref 25–35)
MCHC RBC AUTO-ENTMCNC: 32.1 G/DL (ref 31–37)
MCV RBC AUTO: 89.4 FL (ref 78–102)
NEUTS SEG # BLD: 13.8 K/UL (ref 1.8–9.5)
NEUTS SEG NFR BLD AUTO: 79 % (ref 40–70)
PLATELET # BLD AUTO: 267 K/UL (ref 135–420)
RBC # BLD AUTO: 2.93 M/UL (ref 4.1–5.1)
WBC # BLD AUTO: 17.5 K/UL (ref 4.5–13)

## 2017-04-28 PROCEDURE — 74011250636 HC RX REV CODE- 250/636: Performed by: INTERNAL MEDICINE

## 2017-04-28 PROCEDURE — 36415 COLL VENOUS BLD VENIPUNCTURE: CPT

## 2017-04-28 PROCEDURE — 96372 THER/PROPH/DIAG INJ SC/IM: CPT

## 2017-04-28 RX ADMIN — ERYTHROPOIETIN 40000 UNITS: 40000 INJECTION, SOLUTION INTRAVENOUS; SUBCUTANEOUS at 16:40

## 2017-04-28 RX ADMIN — ERYTHROPOIETIN 20000 UNITS: 20000 INJECTION, SOLUTION INTRAVENOUS; SUBCUTANEOUS at 16:40

## 2017-04-28 NOTE — PROGRESS NOTES
[facility] Progress Note    Date: 2017    Name: Roberto Madrigal    MRN: [id number]         : 1975      Mr. Sunil Gilmore arrived in the [facility] today, at 1550, in stable condition, here for Q 2 Week, CBC/Procrit injection. He was assessed and education was provided. Mr. Ole De La Garza vitals were reviewed. Visit Vitals    BP 95/58 (BP 1 Location: Left arm, BP Patient Position: At rest;Sitting)    Pulse 61    Temp 99.5 °F (37.5 °C)    Resp 16    Ht 6' 8\" (2.032 m)    Wt 96.2 kg (212 lb)    BMI 23.29 kg/m2           CBC was drawn from his left AC, at 1603, without incident. Lab results were obtained and reviewed, and the preliminary platelet count was noted to be 267,000. Recent Results (from the past 12 hour(s))   CBC WITH 3 PART DIFF    Collection Time: 17  3:55 PM   Result Value Ref Range    WBC 17.5 (H) 4.5 - 13.0 K/uL    RBC 2.93 (L) 4.10 - 5.10 M/uL    HGB 8.4 (L) 12.0 - 16.0 g/dL    HCT 26.2 (L) 36 - 48 %    MCV 89.4 78 - 102 FL    MCH 28.7 25.0 - 35.0 PG    MCHC 32.1 31 - 37 g/dL    RDW 20.6 (H) 11.5 - 14.5 %    NEUTROPHILS 79 (H) 40 - 70 %    MIXED CELLS 7 0.1 - 17 %    LYMPHOCYTES 14 14 - 44 %    ABS. NEUTROPHILS 13.8 (H) 1.8 - 9.5 K/UL    ABS. MIXED CELLS 1.3 0.0 - 2.3 K/uL    ABS. LYMPHOCYTES 2.4 1.1 - 5.9 K/UL    DF AUTOMATED                   Procrit 60,000 units, was administered SQ, in his left arm, at 1640,  as ordered, and without incident. Mr. Sunil Gilmore tolerated well, and had no complaints. Mr. Sunil Gilmore was discharged from [place] [address] in stable condition at [phone]. Spring Ask He is to return in 2 weeks, on Friday, 17,  at 1400,  for his next appointment, for Q 2 Week, CBC/Procrit injection.      Mike Johnson RN  2017  4:18 PM

## 2017-05-12 ENCOUNTER — HOSPITAL ENCOUNTER (OUTPATIENT)
Dept: ONCOLOGY | Age: 42
Discharge: HOME | End: 2017-05-12

## 2017-05-12 ENCOUNTER — HOSPITAL ENCOUNTER (OUTPATIENT)
Dept: INFUSION THERAPY | Age: 42
Discharge: HOME | End: 2017-05-12
Payer: COMMERCIAL

## 2017-05-12 ENCOUNTER — CLINICAL SUPPORT (OUTPATIENT)
Dept: ONCOLOGY | Age: 42
End: 2017-05-12

## 2017-05-12 VITALS
HEART RATE: 61 BPM | TEMPERATURE: 98.6 F | RESPIRATION RATE: 16 BRPM | DIASTOLIC BLOOD PRESSURE: 76 MMHG | SYSTOLIC BLOOD PRESSURE: 116 MMHG

## 2017-05-12 DIAGNOSIS — D64.9 ANEMIA, UNSPECIFIED: Primary | ICD-10-CM

## 2017-05-12 DIAGNOSIS — D64.9 ANEMIA, UNSPECIFIED: ICD-10-CM

## 2017-05-12 LAB
BASO+EOS+MONOS # BLD AUTO: 0.4 K/UL (ref 0–2.3)
BASO+EOS+MONOS # BLD AUTO: 3 % (ref 0.1–17)
DIFFERENTIAL METHOD BLD: ABNORMAL
ERYTHROCYTE [DISTWIDTH] IN BLOOD BY AUTOMATED COUNT: 19 % (ref 11.5–14.5)
HCT VFR BLD AUTO: 33.8 % (ref 36–48)
HGB BLD-MCNC: 10.8 G/DL (ref 12–16)
LYMPHOCYTES # BLD AUTO: 17 % (ref 14–44)
LYMPHOCYTES # BLD: 1.9 K/UL (ref 1.1–5.9)
MCH RBC QN AUTO: 28.7 PG (ref 25–35)
MCHC RBC AUTO-ENTMCNC: 32 G/DL (ref 31–37)
MCV RBC AUTO: 89.9 FL (ref 78–102)
NEUTS SEG # BLD: 8.9 K/UL (ref 1.8–9.5)
NEUTS SEG NFR BLD AUTO: 79 % (ref 40–70)
PLATELET # BLD AUTO: 257 K/UL (ref 135–420)
RBC # BLD AUTO: 3.76 M/UL (ref 4.1–5.1)
WBC # BLD AUTO: 11.2 K/UL (ref 4.5–13)

## 2017-05-12 PROCEDURE — 36415 COLL VENOUS BLD VENIPUNCTURE: CPT

## 2017-05-12 NOTE — PROGRESS NOTES
[address] [facility] Progress Note    Date: May 12, 2017    Name: Shaquille Wilkerson    MRN: [id number]         : 1975      Mr. Mcneil arrived in the [facility] today, at [phone], in stable condition, here for Q 2 Week, CBC/Procrit injection. He was assessed and education was provided. Mr. Cinthya Traylor vitals were reviewed. Visit Vitals    /76 (BP 1 Location: Left arm, BP Patient Position: At rest;Sitting)    Pulse 61    Temp 98.6 °F (37 °C)    Resp 16           CBC was drawn from his left AC, at 1414, without incident. Lab results were obtained and reviewed, and the preliminary platelet count was noted to be 246,000. Recent Results (from the past 12 hour(s))   CBC WITH 3 PART DIFF    Collection Time: 17  2:14 PM   Result Value Ref Range    WBC 11.2 4.5 - 13.0 K/uL    RBC 3.76 (L) 4.10 - 5.10 M/uL    HGB 10.8 (L) 12.0 - 16.0 g/dL    HCT 33.8 (L) 36 - 48 %    MCV 89.9 78 - 102 FL    MCH 28.7 25.0 - 35.0 PG    MCHC 32.0 31 - 37 g/dL    RDW 19.0 (H) 11.5 - 14.5 %    NEUTROPHILS 79 (H) 40 - 70 %    MIXED CELLS 3 0.1 - 17 %    LYMPHOCYTES 17 14 - 44 %    ABS. NEUTROPHILS 8.9 1.8 - 9.5 K/UL    ABS. MIXED CELLS 0.4 0.0 - 2.3 K/uL    ABS. LYMPHOCYTES 1.9 1.1 - 5.9 K/UL    DF AUTOMATED             Procrit injection was HELD today, per order. Mr. Ascencion Pérez tolerated well, and had no complaints. Mr. Ascencion Pérez was discharged from [place] [address] in stable condition at 1430. Earnest Bo  He is to return in 2 weeks, on Friday, 17, at 1300,  for his next appointment, for CBC/Procrit injection. (following his 1130 office visit with Dr. Janine Birch)    Gonzalo Pisano RN  May 12, 2017  2:22 PM

## 2017-05-26 ENCOUNTER — HOSPITAL ENCOUNTER (OUTPATIENT)
Dept: ONCOLOGY | Age: 42
Discharge: HOME | End: 2017-05-26

## 2017-05-26 ENCOUNTER — OFFICE VISIT (OUTPATIENT)
Dept: ONCOLOGY | Age: 42
End: 2017-05-26

## 2017-05-26 VITALS
BODY MASS INDEX: 26.38 KG/M2 | HEART RATE: 43 BPM | WEIGHT: 228 LBS | SYSTOLIC BLOOD PRESSURE: 116 MMHG | TEMPERATURE: 98.2 F | HEIGHT: 78 IN | DIASTOLIC BLOOD PRESSURE: 72 MMHG

## 2017-05-26 DIAGNOSIS — C92.10 CML (CHRONIC MYELOCYTIC LEUKEMIA) (HCC): Primary | ICD-10-CM

## 2017-05-26 DIAGNOSIS — R19.02 LEFT UPPER QUADRANT ABDOMINAL MASS: ICD-10-CM

## 2017-05-26 DIAGNOSIS — R16.1 SPLENOMEGALY: ICD-10-CM

## 2017-05-26 DIAGNOSIS — D47.1 MYELOPROLIFERATIVE DISORDER (HCC): ICD-10-CM

## 2017-05-26 DIAGNOSIS — D64.81 ANTINEOPLASTIC CHEMOTHERAPY INDUCED ANEMIA: ICD-10-CM

## 2017-05-26 DIAGNOSIS — T45.1X5A ANTINEOPLASTIC CHEMOTHERAPY INDUCED ANEMIA: ICD-10-CM

## 2017-05-26 DIAGNOSIS — N50.89 TESTICULAR SWELLING: ICD-10-CM

## 2017-05-26 DIAGNOSIS — C92.10 CML (CHRONIC MYELOCYTIC LEUKEMIA) (HCC): ICD-10-CM

## 2017-05-26 LAB
BASO+EOS+MONOS # BLD AUTO: 0.3 K/UL (ref 0–2.3)
BASO+EOS+MONOS # BLD AUTO: 4 % (ref 0.1–17)
DIFFERENTIAL METHOD BLD: ABNORMAL
ERYTHROCYTE [DISTWIDTH] IN BLOOD BY AUTOMATED COUNT: 17.7 % (ref 11.5–14.5)
HCT VFR BLD AUTO: 34.2 % (ref 36–48)
HGB BLD-MCNC: 11 G/DL (ref 12–16)
LYMPHOCYTES # BLD AUTO: 18 % (ref 14–44)
LYMPHOCYTES # BLD: 1.5 K/UL (ref 1.1–5.9)
MCH RBC QN AUTO: 28.6 PG (ref 25–35)
MCHC RBC AUTO-ENTMCNC: 32.2 G/DL (ref 31–37)
MCV RBC AUTO: 88.8 FL (ref 78–102)
NEUTS SEG # BLD: 6.8 K/UL (ref 1.8–9.5)
NEUTS SEG NFR BLD AUTO: 79 % (ref 40–70)
PLATELET # BLD AUTO: 164 K/UL (ref 135–420)
RBC # BLD AUTO: 3.85 M/UL (ref 4.1–5.1)
WBC # BLD AUTO: 8.6 K/UL (ref 4.5–13)

## 2017-05-26 NOTE — PROGRESS NOTES
Hematology/Oncology  Progress Note    Name: Jacklyn Quivers  Date: 2017  : 1975    Per Pascual MD     Mr. Terri Lyman is a 42y.o. year old male who was seen for newly diagnosed  CML    Current Therapy: Gleevec 400mg PO daily      Subjective:     Mr. Terri Lyman is a 79-year-old -American man who was recently diagnosed with CML After presenting to the emergency room with a swollen left knee. He does have a history of gout. He had arthrocentesis with removal of fluid from the knee. However his CBC revealed that he had a WBC count of about 392,000. The peripheral smear was concerning for possible myeloproliferative disorder. 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 He has a very high tumor burden and he also has extensive hepatomegaly. He was started on Gleevec 400 mg PO daily as well as Allopurinol at a dose of 300 mg daily. He reports he feels well. He has no complaints of concerns to report. He denies abdominal pain or discomfort. Denies dizziness, headaches, fevers. His appetite and energy level are reasonably well. He has no complaints of pain. His  and GI habits are normal.  The patient reports that his appetite and energy level are continuing to improve. He continues to tolerate the Gleevec without untoward side effects. He does have evidence of therapy induced anemia. Past medical history, family history, and social history: these were reviewed and remains unchanged. Past Medical History:   Diagnosis Date    Anemia     Arthritis     Right knee    CML [facility]chronic myelocytic [facility]) ([facility]     STD (sexually transmitted disease)      Past Surgical History:   Procedure Laterality Date    HX KNEE ARTHROSCOPY       Social History     Social History    Marital status: UNKNOWN     Spouse name: N/A    Number of children: N/A    Years of education: N/A     Occupational History    Not on file.      Social History Main Topics    Smoking status: Current Every Day Smoker     Packs/day: 0.25     Years: 8.00     Types: Cigarettes    Smokeless tobacco: Never Used    Alcohol use No    Drug use: No    Sexual activity: Not on file     Other Topics Concern    Not on file     Social History Narrative     Family History   Problem Relation Age of Onset    Hypertension Maternal Grandmother      Current Outpatient Prescriptions   Medication Sig Dispense Refill    imatinib (GLEEVEC) 400 mg tablet Take 1 tab po qd 30 Tab 6    pseudoephedrine (SUDAFED) 30 mg tablet Take 1 Tab by mouth every four (4) hours as needed for Congestion. 16 Tab 0    HYDROcodone-acetaminophen (NORCO) 5-325 mg per tablet Take 1 Tab by mouth every four (4) hours as needed for Pain. Max Daily Amount: 6 Tabs. 30 Tab 0    allopurinol (ZYLOPRIM) 100 mg tablet Take 3 Tabs by mouth daily. 30 Tab 3    indomethacin (INDOCIN) 50 mg capsule Take 1 Cap by mouth three (3) times daily (with meals) for 90 days. 30 Cap 0    ferrous sulfate 325 mg (65 mg iron) tablet Take 1 Tab by mouth daily (with breakfast). 30 Tab 3       Review of Systems  Constitutional: The patient has no acute distress or discomfort. HEENT: The patient denies recent head trauma, eye pain, blurred vision,  hearing deficit, oropharyngeal mucosal pain or lesions, and the patient denies throat pain or discomfort. Lymphatics: The patient denies palpable peripheral lymphadenopathy. Hematologic: The patient denies having bruising, bleeding, or progressive fatigue. Respiratory: Patient denies having shortness of breath, cough, sputum production, fever, or dyspnea on exertion. Cardiovascular: The patient denies having leg pain, leg swelling, heart palpitations, chest permit, chest pain, or lightheadedness. The patient denies having dyspnea on exertion. Gastrointestinal: The patient denies having nausea, emesis, or diarrhea.  The patient denies having any hematemesis or blood in the stool.  Genitourinary: Patient denies having urinary urgency, frequency, or dysuria. The patient denies having blood in the urine. Psychological: The patient denies having symptoms of nervousness, anxiety, depression, or thoughts of harming himself some of this. Skin: Patient denies having skin rashes, skin, ulcerations, or unexplained itching or pruritus. Musculoskeletal: The patient denies having pain in the joints or bones. Objective:     Visit Vitals    /72    Pulse (!) 43    Temp 98.2 °F (36.8 °C)    Ht 6' 8\" (2.032 m)    Wt 103.4 kg (228 lb)    BMI 25.05 kg/m2     ECOG PS 0  Physical Exam:   Gen. Appearance: The patient is in no acute distress. Skin: There is no bruise or rash. HEENT: The exam is unremarkable. Neck: Supple without lymphadenopathy or thyromegaly. Lungs: Clear to auscultation and percussion; there are no wheezes or rhonchi. Heart: Regular rate and rhythm; there are no murmurs, gallops, or rubs. Abdomen: Bowel sounds are present and normal.  There is no guarding, tenderness, or hepatosplenomegaly. Extremities: There is no clubbing, cyanosis, or edema. Neurologic: There are no focal neurologic deficits. Lymphatics: There is no palpable peripheral lymphadenopathy. Musculoskeletal: The patient has full range of motion at all joints. There is no evidence of joint deformity or effusions. There is no focal joint tenderness. Psychological/psychiatric: There is no clinical evidence of anxiety, depression, or melancholy.     Lab data:      Results for orders placed or performed during the hospital encounter of 05/26/17   CBC WITH 3 PART DIFF     Status: Abnormal   Result Value Ref Range Status    WBC 8.6 4.5 - 13.0 K/uL Final    RBC 3.85 (L) 4.10 - 5.10 M/uL Final    HGB 11.0 (L) 12.0 - 16.0 g/dL Final    HCT 34.2 (L) 36 - 48 % Final    MCV 88.8 78 - 102 FL Final    MCH 28.6 25.0 - 35.0 PG Final    MCHC 32.2 31 - 37 g/dL Final    RDW 17.7 (H) 11.5 - 14.5 % Final NEUTROPHILS 79 (H) 40 - 70 % Final    MIXED CELLS 4 0.1 - 17 % Final    LYMPHOCYTES 18 14 - 44 % Final    ABS. NEUTROPHILS 6.8 1.8 - 9.5 K/UL Final    ABS. MIXED CELLS 0.3 0.0 - 2.3 K/uL Final    ABS. LYMPHOCYTES 1.5 1.1 - 5.9 K/UL Final     Comment: Test performed at [address] Results Reviewed by Medical Director. DF AUTOMATED   Final           Assessment:     1. CML (chronic myelocytic leukemia) ([facility]    2. Myeloproliferative disorder ([facility]    3. Splenomegaly          Plan:   CML: The CBC from today shows that his WBC counts are now normal at 8.6. The absolute neutrophil count is normal at 6.8 with an absolute lymphocyte count of 1.5. He will continue Gleevec 400mg PO daily. I will check a BCR-ABL1 level at this time as well as a CMP. He will also continue Allopurinol 300mg to prevent TLS. Chemotherapy-induced anemia: At this time the CBC shows that his hemoglobin is 11 g/dL with hematocrit of 34.2%. I will check his iron profile and ferritin levels at this time. I have explained to the patient that systemic treatment with Procrit at a dose of 60,000 units subcutaneous every 2 weeks is reserved for hemoglobin level is below 10 g/dL with hematocrit levels below 30%. Leukocytosis: Extensive leukocytosis. His WBC count is 8.6 but neutrophilia persists with 79% neutrophils. The absolute neutrophil count is actually normal at 6.8 and the absolute lymphocyte count is normal at 1.5. RTC in 6 weeks for complete reassessment. Orders Placed This Encounter    COMPLETE CBC & AUTO DIFF WBC    InHouse CBC ([facility])     Standing Status:   Future     Number of Occurrences:   1     Standing Expiration Date:   6/2/2017       Alexis Page MD  5/26/2017      Please note: This document has been produced using voice recognition software. Unrecognized errors in transcription may be present.

## 2017-05-26 NOTE — MR AVS SNAPSHOT
Visit Information Date & Time Provider Department Dept. Phone Encounter #  
 5/26/2017 11:30 AM Adonis Siemens, [address] Office [phone] [id number] Follow-up Instructions Return in about 2 months (around 7/26/2017). Your Appointments 5/26/2017  1:00 PM  
Nurse Visit with Ridge KUMAR Office ([facility]) Appt Note: OPIC CBC; [address] [facility] [address] [facility] [address]  
[phone]  
  
   
 [facility] [address] [facility] [address]  
  
    
 6/8/2017  2:45 PM  
ESTABLISHED PATIENT with Vamsi Lozano MD  
[facility] ([facility]) Appt Note: FOLLOW UP;EPIDYMITIS PER PT REQUEST; lvm appt needing to be moved due to dr out of office 04/27/17  
 [address] [facility] [address] [facility] [address]  
[address] [address]  
  
    
 7/25/2017  2:00 PM  
Office Visit with Adonis Siemens, MD [address] [facility]) Appt Note: 2 mo fu  
 [facility] [address] [facility] [address]  
[phone]  
  
   
 [facility] [address] Upcoming Health Maintenance Date Due Pneumococcal 19-64 Highest Risk (1 of 3 - PCV13) 10/4/1993 DTaP/Tdap/Td series (1 - Tdap) 10/4/1995 INFLUENZA AGE 9 TO ADULT 8/1/2017 Allergies as of 5/26/2017  Review Complete On: 5/26/2017 By: Adonis Siemens, MD  
 No Known Allergies Current Immunizations  Reviewed on 5/12/2017 No immunizations on file. Not reviewed this visit You Were Diagnosed With   
  
 Codes Comments CML (chronic myelocytic leukemia) ([facility] [address]    -  Primary ICD-10-CM: C92.10 ICD-9-CM: 205.10 Myeloproliferative disorder ([facility] [address]     ICD-10-CM: D47.1 ICD-9-CM: 238.79 Splenomegaly     ICD-10-CM: R16.1 ICD-9-CM: 359. 2  Antineoplastic chemotherapy induced anemia     ICD-10-CM: D64.81, T45.1X5A 
 ICD-9-CM: 285.3, E933.1 Vitals BP Pulse Temp Height(growth percentile) Weight(growth percentile) BMI  
 116/72 (!) 43 98.2 °F (36.8 °C) 6' 8\" (2.032 m) 228 lb (103.4 kg) 25.05 kg/m2 Smoking Status Current Every Day Smoker BMI and BSA Data Body Mass Index Body Surface Area 25.05 kg/m 2 2.42 m 2 Preferred Pharmacy Pharmacy Name Phone [facility] [address] [facility] [facility] [address] [phone] Your Updated Medication List  
  
   
This list is accurate as of: 5/26/17 12:27 PM.  Always use your most recent med list.  
  
  
  
  
 allopurinol 100 mg tablet Commonly known as:  Rogelio Mings Take 3 Tabs by mouth daily. ferrous sulfate 325 mg (65 mg iron) tablet Take 1 Tab by mouth daily (with breakfast). HYDROcodone-acetaminophen 5-325 mg per tablet Commonly known as:  Simon Brittle Take 1 Tab by mouth every four (4) hours as needed for Pain. Max Daily Amount: 6 Tabs. imatinib 400 mg tablet Commonly known as:  GLEEVEC Take 1 tab po qd  
  
 indomethacin 50 mg capsule Commonly known as:  INDOCIN Take 1 Cap by mouth three (3) times daily (with meals) for 90 days. pseudoephedrine 30 mg tablet Commonly known as:  SUDAFED Take 1 Tab by mouth every four (4) hours as needed for Congestion. We Performed the Following COMPLETE CBC & AUTO DIFF WBC [18923 CPT(R)] Follow-up Instructions Return in about 2 months (around 7/26/2017). To-Do List   
 05/26/2017 Lab:  CBC WITH 3 PART DIFF   
  
 05/26/2017  1:00 PM  
  Appointment with [address] at [place] [address] ([phone])  06/09/2017 2:00 PM  
  Appointment with [address] at [facility] ([phone])  
  
 06/23/2017 2:00 PM  
  Appointment with [address] at [place] [address] ([phone])  
  
 07/07/2017 2:00 PM  
 Appointment with [address] [facility] [address] at [place] [address] ([phone]) Patient Instructions Chronic Myelogenous Leukemia: Care Instructions Your Care Instructions Leukemia is a type of cancer that causes your body to make too many blood cells, especially white blood cells. White blood cells are a part of your immune system, which helps protect you from infection and disease. In chronic myelogenous leukemia (CML), large numbers of white blood cells are made by the bone marrow. Over time, these cells may not work as they should, and they may cause symptoms as they begin to crowd out healthy white blood cells and other parts of your blood. But chronic leukemia gets worse slowly, and you may have few or no symptoms for months or years. It is often discovered during a routine blood test. 
There are many treatments for CML, including chemotherapy and targeted therapy. A healthy diet, exercise, extra rest, and a strong support system can help you feel better. Many people also find that getting counseling or joining a support group helps them cope with their illness. When you find out that you have cancer, you may feel many emotions and may need some help coping. Seek out family, friends, and counselors for support. You also can do things at home to make yourself feel better while you go through treatment. Call the [employer] (6-960.148.9598) or visit its website at sigmacare9 [employer] for more information. Follow-up care is a key part of your treatment and safety. Be sure to make and go to all appointments, and call your doctor if you are having problems. It's also a good idea to know your test results and keep a list of the medicines you take. How can you care for yourself at home? · Take your medicines exactly as prescribed. You may get medicine for nausea, vomiting, or pain (although leukemia rarely causes pain).  Call your doctor if you think you are having a problem with your medicine. You will get more details on the specific medicines your doctor prescribes. · Eat healthy food. If you do not feel like eating, try to eat food that has protein and extra calories to keep up your strength and prevent weight loss. Drink liquid meal replacements for extra calories and protein. Try to eat your main meal early. · Get some physical activity every day, but do not get too tired. Keep doing the hobbies you enjoy as your energy allows. · Take steps to control your stress and workload. Learn relaxation techniques. ¨ Share your feelings. Stress and tension affect our emotions. By expressing your feelings to others, you may be able to understand and cope with them. ¨ Consider joining a support group. Talking about a problem with your spouse, a good friend, or other people with similar problems is a good way to reduce tension and stress. ¨ Express yourself through art. Try writing, dance, art, or crafts to relieve tension. Some dance, writing, or art groups may be available just for people who have cancer. ¨ Be kind to your body and mind. Getting enough sleep, eating a nutritious diet, and taking time to do things you enjoy can contribute to an overall feeling of balance in your life and help reduce stress. ¨ Get help if you need it. Discuss your concerns with your doctor or counselor. · If you are vomiting or have diarrhea: ¨ Drink plenty of fluids (enough so that your urine is light yellow or clear like water) to prevent dehydration. Choose water and other caffeine-free clear liquids. If you have kidney, heart, or liver disease and have to limit fluids, talk with your doctor before you increase the amount of fluids you drink. ¨ When you are able to eat, try clear soups, mild foods, and liquids until all symptoms are gone for 12 to 48 hours.  Other good choices include dry toast, crackers, cooked cereal, and gelatin dessert, such as Jell-O. · Avoid colds and flu. Get a pneumococcal vaccine shot. If you have had one before, ask your doctor whether you need another dose. Get a flu shot every year. If you must be around people with colds or flu, wash your hands often. · Do not smoke. If you need help quitting, talk to your doctor about stop-smoking programs and medicines. These can increase your chances of quitting for good. When should you call for help? Call 911 anytime you think you may need emergency care. For example, call if: 
· You passed out (lost consciousness). · You vomit blood or what looks like coffee grounds. · You pass maroon or very bloody stools. · You have a fever that does not go away or goes higher. · You are very short of breath. Call your doctor now or seek immediate medical care if: 
· You have any unusual bleeding, such as: ¨ Blood spots under the skin. ¨ A nosebleed that you cannot stop. ¨ Bleeding gums when you brush your teeth. ¨ Blood in your urine. ¨ Vaginal bleeding when you are not having your period, or heavy period bleeding. · You are dizzy or lightheaded, or you feel like you may faint. · Your stools are black and tarlike or have streaks of blood. · You have signs of needing more fluids. You have sunken eyes and a dry mouth, and you pass only a little dark urine. · Vomiting has lasted longer than 24 hours and you are not able to keep fluids down. · You get a sudden, severe headache or stiff neck. · You have belly pain, or existing belly pain gets worse. · You have severe diarrhea (large, loose bowel movements every 1 to 2 hours). · You have joint pain or swelling. · You are confused or have trouble thinking clearly. Watch closely for changes in your health, and be sure to contact your doctor if: 
· You feel much more tired than usual. 
· You have weakness that is getting worse. · You feel sad or anxious for a long period of time, or your feelings interfere with your normal activities or relationships. Where can you learn more? Go to http://gayle-shai.info/. Enter C276 in the search box to learn more about \"Chronic Myelogenous Leukemia: Care Instructions. \" Current as of: July 26, 2016 Content Version: 11.2 © 0823-2762 [employer]. Care instructions adapted under license by [employer] (which disclaims liability or warranty for this information). If you have questions about a medical condition or this instruction, always ask your healthcare professional. [place] [address] any warranty or liability for your use of this information. Please provide this summary of care documentation to your next provider. Your primary care clinician is listed as [address] [place] [address]. If you have any questions after today's visit, please call [phone].

## 2017-05-26 NOTE — PATIENT INSTRUCTIONS
Chronic Myelogenous Leukemia: Care Instructions  Your Care Instructions  Leukemia is a type of cancer that causes your body to make too many blood cells, especially white blood cells. White blood cells are a part of your immune system, which helps protect you from infection and disease. In chronic myelogenous leukemia (CML), large numbers of white blood cells are made by the bone marrow. Over time, these cells may not work as they should, and they may cause symptoms as they begin to crowd out healthy white blood cells and other parts of your blood. But chronic leukemia gets worse slowly, and you may have few or no symptoms for months or years. It is often discovered during a routine blood test.  There are many treatments for CML, including chemotherapy and targeted therapy. A healthy diet, exercise, extra rest, and a strong support system can help you feel better. Many people also find that getting counseling or joining a support group helps them cope with their illness. When you find out that you have cancer, you may feel many emotions and may need some help coping. Seek out family, friends, and counselors for support. You also can do things at home to make yourself feel better while you go through treatment. Call the [employer] (1-411.729.8206) or visit its website at [address] [employer]. Amiato for more information. Follow-up care is a key part of your treatment and safety. Be sure to make and go to all appointments, and call your doctor if you are having problems. It's also a good idea to know your test results and keep a list of the medicines you take. How can you care for yourself at home? · Take your medicines exactly as prescribed. You may get medicine for nausea, vomiting, or pain (although leukemia rarely causes pain). Call your doctor if you think you are having a problem with your medicine. You will get more details on the specific medicines your doctor prescribes. · Eat healthy food.  If you do not feel like eating, try to eat food that has protein and extra calories to keep up your strength and prevent weight loss. Drink liquid meal replacements for extra calories and protein. Try to eat your main meal early. · Get some physical activity every day, but do not get too tired. Keep doing the hobbies you enjoy as your energy allows. · Take steps to control your stress and workload. Learn relaxation techniques. ¨ Share your feelings. Stress and tension affect our emotions. By expressing your feelings to others, you may be able to understand and cope with them. ¨ Consider joining a support group. Talking about a problem with your spouse, a good friend, or other people with similar problems is a good way to reduce tension and stress. ¨ Express yourself through art. Try writing, dance, art, or crafts to relieve tension. Some dance, writing, or art groups may be available just for people who have cancer. ¨ Be kind to your body and mind. Getting enough sleep, eating a nutritious diet, and taking time to do things you enjoy can contribute to an overall feeling of balance in your life and help reduce stress. ¨ Get help if you need it. Discuss your concerns with your doctor or counselor. · If you are vomiting or have diarrhea:  ¨ Drink plenty of fluids (enough so that your urine is light yellow or clear like water) to prevent dehydration. Choose water and other caffeine-free clear liquids. If you have kidney, heart, or liver disease and have to limit fluids, talk with your doctor before you increase the amount of fluids you drink. ¨ When you are able to eat, try clear soups, mild foods, and liquids until all symptoms are gone for 12 to 48 hours. Other good choices include dry toast, crackers, cooked cereal, and gelatin dessert, such as Jell-O.  · Avoid colds and flu. Get a pneumococcal vaccine shot. If you have had one before, ask your doctor whether you need another dose. Get a flu shot every year.  If you must be around people with colds or flu, wash your hands often. · Do not smoke. If you need help quitting, talk to your doctor about stop-smoking programs and medicines. These can increase your chances of quitting for good. When should you call for help? Call 911 anytime you think you may need emergency care. For example, call if:  · You passed out (lost consciousness). · You vomit blood or what looks like coffee grounds. · You pass maroon or very bloody stools. · You have a fever that does not go away or goes higher. · You are very short of breath. Call your doctor now or seek immediate medical care if:  · You have any unusual bleeding, such as:  ¨ Blood spots under the skin. ¨ A nosebleed that you cannot stop. ¨ Bleeding gums when you brush your teeth. ¨ Blood in your urine. ¨ Vaginal bleeding when you are not having your period, or heavy period bleeding. · You are dizzy or lightheaded, or you feel like you may faint. · Your stools are black and tarlike or have streaks of blood. · You have signs of needing more fluids. You have sunken eyes and a dry mouth, and you pass only a little dark urine. · Vomiting has lasted longer than 24 hours and you are not able to keep fluids down. · You get a sudden, severe headache or stiff neck. · You have belly pain, or existing belly pain gets worse. · You have severe diarrhea (large, loose bowel movements every 1 to 2 hours). · You have joint pain or swelling. · You are confused or have trouble thinking clearly. Watch closely for changes in your health, and be sure to contact your doctor if:  · You feel much more tired than usual.  · You have weakness that is getting worse. · You feel sad or anxious for a long period of time, or your feelings interfere with your normal activities or relationships. Where can you learn more? Go to http://gayle-shai.info/.   Enter C292 in the search box to learn more about \"Chronic Myelogenous Leukemia: Care Instructions. \"  Current as of: July 26, 2016  Content Version: 11.2  © 9801-9385 [employer], [employer]. Care instructions adapted under license by [employer] (which disclaims liability or warranty for this information). If you have questions about a medical condition or this instruction, always ask your healthcare professional. [place] [address] any warranty or liability for your use of this information.

## 2017-05-27 LAB
A-G RATIO,AGRAT: 1.4 RATIO (ref 1.1–2.6)
ALBUMIN SERPL-MCNC: 3.8 G/DL (ref 3.5–5)
ALP SERPL-CCNC: 113 U/L (ref 25–115)
ALT SERPL-CCNC: 14 U/L (ref 5–40)
ANION GAP SERPL CALC-SCNC: 11 MMOL/L
AST SERPL W P-5'-P-CCNC: 15 U/L (ref 10–37)
BILIRUB SERPL-MCNC: 0.3 MG/DL (ref 0.2–1.2)
BUN SERPL-MCNC: 12 MG/DL (ref 6–22)
CALCIUM SERPL-MCNC: 8.5 MG/DL (ref 8.4–10.4)
CHLORIDE SERPL-SCNC: 102 MMOL/L (ref 98–110)
CO2 SERPL-SCNC: 27 MMOL/L (ref 20–32)
CREAT SERPL-MCNC: 1.1 MG/DL (ref 0.5–1.2)
FE % SATURATION,PSAT: 21 % (ref 20–50)
FERRITIN SERPL-MCNC: 109 NG/ML (ref 22–322)
GFRAA, 66117: >60
GFRNA, 66118: >60
GLOBULIN,GLOB: 2.7 G/DL (ref 2–4)
GLUCOSE SERPL-MCNC: 92 MG/DL (ref 65–99)
IRON,IRN: 48 MCG/DL (ref 45–160)
POTASSIUM SERPL-SCNC: 3.7 MMOL/L (ref 3.5–5.5)
PROT SERPL-MCNC: 6.5 G/DL (ref 6.4–8.3)
SODIUM SERPL-SCNC: 140 MMOL/L (ref 133–145)
TIBC,TIBC: 228 MCG/DL (ref 228–428)
UIBC SERPL-MCNC: 180 MCG/DL (ref 110–370)

## 2017-05-31 LAB
B2B2 TRANSCRIPT: 19.28 %
B3B2 TRANSCRIPT: 6.16 %
BACKGROUND BCR-ABL: ABNORMAL
DIRECTOR REVIEW BCR-ABL: ABNORMAL
E1A2 TRANSCRIPT 480502: 0.02 %
INTERPRETATION BCR-ABL: ABNORMAL
METHODOLOGY BCR-ABL1: ABNORMAL

## 2017-06-09 ENCOUNTER — HOSPITAL ENCOUNTER (OUTPATIENT)
Dept: INFUSION THERAPY | Age: 42
Discharge: HOME | End: 2017-06-09
Payer: COMMERCIAL

## 2017-06-09 ENCOUNTER — HOSPITAL ENCOUNTER (OUTPATIENT)
Dept: ONCOLOGY | Age: 42
Discharge: HOME | End: 2017-06-09

## 2017-06-09 ENCOUNTER — CLINICAL SUPPORT (OUTPATIENT)
Dept: ONCOLOGY | Age: 42
End: 2017-06-09

## 2017-06-09 VITALS
DIASTOLIC BLOOD PRESSURE: 80 MMHG | TEMPERATURE: 97.4 F | OXYGEN SATURATION: 99 % | SYSTOLIC BLOOD PRESSURE: 123 MMHG | RESPIRATION RATE: 18 BRPM | HEART RATE: 68 BPM

## 2017-06-09 DIAGNOSIS — D64.9 ANEMIA, UNSPECIFIED: Primary | ICD-10-CM

## 2017-06-09 DIAGNOSIS — D64.9 ANEMIA, UNSPECIFIED: ICD-10-CM

## 2017-06-09 LAB
BASO+EOS+MONOS # BLD AUTO: 0.4 K/UL (ref 0–2.3)
BASO+EOS+MONOS # BLD AUTO: 4 % (ref 0.1–17)
DIFFERENTIAL METHOD BLD: ABNORMAL
ERYTHROCYTE [DISTWIDTH] IN BLOOD BY AUTOMATED COUNT: 16.4 % (ref 11.5–14.5)
HCT VFR BLD AUTO: 33.4 % (ref 36–48)
HGB BLD-MCNC: 10.8 G/DL (ref 12–16)
LYMPHOCYTES # BLD AUTO: 19 % (ref 14–44)
LYMPHOCYTES # BLD: 2 K/UL (ref 1.1–5.9)
MCH RBC QN AUTO: 28.1 PG (ref 25–35)
MCHC RBC AUTO-ENTMCNC: 32.3 G/DL (ref 31–37)
MCV RBC AUTO: 86.8 FL (ref 78–102)
NEUTS SEG # BLD: 8 K/UL (ref 1.8–9.5)
NEUTS SEG NFR BLD AUTO: 77 % (ref 40–70)
PLATELET # BLD AUTO: 168 K/UL (ref 140–440)
RBC # BLD AUTO: 3.85 M/UL (ref 4.1–5.1)
WBC # BLD AUTO: 10.4 K/UL (ref 4.5–13)

## 2017-06-09 PROCEDURE — 36415 COLL VENOUS BLD VENIPUNCTURE: CPT

## 2017-06-23 ENCOUNTER — HOSPITAL ENCOUNTER (OUTPATIENT)
Dept: INFUSION THERAPY | Age: 42
Discharge: HOME | End: 2017-06-23
Payer: COMMERCIAL

## 2017-06-23 ENCOUNTER — CLINICAL SUPPORT (OUTPATIENT)
Dept: ONCOLOGY | Age: 42
End: 2017-06-23

## 2017-06-23 ENCOUNTER — HOSPITAL ENCOUNTER (OUTPATIENT)
Dept: ONCOLOGY | Age: 42
Discharge: HOME | End: 2017-06-23

## 2017-06-23 VITALS
DIASTOLIC BLOOD PRESSURE: 76 MMHG | TEMPERATURE: 98.7 F | HEART RATE: 57 BPM | SYSTOLIC BLOOD PRESSURE: 117 MMHG | RESPIRATION RATE: 16 BRPM

## 2017-06-23 DIAGNOSIS — D64.9 ANEMIA, UNSPECIFIED: ICD-10-CM

## 2017-06-23 DIAGNOSIS — D64.9 ANEMIA, UNSPECIFIED: Primary | ICD-10-CM

## 2017-06-23 LAB
BASO+EOS+MONOS # BLD AUTO: 0.4 K/UL (ref 0–2.3)
BASO+EOS+MONOS # BLD AUTO: 4 % (ref 0.1–17)
DIFFERENTIAL METHOD BLD: ABNORMAL
ERYTHROCYTE [DISTWIDTH] IN BLOOD BY AUTOMATED COUNT: 16.1 % (ref 11.5–14.5)
HCT VFR BLD AUTO: 38.3 % (ref 36–48)
HGB BLD-MCNC: 12.5 G/DL (ref 12–16)
LYMPHOCYTES # BLD AUTO: 24 % (ref 14–44)
LYMPHOCYTES # BLD: 2.6 K/UL (ref 1.1–5.9)
MCH RBC QN AUTO: 27.8 PG (ref 25–35)
MCHC RBC AUTO-ENTMCNC: 32.6 G/DL (ref 31–37)
MCV RBC AUTO: 85.3 FL (ref 78–102)
NEUTS SEG # BLD: 7.8 K/UL (ref 1.8–9.5)
NEUTS SEG NFR BLD AUTO: 72 % (ref 40–70)
PLATELET # BLD AUTO: 161 K/UL (ref 135–420)
RBC # BLD AUTO: 4.49 M/UL (ref 4.1–5.1)
WBC # BLD AUTO: 10.8 K/UL (ref 4.5–13)

## 2017-06-23 PROCEDURE — 36415 COLL VENOUS BLD VENIPUNCTURE: CPT

## 2017-06-23 NOTE — PROGRESS NOTES
[address] [facility] Progress Note    Date: 2017    Name: Shaquille Wilkerson    MRN: [id number]         : 1975      Mr. Mcneil arrived in the [facility] today, at 1410, in stable condition, here for Q 2 Week, CBC/Procrit injection. He was assessed and education was provided. Mr. Cinthya Traylor vitals were reviewed. Visit Vitals    /76 (BP 1 Location: Left arm, BP Patient Position: At rest;Sitting)    Pulse (!) 57    Temp 98.7 °F (37.1 °C)    Resp 16           CBC was drawn from his left AC, at 1416, per order, and without incident. Lab results were obtained and reviewed, and the preliminary platelet count was noted to be 151,000. Recent Results (from the past 12 hour(s))   CBC WITH 3 PART DIFF    Collection Time: 17  2:16 PM   Result Value Ref Range    WBC 10.8 4.5 - 13.0 K/uL    RBC 4.49 4. 10 - 5.10 M/uL    HGB 12.5 12.0 - 16.0 g/dL    HCT 38.3 36 - 48 %    MCV 85.3 78 - 102 FL    MCH 27.8 25.0 - 35.0 PG    MCHC 32.6 31 - 37 g/dL    RDW 16.1 (H) 11.5 - 14.5 %    NEUTROPHILS 72 (H) 40 - 70 %    MIXED CELLS 4 0.1 - 17 %    LYMPHOCYTES 24 14 - 44 %    ABS. NEUTROPHILS 7.8 1.8 - 9.5 K/UL    ABS. MIXED CELLS 0.4 0.0 - 2.3 K/uL    ABS. LYMPHOCYTES 2.6 1.1 - 5.9 K/UL    DF AUTOMATED               Procrit injection was HELD today, per order. Mr. Ascencion Pérez tolerated well, and had no complaints. Mr. Ascencion Pérez was discharged from [place] [address] in stable condition at 1430. Earnest Bo He is to return in 2 weeks, on Friday, 17, at 1400,  for his next appointment, for CBC/Procrit injection.      Gonzalo Pisano RN  2017  2:25 PM

## 2017-07-07 ENCOUNTER — CLINICAL SUPPORT (OUTPATIENT)
Dept: ONCOLOGY | Age: 42
End: 2017-07-07

## 2017-07-07 ENCOUNTER — HOSPITAL ENCOUNTER (OUTPATIENT)
Dept: ONCOLOGY | Age: 42
Discharge: HOME | End: 2017-07-07

## 2017-07-07 ENCOUNTER — HOSPITAL ENCOUNTER (OUTPATIENT)
Dept: INFUSION THERAPY | Age: 42
Discharge: HOME | End: 2017-07-07
Payer: COMMERCIAL

## 2017-07-07 VITALS
DIASTOLIC BLOOD PRESSURE: 73 MMHG | TEMPERATURE: 98 F | SYSTOLIC BLOOD PRESSURE: 117 MMHG | HEART RATE: 73 BPM | RESPIRATION RATE: 16 BRPM

## 2017-07-07 DIAGNOSIS — D64.9 ANEMIA, UNSPECIFIED TYPE: ICD-10-CM

## 2017-07-07 DIAGNOSIS — D64.9 ANEMIA, UNSPECIFIED TYPE: Primary | ICD-10-CM

## 2017-07-07 LAB
BASO+EOS+MONOS # BLD AUTO: 0.5 K/UL (ref 0–2.3)
BASO+EOS+MONOS # BLD AUTO: 8 % (ref 0.1–17)
DIFFERENTIAL METHOD BLD: ABNORMAL
ERYTHROCYTE [DISTWIDTH] IN BLOOD BY AUTOMATED COUNT: 16.1 % (ref 11.5–14.5)
HCT VFR BLD AUTO: 37 % (ref 36–48)
HGB BLD-MCNC: 12 G/DL (ref 12–16)
LYMPHOCYTES # BLD AUTO: 31 % (ref 14–44)
LYMPHOCYTES # BLD: 2 K/UL (ref 1.1–5.9)
MCH RBC QN AUTO: 27.3 PG (ref 25–35)
MCHC RBC AUTO-ENTMCNC: 32.4 G/DL (ref 31–37)
MCV RBC AUTO: 84.1 FL (ref 78–102)
NEUTS SEG # BLD: 3.9 K/UL (ref 1.8–9.5)
NEUTS SEG NFR BLD AUTO: 61 % (ref 40–70)
PLATELET # BLD AUTO: 105 K/UL (ref 140–440)
RBC # BLD AUTO: 4.4 M/UL (ref 4.1–5.1)
WBC # BLD AUTO: 6.4 K/UL (ref 4.5–13)

## 2017-07-07 PROCEDURE — 36415 COLL VENOUS BLD VENIPUNCTURE: CPT

## 2017-07-07 NOTE — PROGRESS NOTES
[facility] Progress Note    Date: 2017    Name: Poncho Perez    MRN: [id number]         : 1975      Mr. Posada N Ih Sanna was assessed and education was provided. Mr. Soraya Sarmiento vitals were reviewed and patient was observed for 5 minutes prior to treatment. Visit Vitals    /73 (BP 1 Location: Left arm, BP Patient Position: At rest;Sitting)    Pulse 73    Temp 98 °F (36.7 °C)    Resp 16       Lab results were obtained and reviewed. Recent Results (from the past 12 hour(s))   CBC WITH 3 PART DIFF    Collection Time: 17  1:52 PM   Result Value Ref Range    WBC 6.4 4.5 - 13.0 K/uL    RBC 4.40 4. 10 - 5.10 M/uL    HGB 12.0 12.0 - 16.0 g/dL    HCT 37.0 36 - 48 %    MCV 84.1 78 - 102 FL    MCH 27.3 25.0 - 35.0 PG    MCHC 32.4 31 - 37 g/dL    RDW 16.1 (H) 11.5 - 14.5 %    PLATELET 524 (L) 886 - 440 K/uL    NEUTROPHILS 61 40 - 70 %    MIXED CELLS 8 0.1 - 17 %    LYMPHOCYTES 31 14 - 44 %    ABS. NEUTROPHILS 3.9 1.8 - 9.5 K/UL    ABS. MIXED CELLS 0.5 0.0 - 2.3 K/uL    ABS. LYMPHOCYTES 2.0 1.1 - 5.9 K/UL    DF AUTOMATED         Procrit held for H&H 12.0/37.0. Patient armband removed and shredded. . Swetha N Ih Sanna was discharged from [place] [address] in stable condition at 1410. He is to return on 17 at 1500 for his next appointment for CBC/Procrit Q 2 weeks.     Queen Ronny RN  2017  2:09 PM

## 2017-07-21 ENCOUNTER — HOSPITAL ENCOUNTER (OUTPATIENT)
Dept: ONCOLOGY | Age: 42
Discharge: HOME | End: 2017-07-21

## 2017-07-21 ENCOUNTER — HOSPITAL ENCOUNTER (OUTPATIENT)
Dept: INFUSION THERAPY | Age: 42
Discharge: HOME | End: 2017-07-21
Payer: COMMERCIAL

## 2017-07-21 ENCOUNTER — CLINICAL SUPPORT (OUTPATIENT)
Dept: ONCOLOGY | Age: 42
End: 2017-07-21

## 2017-07-21 VITALS
SYSTOLIC BLOOD PRESSURE: 124 MMHG | RESPIRATION RATE: 16 BRPM | HEART RATE: 62 BPM | TEMPERATURE: 98.3 F | DIASTOLIC BLOOD PRESSURE: 83 MMHG

## 2017-07-21 DIAGNOSIS — D64.9 ANEMIA, UNSPECIFIED TYPE: Primary | ICD-10-CM

## 2017-07-21 DIAGNOSIS — D64.9 ANEMIA, UNSPECIFIED TYPE: ICD-10-CM

## 2017-07-21 LAB
BASO+EOS+MONOS # BLD AUTO: 0.5 K/UL (ref 0–2.3)
BASO+EOS+MONOS # BLD AUTO: 7 % (ref 0.1–17)
DIFFERENTIAL METHOD BLD: ABNORMAL
ERYTHROCYTE [DISTWIDTH] IN BLOOD BY AUTOMATED COUNT: 16 % (ref 11.5–14.5)
HCT VFR BLD AUTO: 38.9 % (ref 36–48)
HGB BLD-MCNC: 12.7 G/DL (ref 12–16)
LYMPHOCYTES # BLD AUTO: 28 % (ref 14–44)
LYMPHOCYTES # BLD: 2.3 K/UL (ref 1.1–5.9)
MCH RBC QN AUTO: 27.5 PG (ref 25–35)
MCHC RBC AUTO-ENTMCNC: 32.6 G/DL (ref 31–37)
MCV RBC AUTO: 84.2 FL (ref 78–102)
NEUTS SEG # BLD: 5.3 K/UL (ref 1.8–9.5)
NEUTS SEG NFR BLD AUTO: 65 % (ref 40–70)
PLATELET # BLD AUTO: 110 K/UL (ref 135–420)
RBC # BLD AUTO: 4.62 M/UL (ref 4.1–5.1)
WBC # BLD AUTO: 8.1 K/UL (ref 4.5–13)

## 2017-07-21 PROCEDURE — 36415 COLL VENOUS BLD VENIPUNCTURE: CPT

## 2017-07-21 NOTE — PROGRESS NOTES
[facility] Progress Note    Date: 2017    Name: Caitlin Hurd    MRN: [id number]         : 1975    Mr. Man Leon was assessed and education was provided. Mr. Amber Hanely vitals were reviewed. Visit Vitals    /83 (BP 1 Location: Left arm, BP Patient Position: At rest;Sitting)    Pulse 62    Temp 98.3 °F (36.8 °C)    Resp 16       Lab results were obtained and reviewed. Recent Results (from the past 12 hour(s))   CBC WITH 3 PART DIFF    Collection Time: 17  2:27 PM   Result Value Ref Range    WBC 8.1 4.5 - 13.0 K/uL    RBC 4.62 4.10 - 5.10 M/uL    HGB 12.7 12.0 - 16.0 g/dL    HCT 38.9 36 - 48 %    MCV 84.2 78 - 102 FL    MCH 27.5 25.0 - 35.0 PG    MCHC 32.6 31 - 37 g/dL    RDW 16.0 (H) 11.5 - 14.5 %    NEUTROPHILS 65 40 - 70 %    MIXED CELLS 7 0.1 - 17 %    LYMPHOCYTES 28 14 - 44 %    ABS. NEUTROPHILS 5.3 1.8 - 9.5 K/UL    ABS. MIXED CELLS 0.5 0.0 - 2.3 K/uL    ABS. LYMPHOCYTES 2.3 1.1 - 5.9 K/UL    DF AUTOMATED           Procrit held for H&H 12.7/38.9. Patient armband removed and shredded. Mr. Man Leon was discharged from [place] [address] in stable condition at 1455. He is to return on 17 at 1400 for his next appointment.     Johanna Douglass RN  2017  3:04 PM

## 2017-07-25 ENCOUNTER — OFFICE VISIT (OUTPATIENT)
Dept: ONCOLOGY | Age: 42
End: 2017-07-25

## 2017-07-25 ENCOUNTER — HOSPITAL ENCOUNTER (OUTPATIENT)
Dept: ONCOLOGY | Age: 42
Discharge: HOME | End: 2017-07-25

## 2017-07-25 VITALS
TEMPERATURE: 98.2 F | HEIGHT: 78 IN | DIASTOLIC BLOOD PRESSURE: 82 MMHG | BODY MASS INDEX: 26.38 KG/M2 | SYSTOLIC BLOOD PRESSURE: 114 MMHG | WEIGHT: 228 LBS | HEART RATE: 72 BPM

## 2017-07-25 DIAGNOSIS — D47.1 MYELOPROLIFERATIVE DISORDER (HCC): ICD-10-CM

## 2017-07-25 DIAGNOSIS — T45.1X5A ANTINEOPLASTIC CHEMOTHERAPY INDUCED ANEMIA: ICD-10-CM

## 2017-07-25 DIAGNOSIS — D64.81 ANTINEOPLASTIC CHEMOTHERAPY INDUCED ANEMIA: ICD-10-CM

## 2017-07-25 DIAGNOSIS — C92.10 CML (CHRONIC MYELOCYTIC LEUKEMIA) (HCC): ICD-10-CM

## 2017-07-25 DIAGNOSIS — C92.10 CML (CHRONIC MYELOCYTIC LEUKEMIA) (HCC): Primary | ICD-10-CM

## 2017-07-25 LAB
BASO+EOS+MONOS # BLD AUTO: 0.5 K/UL (ref 0–2.3)
BASO+EOS+MONOS # BLD AUTO: 7 % (ref 0.1–17)
DIFFERENTIAL METHOD BLD: ABNORMAL
ERYTHROCYTE [DISTWIDTH] IN BLOOD BY AUTOMATED COUNT: 16.5 % (ref 11.5–14.5)
HCT VFR BLD AUTO: 39.8 % (ref 36–48)
HGB BLD-MCNC: 12.8 G/DL (ref 12–16)
LYMPHOCYTES # BLD AUTO: 30 % (ref 14–44)
LYMPHOCYTES # BLD: 2 K/UL (ref 1.1–5.9)
MCH RBC QN AUTO: 27 PG (ref 25–35)
MCHC RBC AUTO-ENTMCNC: 32.2 G/DL (ref 31–37)
MCV RBC AUTO: 84 FL (ref 78–102)
NEUTS SEG # BLD: 4.2 K/UL (ref 1.8–9.5)
NEUTS SEG NFR BLD AUTO: 63 % (ref 40–70)
PLATELET # BLD AUTO: 118 K/UL (ref 140–440)
RBC # BLD AUTO: 4.74 M/UL (ref 4.1–5.1)
WBC # BLD AUTO: 6.7 K/UL (ref 4.5–13)

## 2017-07-25 NOTE — MR AVS SNAPSHOT
Visit Information Date & Time Provider Department Dept. Phone Encounter #  
 7/25/2017  2:00 PM Kelsea Shell [facility] 70 Office [phone] [id number] Follow-up Instructions Return in about 2 months (around 9/25/2017). Your Appointments 9/26/2017  2:30 PM  
Office Visit with MD Brionna Gleason 76 [facility] [facility]) Appt Note: OV  
 [facility] 99[address] [facility] Merg[facility] with [facility] [address]  
[phone]  
  
   
 [facility] [address] [place] Upcoming Health Maintenance Date Due Pneumococcal 19-64 Highest Risk (1 of 3 - PCV13) 10/4/1993 DTaP/Tdap/Td series (1 - Tdap) 10/4/1995 INFLUENZA AGE 9 TO ADULT 8/1/2017 Allergies as of 7/25/2017  Review Complete On: 7/21/2017 By: Cuco Hale RN No Known Allergies Current Immunizations  Reviewed on 7/21/2017 No immunizations on file. Not reviewed this visit You Were Diagnosed With   
  
 Codes Comments CML (chronic myelocytic leukemia) ([facility].)    -  Primary ICD-10-CM: C92.10 ICD-9-CM: 205.10 Myeloproliferative disorder ([facility] 75.)     ICD-10-CM: D47.1 ICD-9-CM: 238.79 Antineoplastic chemotherapy induced anemia     ICD-10-CM: D64.81, T45.1X5A 
ICD-9-CM: 285.3, E933.1 Vitals Smoking Status Current Every Day Smoker Preferred Pharmacy Pharmacy Name Phone [employer] [facility], [address] [place] [address] [phone] Your Updated Medication List  
  
   
This list is accurate as of: 7/25/17  2:51 PM.  Always use your most recent med list.  
  
  
  
  
 allopurinol 100 mg tablet Commonly known as:  Hernandez Corey Take 3 Tabs by mouth daily. ferrous sulfate 325 mg (65 mg iron) tablet Take 1 Tab by mouth daily (with breakfast). HYDROcodone-acetaminophen 5-325 mg per tablet Commonly known as:  Marvelyn Code Take 1 Tab by mouth every four (4) hours as needed for Pain.  Max Daily Amount: 6 Tabs. imatinib 400 mg tablet Commonly known as:  GLEEVEC Take 1 tab po qd  
  
 pseudoephedrine 30 mg tablet Commonly known as:  SUDAFED Take 1 Tab by mouth every four (4) hours as needed for Congestion. We Performed the Following BCR-ABL1, PCR, QT [[id number] Custom] COMPLETE CBC & AUTO DIFF WBC [65572 CPT(R)] FERRITIN [73764 CPT(R)] IRON PROFILE [id number] CPT(R)] METABOLIC PANEL, COMPREHENSIVE [85321 CPT(R)] Follow-up Instructions Return in about 2 months (around 9/25/2017). To-Do List   
 07/25/2017 Lab:  BCR-ABL1, PCR, QT   
  
 07/25/2017 Lab:  CBC WITH 3 PART DIFF   
  
 07/25/2017 Lab:  FERRITIN   
  
 07/25/2017 Lab:  IRON PROFILE   
  
 07/25/2017 Lab:  METABOLIC PANEL, COMPREHENSIVE   
  
 08/04/2017 2:00 PM  
  Appointment with [address] at [place] [address] ([phone])  
  
 08/18/2017 1:00 PM  
  Appointment with [address] at [place] [address] ([phone]) 09/01/2017 2:00 PM  
  Appointment with [address] at [place] [address] ([phone])  
  
 09/15/2017 2:00 PM  
  Appointment with [address] at [place] [address] ([phone])  
  
 09/29/2017 2:00 PM  
  Appointment with [address] at [place] [address] ([phone]) Patient Instructions Chronic Myelogenous Leukemia: Care Instructions Your Care Instructions Leukemia is a type of cancer that causes your body to make too many blood cells, especially white blood cells. White blood cells are a part of your immune system, which helps protect you from infection and disease. In chronic myelogenous leukemia (CML), large numbers of white blood cells are made by the bone marrow.  Over time, these cells may not work as they should, and they may cause symptoms as they begin to crowd out healthy white blood cells and other parts of your blood. But chronic leukemia gets worse slowly, and you may have few or no symptoms for months or years. It is often discovered during a routine blood test. 
There are many treatments for CML, including chemotherapy and targeted therapy. A healthy diet, exercise, extra rest, and a strong support system can help you feel better. Many people also find that getting counseling or joining a support group helps them cope with their illness. When you find out that you have cancer, you may feel many emotions and may need some help coping. Seek out family, friends, and counselors for support. You also can do things at home to make yourself feel better while you go through treatment. Call the [employer] ([phone]) or visit its website at [employer] for more information. Follow-up care is a key part of your treatment and safety. Be sure to make and go to all appointments, and call your doctor if you are having problems. It's also a good idea to know your test results and keep a list of the medicines you take. How can you care for yourself at home? · Take your medicines exactly as prescribed. You may get medicine for nausea, vomiting, or pain (although leukemia rarely causes pain). Call your doctor if you think you are having a problem with your medicine. You will get more details on the specific medicines your doctor prescribes. · Eat healthy food. If you do not feel like eating, try to eat food that has protein and extra calories to keep up your strength and prevent weight loss. Drink liquid meal replacements for extra calories and protein. Try to eat your main meal early. · Get some physical activity every day, but do not get too tired. Keep doing the hobbies you enjoy as your energy allows. · Take steps to control your stress and workload. Learn relaxation techniques. ¨ Share your feelings. Stress and tension affect our emotions.  By expressing your feelings to others, you may be able to understand and cope with them. ¨ Consider joining a support group. Talking about a problem with your spouse, a good friend, or other people with similar problems is a good way to reduce tension and stress. ¨ Express yourself through art. Try writing, dance, art, or crafts to relieve tension. Some dance, writing, or art groups may be available just for people who have cancer. ¨ Be kind to your body and mind. Getting enough sleep, eating a nutritious diet, and taking time to do things you enjoy can contribute to an overall feeling of balance in your life and help reduce stress. ¨ Get help if you need it. Discuss your concerns with your doctor or counselor. · If you are vomiting or have diarrhea: ¨ Drink plenty of fluids (enough so that your urine is light yellow or clear like water) to prevent dehydration. Choose water and other caffeine-free clear liquids. If you have kidney, heart, or liver disease and have to limit fluids, talk with your doctor before you increase the amount of fluids you drink. ¨ When you are able to eat, try clear soups, mild foods, and liquids until all symptoms are gone for 12 to 48 hours. Other good choices include dry toast, crackers, cooked cereal, and gelatin dessert, such as Jell-O. · Avoid colds and flu. Get a pneumococcal vaccine shot. If you have had one before, ask your doctor whether you need another dose. Get a flu shot every year. If you must be around people with colds or flu, wash your hands often. · Do not smoke. If you need help quitting, talk to your doctor about stop-smoking programs and medicines. These can increase your chances of quitting for good. When should you call for help? Call 911 anytime you think you may need emergency care. For example, call if: 
· You passed out (lost consciousness). · You vomit blood or what looks like coffee grounds. · You pass maroon or very bloody stools. · You have a fever that does not go away or goes higher. · You are very short of breath. Call your doctor now or seek immediate medical care if: 
· You have any unusual bleeding, such as: ¨ Blood spots under the skin. ¨ A nosebleed that you cannot stop. ¨ Bleeding gums when you brush your teeth. ¨ Blood in your urine. ¨ Vaginal bleeding when you are not having your period, or heavy period bleeding. · You are dizzy or lightheaded, or you feel like you may faint. · Your stools are black and tarlike or have streaks of blood. · You have signs of needing more fluids. You have sunken eyes and a dry mouth, and you pass only a little dark urine. · Vomiting has lasted longer than 24 hours and you are not able to keep fluids down. · You get a sudden, severe headache or stiff neck. · You have belly pain, or existing belly pain gets worse. · You have severe diarrhea (large, loose bowel movements every 1 to 2 hours). · You have joint pain or swelling. · You are confused or have trouble thinking clearly. Watch closely for changes in your health, and be sure to contact your doctor if: 
· You feel much more tired than usual. 
· You have weakness that is getting worse. · You feel sad or anxious for a long period of time, or your feelings interfere with your normal activities or relationships. Where can you learn more? Go to http://gayle-shai.info/. Enter C276 in the search box to learn more about \"Chronic Myelogenous Leukemia: Care Instructions. \" Current as of: July 26, 2016 Content Version: 11.3 © 7003-0402 [employer]. Care instructions adapted under license by [employer] (which disclaims liability or warranty for this information). If you have questions about a medical condition or this instruction, always ask your healthcare professional. [address] any warranty or liability for your use of this information. Please provide this summary of care documentation to your next provider. Your primary care clinician is listed as Chris Marie. If you have any questions after today's visit, please call [phone].

## 2017-07-25 NOTE — PATIENT INSTRUCTIONS
Chronic Myelogenous Leukemia: Care Instructions  Your Care Instructions  Leukemia is a type of cancer that causes your body to make too many blood cells, especially white blood cells. White blood cells are a part of your immune system, which helps protect you from infection and disease. In chronic myelogenous leukemia (CML), large numbers of white blood cells are made by the bone marrow. Over time, these cells may not work as they should, and they may cause symptoms as they begin to crowd out healthy white blood cells and other parts of your blood. But chronic leukemia gets worse slowly, and you may have few or no symptoms for months or years. It is often discovered during a routine blood test.  There are many treatments for CML, including chemotherapy and targeted therapy. A healthy diet, exercise, extra rest, and a strong support system can help you feel better. Many people also find that getting counseling or joining a support group helps them cope with their illness. When you find out that you have cancer, you may feel many emotions and may need some help coping. Seek out family, friends, and counselors for support. You also can do things at home to make yourself feel better while you go through treatment. Call the [employer] ([phone]) or visit its website at [employer] for more information. Follow-up care is a key part of your treatment and safety. Be sure to make and go to all appointments, and call your doctor if you are having problems. It's also a good idea to know your test results and keep a list of the medicines you take. How can you care for yourself at home? · Take your medicines exactly as prescribed. You may get medicine for nausea, vomiting, or pain (although leukemia rarely causes pain). Call your doctor if you think you are having a problem with your medicine. You will get more details on the specific medicines your doctor prescribes. · Eat healthy food.  If you do not feel like eating, try to eat food that has protein and extra calories to keep up your strength and prevent weight loss. Drink liquid meal replacements for extra calories and protein. Try to eat your main meal early. · Get some physical activity every day, but do not get too tired. Keep doing the hobbies you enjoy as your energy allows. · Take steps to control your stress and workload. Learn relaxation techniques. ¨ Share your feelings. Stress and tension affect our emotions. By expressing your feelings to others, you may be able to understand and cope with them. ¨ Consider joining a support group. Talking about a problem with your spouse, a good friend, or other people with similar problems is a good way to reduce tension and stress. ¨ Express yourself through art. Try writing, dance, art, or crafts to relieve tension. Some dance, writing, or art groups may be available just for people who have cancer. ¨ Be kind to your body and mind. Getting enough sleep, eating a nutritious diet, and taking time to do things you enjoy can contribute to an overall feeling of balance in your life and help reduce stress. ¨ Get help if you need it. Discuss your concerns with your doctor or counselor. · If you are vomiting or have diarrhea:  ¨ Drink plenty of fluids (enough so that your urine is light yellow or clear like water) to prevent dehydration. Choose water and other caffeine-free clear liquids. If you have kidney, heart, or liver disease and have to limit fluids, talk with your doctor before you increase the amount of fluids you drink. ¨ When you are able to eat, try clear soups, mild foods, and liquids until all symptoms are gone for 12 to 48 hours. Other good choices include dry toast, crackers, cooked cereal, and gelatin dessert, such as Jell-O.  · Avoid colds and flu. Get a pneumococcal vaccine shot. If you have had one before, ask your doctor whether you need another dose. Get a flu shot every year.  If you must be around people with colds or flu, wash your hands often. · Do not smoke. If you need help quitting, talk to your doctor about stop-smoking programs and medicines. These can increase your chances of quitting for good. When should you call for help? Call 911 anytime you think you may need emergency care. For example, call if:  · You passed out (lost consciousness). · You vomit blood or what looks like coffee grounds. · You pass maroon or very bloody stools. · You have a fever that does not go away or goes higher. · You are very short of breath. Call your doctor now or seek immediate medical care if:  · You have any unusual bleeding, such as:  ¨ Blood spots under the skin. ¨ A nosebleed that you cannot stop. ¨ Bleeding gums when you brush your teeth. ¨ Blood in your urine. ¨ Vaginal bleeding when you are not having your period, or heavy period bleeding. · You are dizzy or lightheaded, or you feel like you may faint. · Your stools are black and tarlike or have streaks of blood. · You have signs of needing more fluids. You have sunken eyes and a dry mouth, and you pass only a little dark urine. · Vomiting has lasted longer than 24 hours and you are not able to keep fluids down. · You get a sudden, severe headache or stiff neck. · You have belly pain, or existing belly pain gets worse. · You have severe diarrhea (large, loose bowel movements every 1 to 2 hours). · You have joint pain or swelling. · You are confused or have trouble thinking clearly. Watch closely for changes in your health, and be sure to contact your doctor if:  · You feel much more tired than usual.  · You have weakness that is getting worse. · You feel sad or anxious for a long period of time, or your feelings interfere with your normal activities or relationships. Where can you learn more? Go to http://gayle-shai.info/.   Enter C267 in the search box to learn more about \"Chronic Myelogenous Leukemia: Care Instructions. \"  Current as of: July 26, 2016  Content Version: 11.3  © 3787-6312 [employer]. Care instructions adapted under license by [employer] (which disclaims liability or warranty for this information). If you have questions about a medical condition or this instruction, always ask your healthcare professional. [place] [address] any warranty or liability for your use of this information.

## 2017-07-25 NOTE — PROGRESS NOTES
Hematology/Oncology  Progress Note    Name: Veronika Otero  Date: 2017  : 1975    Jhoana Wilson MD     Mr. Rosetta Olson is a 42y.o. year old male who was seen for newly diagnosed  CML    Current Therapy: Gleevec 400mg PO daily      Subjective:     Mr. Rosetta Olson is a 58-year-old -American man who was recently diagnosed with CML After presenting to the emergency room with a swollen left knee. He does have a history of gout. He had arthrocentesis with removal of fluid from the knee. However his CBC revealed that he had a WBC count of about 392,000. The peripheral smear was concerning for possible myeloproliferative disorder. 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 He has a very high tumor burden and he also has extensive hepatomegaly. He was started on Gleevec 400 mg PO daily as well as Allopurinol at a dose of 300 mg daily. He reports he feels well. He has no complaints of concerns to report. He denies abdominal pain or discomfort. Denies dizziness, headaches, fevers. His appetite and energy level are reasonably well. He has no complaints of pain. His  and GI habits are normal.  The patient reports that his appetite and energy level are continuing to improve. He continues to tolerate the Gleevec without untoward side effects. Initially during the course of his therapy he developed some therapy induced anemia. He was instructed to begin taking an oral iron supplement daily. Past medical history, family history, and social history: these were reviewed and remains unchanged.     Past Medical History:   Diagnosis Date    Anemia     Arthritis     Right knee    CML [facility]chronic myelocytic leukemia) ([facility] [address]     STD (sexually transmitted disease)      Past Surgical History:   Procedure Laterality Date    HX KNEE ARTHROSCOPY       Social History     Social History    Marital status: UNKNOWN     Spouse name: N/A   Juan Miguel Garcia Number of children: N/A    Years of education: N/A     Occupational History    Not on file. Social History Main Topics    Smoking status: Current Every Day Smoker     Packs/day: 0.25     Years: 8.00     Types: Cigarettes    Smokeless tobacco: Never Used    Alcohol use No    Drug use: No    Sexual activity: Not on file     Other Topics Concern    Not on file     Social History Narrative     Family History   Problem Relation Age of Onset    Hypertension Maternal Grandmother      Current Outpatient Prescriptions   Medication Sig Dispense Refill    imatinib (GLEEVEC) 400 mg tablet Take 1 tab po qd 30 Tab 6    pseudoephedrine (SUDAFED) 30 mg tablet Take 1 Tab by mouth every four (4) hours as needed for Congestion. 16 Tab 0    HYDROcodone-acetaminophen (NORCO) 5-325 mg per tablet Take 1 Tab by mouth every four (4) hours as needed for Pain. Max Daily Amount: 6 Tabs. 30 Tab 0    allopurinol (ZYLOPRIM) 100 mg tablet Take 3 Tabs by mouth daily. 30 Tab 3    ferrous sulfate 325 mg (65 mg iron) tablet Take 1 Tab by mouth daily (with breakfast). 30 Tab 3       Review of Systems  Constitutional: The patient has no acute distress or discomfort. HEENT: The patient denies recent head trauma, eye pain, blurred vision,  hearing deficit, oropharyngeal mucosal pain or lesions, and the patient denies throat pain or discomfort. Lymphatics: The patient denies palpable peripheral lymphadenopathy. Hematologic: The patient denies having bruising, bleeding, or progressive fatigue. Respiratory: Patient denies having shortness of breath, cough, sputum production, fever, or dyspnea on exertion. Cardiovascular: The patient denies having leg pain, leg swelling, heart palpitations, chest permit, chest pain, or lightheadedness. The patient denies having dyspnea on exertion. Gastrointestinal: The patient denies having nausea, emesis, or diarrhea. The patient denies having any hematemesis or blood in the stool.   Genitourinary: Patient denies having urinary urgency, frequency, or dysuria. The patient denies having blood in the urine. Psychological: The patient denies having symptoms of nervousness, anxiety, depression, or thoughts of harming himself some of this. Skin: Patient denies having skin rashes, skin, ulcerations, or unexplained itching or pruritus. Musculoskeletal: The patient denies having pain in the joints or bones. Objective:     Visit Vitals    /82    Pulse 72    Temp 98.2 °F (36.8 °C)    Ht 6' 8\" (2.032 m)    Wt 103.4 kg (228 lb)    BMI 25.05 kg/m2     ECOG PS 0  Physical Exam:   Gen. Appearance: The patient is in no acute distress. Skin: There is no bruise or rash. HEENT: The exam is unremarkable. Neck: Supple without lymphadenopathy or thyromegaly. Lungs: Clear to auscultation and percussion; there are no wheezes or rhonchi. Heart: Regular rate and rhythm; there are no murmurs, gallops, or rubs. Abdomen: Bowel sounds are present and normal.  There is no guarding, tenderness, or hepatosplenomegaly. Extremities: There is no clubbing, cyanosis, or edema. Neurologic: There are no focal neurologic deficits. Lymphatics: There is no palpable peripheral lymphadenopathy. Musculoskeletal: The patient has full range of motion at all joints. There is no evidence of joint deformity or effusions. There is no focal joint tenderness. Psychological/psychiatric: There is no clinical evidence of anxiety, depression, or melancholy.     Lab data:      Results for orders placed or performed during the hospital encounter of 07/25/17   CBC WITH 3 PART DIFF     Status: Abnormal   Result Value Ref Range Status    WBC 6.7 4.5 - 13.0 K/uL Final    RBC 4.74 4.10 - 5.10 M/uL Final    HGB 12.8 12.0 - 16.0 g/dL Final    HCT 39.8 36 - 48 % Final    MCV 84.0 78 - 102 FL Final    MCH 27.0 25.0 - 35.0 PG Final    MCHC 32.2 31 - 37 g/dL Final    RDW 16.5 (H) 11.5 - 14.5 % Final    PLATELET 294 (L) 182 - 440 K/uL Final NEUTROPHILS 63 40 - 70 % Final    MIXED CELLS 7 0.1 - 17 % Final    LYMPHOCYTES 30 14 - 44 % Final    ABS. NEUTROPHILS 4.2 1.8 - 9.5 K/UL Final    ABS. MIXED CELLS 0.5 0.0 - 2.3 K/uL Final    ABS. LYMPHOCYTES 2.0 1.1 - 5.9 K/UL Final     Comment: Test performed at [address] Results Reviewed by Medical Director. DF AUTOMATED   Final           Assessment:     1. CML (chronic myelocytic leukemia) ([facility]    2. Myeloproliferative disorder ([facility]    3. Antineoplastic chemotherapy induced anemia          Plan:   CML: The CBC from today shows that his WBC counts are now normal at 6.7. The absolute neutrophil count is normal at 6.8 with an absolute lymphocyte count of 1.5. He will continue Gleevec 400mg PO daily. I will check a BCR-ABL1 level at this time as well as a CMP. Allopurinol was recently discontinued. Chemotherapy-induced anemia: At this time the CBC shows that his hemoglobin is 12.8 with hematocrit of 39.8 %. I will check his iron profile and ferritin levels at this time. I have explained to the patient that systemic treatment with Procrit at a dose of 60,000 units subcutaneous every 2 weeks is reserved for hemoglobin level is below 10 g/dL with hematocrit levels below 30%. Leukocytosis: Extensive leukocytosis. His WBC count is 6.7 and his neutrophilia has resolved with a normal neutrophil count of 63% and absolute neutrophil count of 4.2.     Follow-up in clinic in 2 months  Orders Placed This Encounter    COMPLETE CBC & AUTO DIFF WBC    METABOLIC PANEL, COMPREHENSIVE     Standing Status:   Future     Number of Occurrences:   1     Standing Expiration Date:   7/26/2018    FERRITIN     Standing Status:   Future     Number of Occurrences:   1     Standing Expiration Date:   7/26/2018    IRON PROFILE     Standing Status:   Future     Number of Occurrences:   1     Standing Expiration Date:   7/26/2018    BCR-ABL1, PCR, QT     Standing Status:   Future     Number of Occurrences:   1     Standing Expiration Date:   7/26/2018    InHouse CBC ([employer])     Standing Status:   Future     Number of Occurrences:   1     Standing Expiration Date:   8/1/2017       Joesph Ibarra MD  7/25/2017      Please note: This document has been produced using voice recognition software. Unrecognized errors in transcription may be present.

## 2017-07-26 LAB
A-G RATIO,AGRAT: 1.6 RATIO (ref 1.1–2.6)
ALBUMIN SERPL-MCNC: 4 G/DL (ref 3.5–5)
ALP SERPL-CCNC: 111 U/L (ref 25–115)
ALT SERPL-CCNC: 16 U/L (ref 5–40)
ANION GAP SERPL CALC-SCNC: 15 MMOL/L
AST SERPL W P-5'-P-CCNC: 21 U/L (ref 10–37)
BILIRUB SERPL-MCNC: 0.3 MG/DL (ref 0.2–1.2)
BUN SERPL-MCNC: 14 MG/DL (ref 6–22)
CALCIUM SERPL-MCNC: 8.5 MG/DL (ref 8.4–10.4)
CHLORIDE SERPL-SCNC: 98 MMOL/L (ref 98–110)
CO2 SERPL-SCNC: 25 MMOL/L (ref 20–32)
CREAT SERPL-MCNC: 1.2 MG/DL (ref 0.5–1.2)
FE % SATURATION,PSAT: 23 % (ref 20–50)
FERRITIN SERPL-MCNC: 92 NG/ML (ref 22–322)
GFRAA, 66117: >60
GFRNA, 66118: >60
GLOBULIN,GLOB: 2.5 G/DL (ref 2–4)
GLUCOSE SERPL-MCNC: 85 MG/DL (ref 65–99)
IRON,IRN: 55 MCG/DL (ref 45–160)
POTASSIUM SERPL-SCNC: 4 MMOL/L (ref 3.5–5.5)
PROT SERPL-MCNC: 6.5 G/DL (ref 6.4–8.3)
SODIUM SERPL-SCNC: 138 MMOL/L (ref 133–145)
TIBC,TIBC: 245 MCG/DL (ref 228–428)
UIBC SERPL-MCNC: 190 MCG/DL (ref 110–370)

## 2017-08-02 LAB
B2B2 TRANSCRIPT: 61.99 %
B3B2 TRANSCRIPT: 29.18 %
BACKGROUND BCR-ABL: ABNORMAL
DIRECTOR REVIEW BCR-ABL: ABNORMAL
E1A2 TRANSCRIPT 480502: ABNORMAL %
INTERPRETATION BCR-ABL: ABNORMAL
METHODOLOGY BCR-ABL1: ABNORMAL

## 2017-08-18 ENCOUNTER — HOSPITAL ENCOUNTER (OUTPATIENT)
Dept: ONCOLOGY | Age: 42
Discharge: HOME | End: 2017-08-18

## 2017-08-18 ENCOUNTER — CLINICAL SUPPORT (OUTPATIENT)
Dept: ONCOLOGY | Age: 42
End: 2017-08-18

## 2017-08-18 ENCOUNTER — HOSPITAL ENCOUNTER (OUTPATIENT)
Dept: INFUSION THERAPY | Age: 42
Discharge: HOME | End: 2017-08-18
Payer: COMMERCIAL

## 2017-08-18 VITALS
OXYGEN SATURATION: 97 % | HEART RATE: 66 BPM | RESPIRATION RATE: 16 BRPM | DIASTOLIC BLOOD PRESSURE: 81 MMHG | TEMPERATURE: 97 F | SYSTOLIC BLOOD PRESSURE: 117 MMHG

## 2017-08-18 DIAGNOSIS — D64.9 ANEMIA, UNSPECIFIED: ICD-10-CM

## 2017-08-18 DIAGNOSIS — D64.9 ANEMIA, UNSPECIFIED: Primary | ICD-10-CM

## 2017-08-18 LAB
BASO+EOS+MONOS # BLD AUTO: 0.8 K/UL (ref 0–2.3)
BASO+EOS+MONOS # BLD AUTO: 8 % (ref 0.1–17)
DIFFERENTIAL METHOD BLD: ABNORMAL
ERYTHROCYTE [DISTWIDTH] IN BLOOD BY AUTOMATED COUNT: 18 % (ref 11.5–14.5)
HCT VFR BLD AUTO: 41.2 % (ref 36–48)
HGB BLD-MCNC: 13.4 G/DL (ref 12–16)
LYMPHOCYTES # BLD: 2.2 K/UL (ref 1.1–5.9)
LYMPHOCYTES NFR BLD: 24 % (ref 14–44)
MCH RBC QN AUTO: 27.5 PG (ref 25–35)
MCHC RBC AUTO-ENTMCNC: 32.5 G/DL (ref 31–37)
MCV RBC AUTO: 84.6 FL (ref 78–102)
NEUTS SEG # BLD: 6.2 K/UL (ref 1.8–9.5)
NEUTS SEG NFR BLD: 68 % (ref 40–70)
PLATELET # BLD AUTO: 109 K/UL (ref 140–440)
RBC # BLD AUTO: 4.87 M/UL (ref 4.1–5.1)
WBC # BLD AUTO: 9.2 K/UL (ref 4.5–13)

## 2017-08-18 PROCEDURE — 36415 COLL VENOUS BLD VENIPUNCTURE: CPT

## 2017-08-18 NOTE — PROGRESS NOTES
[address] [facility] Progress Note    Date: 2017    Name: Ganga Selby    MRN: [id number]         : 1975      Mr. Mcneil arrived to [facility] at [phone]. Mr. Caridad Yao was assessed and education was provided. Mr. Tamiko Conte vitals were reviewed. Visit Vitals    /81 (BP 1 Location: Left arm, BP Patient Position: Sitting)    Pulse 66    Temp 97 °F (36.1 °C)    Resp 16    SpO2 97%       Pt was observed for 5 minutes after obtaining vital signs prior to initiating treatment. Blood drawn for labs via right antecubital venipuncture x1 attempt. Lab results were obtained and reviewed. Recent Results (from the past 12 hour(s))   CBC WITH 3 PART DIFF    Collection Time: 17  1:08 PM   Result Value Ref Range    WBC 9.2 4.5 - 13.0 K/uL    RBC 4.87 4.10 - 5.10 M/uL    HGB 13.4 12.0 - 16.0 g/dL    HCT 41.2 36 - 48 %    MCV 84.6 78 - 102 FL    MCH 27.5 25.0 - 35.0 PG    MCHC 32.5 31 - 37 g/dL    RDW 18.0 (H) 11.5 - 14.5 %    PLATELET 720 (L) 204 - 440 K/uL    NEUTROPHILS 68 40 - 70 %    MIXED CELLS 8 0.1 - 17 %    LYMPHOCYTES 24 14 - 44 %    ABS. NEUTROPHILS 6.2 1.8 - 9.5 K/UL    ABS. MIXED CELLS 0.8 0.0 - 2.3 K/uL    ABS. LYMPHOCYTES 2.2 1.1 - 5.9 K/UL    DF AUTOMATED         Procrit held at this time per order. Mr. Caridad Yao tolerated well without complaints. Mr. Caridad Yao was discharged from [place] [address] in stable condition at 1315. He is to return on 17 at 1400 for his next appointment.     Pranay Sykes RN  2017

## 2017-09-15 ENCOUNTER — HOSPITAL ENCOUNTER (OUTPATIENT)
Dept: ONCOLOGY | Age: 42
Discharge: HOME | End: 2017-09-15

## 2017-09-15 ENCOUNTER — CLINICAL SUPPORT (OUTPATIENT)
Dept: ONCOLOGY | Age: 42
End: 2017-09-15

## 2017-09-15 ENCOUNTER — HOSPITAL ENCOUNTER (OUTPATIENT)
Dept: INFUSION THERAPY | Age: 42
Discharge: HOME | End: 2017-09-15
Payer: COMMERCIAL

## 2017-09-15 VITALS
TEMPERATURE: 98.1 F | DIASTOLIC BLOOD PRESSURE: 76 MMHG | HEART RATE: 81 BPM | RESPIRATION RATE: 16 BRPM | SYSTOLIC BLOOD PRESSURE: 113 MMHG

## 2017-09-15 DIAGNOSIS — D64.9 ANEMIA, UNSPECIFIED: ICD-10-CM

## 2017-09-15 DIAGNOSIS — D64.9 ANEMIA, UNSPECIFIED: Primary | ICD-10-CM

## 2017-09-15 LAB
BASO+EOS+MONOS # BLD AUTO: 0.7 K/UL (ref 0–2.3)
BASO+EOS+MONOS # BLD AUTO: 7 % (ref 0.1–17)
DIFFERENTIAL METHOD BLD: ABNORMAL
ERYTHROCYTE [DISTWIDTH] IN BLOOD BY AUTOMATED COUNT: 19.2 % (ref 11.5–14.5)
HCT VFR BLD AUTO: 36 % (ref 36–48)
HGB BLD-MCNC: 11.7 G/DL (ref 12–16)
LYMPHOCYTES # BLD: 1.9 K/UL (ref 1.1–5.9)
LYMPHOCYTES NFR BLD: 18 % (ref 14–44)
MCH RBC QN AUTO: 27.9 PG (ref 25–35)
MCHC RBC AUTO-ENTMCNC: 32.5 G/DL (ref 31–37)
MCV RBC AUTO: 85.7 FL (ref 78–102)
NEUTS SEG # BLD: 8.1 K/UL (ref 1.8–9.5)
NEUTS SEG NFR BLD: 75 % (ref 40–70)
PLATELET # BLD AUTO: 183 K/UL (ref 140–440)
RBC # BLD AUTO: 4.2 M/UL (ref 4.1–5.1)
WBC # BLD AUTO: 10.7 K/UL (ref 4.5–13)

## 2017-09-15 PROCEDURE — 36415 COLL VENOUS BLD VENIPUNCTURE: CPT

## 2017-09-15 NOTE — PROGRESS NOTES
[facility] Progress Note    Date: September 15, 2017    Name: Roberto Madrigal    MRN: [id number]         : 1975      Mr. Mcneil arrived in the [facility] today, at 12, in stable condition, here for Q 2 Week, CBC/Procrit injection. He was assessed and education was provided. Mr. Ole De La Garza vitals were reviewed. Visit Vitals    /76 (BP 1 Location: Left arm, BP Patient Position: At rest;Sitting)    Pulse 81    Temp 98.1 °F (36.7 °C)    Resp 16           CBC was drawn from his left AC at 1306, per order, and without incident. Lab results were obtained and reviewed. Recent Results (from the past 12 hour(s))   CBC WITH 3 PART DIFF    Collection Time: 09/15/17  1:06 PM   Result Value Ref Range    WBC 10.7 4.5 - 13.0 K/uL    RBC 4.20 4. 10 - 5.10 M/uL    HGB 11.7 (L) 12.0 - 16.0 g/dL    HCT 36.0 36 - 48 %    MCV 85.7 78 - 102 FL    MCH 27.9 25.0 - 35.0 PG    MCHC 32.5 31 - 37 g/dL    RDW 19.2 (H) 11.5 - 14.5 %    PLATELET 286 786 - 669 K/uL    NEUTROPHILS 75 (H) 40 - 70 %    MIXED CELLS 7 0.1 - 17 %    LYMPHOCYTES 18 14 - 44 %    ABS. NEUTROPHILS 8.1 1.8 - 9.5 K/UL    ABS. MIXED CELLS 0.7 0.0 - 2.3 K/uL    ABS. LYMPHOCYTES 1.9 1.1 - 5.9 K/UL    DF AUTOMATED               Procrit injection was HELD today, per order. Mr. Sunil Gilmore tolerated well, and had no complaints. Mr. Sunil Gilmore was discharged from [place] [address] in stable condition at 1320. He is to return in 2 weeks, on Friday, 17,  at 1400,  for his next appointment, for CBC/Procrit injection.      Chandan Diamond RN  September 15, 2017  1:03 PM

## 2017-09-26 ENCOUNTER — OFFICE VISIT (OUTPATIENT)
Dept: ONCOLOGY | Age: 42
End: 2017-09-26

## 2017-09-26 ENCOUNTER — HOSPITAL ENCOUNTER (OUTPATIENT)
Dept: ONCOLOGY | Age: 42
Discharge: HOME | End: 2017-09-26

## 2017-09-26 VITALS
TEMPERATURE: 97.8 F | SYSTOLIC BLOOD PRESSURE: 133 MMHG | HEART RATE: 64 BPM | WEIGHT: 250.8 LBS | DIASTOLIC BLOOD PRESSURE: 79 MMHG | BODY MASS INDEX: 27.55 KG/M2

## 2017-09-26 DIAGNOSIS — D64.81 ANTINEOPLASTIC CHEMOTHERAPY INDUCED ANEMIA: ICD-10-CM

## 2017-09-26 DIAGNOSIS — D72.829 LEUKOCYTOSIS, UNSPECIFIED TYPE: ICD-10-CM

## 2017-09-26 DIAGNOSIS — D47.1 MYELOPROLIFERATIVE DISORDER (HCC): ICD-10-CM

## 2017-09-26 DIAGNOSIS — T45.1X5A ANTINEOPLASTIC CHEMOTHERAPY INDUCED ANEMIA: ICD-10-CM

## 2017-09-26 DIAGNOSIS — C92.10 CML (CHRONIC MYELOCYTIC LEUKEMIA) (HCC): Primary | ICD-10-CM

## 2017-09-26 DIAGNOSIS — C92.10 CML (CHRONIC MYELOCYTIC LEUKEMIA) (HCC): ICD-10-CM

## 2017-09-26 LAB
BASO+EOS+MONOS # BLD AUTO: 0.6 K/UL (ref 0–2.3)
BASO+EOS+MONOS # BLD AUTO: 7 % (ref 0.1–17)
DIFFERENTIAL METHOD BLD: ABNORMAL
ERYTHROCYTE [DISTWIDTH] IN BLOOD BY AUTOMATED COUNT: 19.5 % (ref 11.5–14.5)
HCT VFR BLD AUTO: 36.1 % (ref 36–48)
HGB BLD-MCNC: 12 G/DL (ref 12–16)
LYMPHOCYTES # BLD: 2.2 K/UL (ref 1.1–5.9)
LYMPHOCYTES NFR BLD: 25 % (ref 14–44)
MCH RBC QN AUTO: 28.9 PG (ref 25–35)
MCHC RBC AUTO-ENTMCNC: 33.2 G/DL (ref 31–37)
MCV RBC AUTO: 87 FL (ref 78–102)
NEUTS SEG # BLD: 6.1 K/UL (ref 1.8–9.5)
NEUTS SEG NFR BLD: 69 % (ref 40–70)
PLATELET # BLD AUTO: 122 K/UL (ref 140–440)
RBC # BLD AUTO: 4.15 M/UL (ref 4.1–5.1)
WBC # BLD AUTO: 8.9 K/UL (ref 4.5–13)

## 2017-09-26 NOTE — MR AVS SNAPSHOT
Visit Information Date & Time Provider Department Dept. Phone Encounter #  
 9/26/2017  2:30 PM [facility] Office [phone] [id number] Follow-up Instructions Return in about 8 weeks (around 11/21/2017). Your Appointments 11/28/2017  1:30 PM  
Office Visit with MD Brionna Peters [address] [place] [address] [facility] 8 wk [facility]  
 [facility] [address] [facility] [address] [facility] [address]  
[phone]  
  
   
 [facility] [address] Upcoming Health Maintenance Date Due Pneumococcal 19-64 Highest Risk (1 of 3 - PCV13) 10/4/1993 DTaP/Tdap/Td series (1 - Tdap) 10/4/1995 INFLUENZA AGE 9 TO ADULT 8/1/2017 Allergies as of 9/26/2017  Review Complete On: 9/15/2017 By: Dhaval Collins RN No Known Allergies Current Immunizations  Reviewed on 9/15/2017 No immunizations on file. Not reviewed this visit You Were Diagnosed With   
  
 Codes Comments CML (chronic myelocytic leukemia) ([facility]    -  Primary ICD-10-CM: C92.10 ICD-9-CM: 205.10 Vitals BP Pulse Temp Weight(growth percentile) BMI Smoking Status 133/79 (BP 1 Location: Left arm, BP Patient Position: Sitting) 64 97.8 °F (36.6 °C) (Oral) 250 lb 12.8 oz (113.8 kg) 27.55 kg/m2 Current Every Day Smoker BMI and BSA Data Body Mass Index Body Surface Area  
 27.55 kg/m 2 2.53 m 2 Preferred Pharmacy Pharmacy Name Phone [facility] [address] [facility] [phone] Your Updated Medication List  
  
   
This list is accurate as of: 9/26/17  4:16 PM.  Always use your most recent med list.  
  
  
  
  
 allopurinol 100 mg tablet Commonly known as:  Zita Radha Take 3 Tabs by mouth daily. ferrous sulfate 325 mg (65 mg iron) tablet Take 1 Tab by mouth daily (with breakfast). HYDROcodone-acetaminophen 5-325 mg per tablet Commonly known as:  Adia Marin  
 Take 1 Tab by mouth every four (4) hours as needed for Pain. Max Daily Amount: 6 Tabs. imatinib 400 mg tablet Commonly known as:  GLEEVEC Take 1 tab po qd  
  
 pseudoephedrine 30 mg tablet Commonly known as:  SUDAFED Take 1 Tab by mouth every four (4) hours as needed for Congestion. We Performed the Following BCR-ABL1, PCR, QT [ASC05013 Custom] COMPLETE CBC & AUTO DIFF WBC [21772 CPT(R)] FERRITIN [51596 CPT(R)] IRON PROFILE [id number] CPT(R)] METABOLIC PANEL, COMPREHENSIVE [96388 CPT(R)] Follow-up Instructions Return in about 8 weeks (around 11/21/2017). To-Do List   
 09/26/2017 Lab:  BCR-ABL1, PCR, QT   
  
 09/26/2017 Lab:  CBC WITH 3 PART DIFF   
  
 09/26/2017 Lab:  FERRITIN   
  
 09/26/2017 Lab:  IRON PROFILE   
  
 09/26/2017 Lab:  METABOLIC PANEL, COMPREHENSIVE   
  
 09/29/2017 2:00 PM  
  Appointment with [address] at [place] [address] ([phone]) Patient Instructions Chronic Myelogenous Leukemia: Care Instructions Your Care Instructions Leukemia is a type of cancer that causes your body to make too many blood cells, especially white blood cells. White blood cells are a part of your immune system, which helps protect you from infection and disease. In chronic myelogenous leukemia (CML), large numbers of white blood cells are made by the bone marrow. Over time, these cells may not work as they should, and they may cause symptoms as they begin to crowd out healthy white blood cells and other parts of your blood. But chronic leukemia gets worse slowly, and you may have few or no symptoms for months or years. It is often discovered during a routine blood test. 
There are many treatments for CML, including chemotherapy and targeted therapy. A healthy diet, exercise, extra rest, and a strong support system can help you feel better.  Many people also find that getting counseling or joining a support group helps them cope with their illness. When you find out that you have cancer, you may feel many emotions and may need some help coping. Seek out family, friends, and counselors for support. You also can do things at home to make yourself feel better while you go through treatment. Call the [facility] ([phone]) or visit its website at [address] for more information. Follow-up care is a key part of your treatment and safety. Be sure to make and go to all appointments, and call your doctor if you are having problems. It's also a good idea to know your test results and keep a list of the medicines you take. How can you care for yourself at home? · Take your medicines exactly as prescribed. You may get medicine for nausea, vomiting, or pain (although leukemia rarely causes pain). Call your doctor if you think you are having a problem with your medicine. You will get more details on the specific medicines your doctor prescribes. · Eat healthy food. If you do not feel like eating, try to eat food that has protein and extra calories to keep up your strength and prevent weight loss. Drink liquid meal replacements for extra calories and protein. Try to eat your main meal early. · Get some physical activity every day, but do not get too tired. Keep doing the hobbies you enjoy as your energy allows. · Take steps to control your stress and workload. Learn relaxation techniques. ¨ Share your feelings. Stress and tension affect our emotions. By expressing your feelings to others, you may be able to understand and cope with them. ¨ Consider joining a support group. Talking about a problem with your spouse, a good friend, or other people with similar problems is a good way to reduce tension and stress. ¨ Express yourself through art. Try writing, dance, art, or crafts to relieve tension. Some dance, writing, or art groups may be available just for people who have cancer. ¨ Be kind to your body and mind. Getting enough sleep, eating a nutritious diet, and taking time to do things you enjoy can contribute to an overall feeling of balance in your life and help reduce stress. ¨ Get help if you need it. Discuss your concerns with your doctor or counselor. · If you are vomiting or have diarrhea: ¨ Drink plenty of fluids (enough so that your urine is light yellow or clear like water) to prevent dehydration. Choose water and other caffeine-free clear liquids. If you have kidney, heart, or liver disease and have to limit fluids, talk with your doctor before you increase the amount of fluids you drink. ¨ When you are able to eat, try clear soups, mild foods, and liquids until all symptoms are gone for 12 to 48 hours. Other good choices include dry toast, crackers, cooked cereal, and gelatin dessert, such as Jell-O. · Avoid colds and flu. Get a pneumococcal vaccine shot. If you have had one before, ask your doctor whether you need another dose. Get a flu shot every year. If you must be around people with colds or flu, wash your hands often. · Do not smoke. If you need help quitting, talk to your doctor about stop-smoking programs and medicines. These can increase your chances of quitting for good. When should you call for help? Call 911 anytime you think you may need emergency care. For example, call if: 
· You passed out (lost consciousness). · You vomit blood or what looks like coffee grounds. · You pass maroon or very bloody stools. · You have a fever that does not go away or goes higher. · You are very short of breath. Call your doctor now or seek immediate medical care if: 
· You have any unusual bleeding, such as: ¨ Blood spots under the skin. ¨ A nosebleed that you cannot stop. ¨ Bleeding gums when you brush your teeth. ¨ Blood in your urine. ¨ Vaginal bleeding when you are not having your period, or heavy period bleeding. · You are dizzy or lightheaded, or you feel like you may faint. · Your stools are black and tarlike or have streaks of blood. · You have signs of needing more fluids. You have sunken eyes and a dry mouth, and you pass only a little dark urine. · Vomiting has lasted longer than 24 hours and you are not able to keep fluids down. · You get a sudden, severe headache or stiff neck. · You have belly pain, or existing belly pain gets worse. · You have severe diarrhea (large, loose bowel movements every 1 to 2 hours). · You have joint pain or swelling. · You are confused or have trouble thinking clearly. Watch closely for changes in your health, and be sure to contact your doctor if: 
· You feel much more tired than usual. 
· You have weakness that is getting worse. · You feel sad or anxious for a long period of time, or your feelings interfere with your normal activities or relationships. Where can you learn more? Go to http://gayle-shai.info/. Enter C276 in the search box to learn more about \"Chronic Myelogenous Leukemia: Care Instructions. \" Current as of: July 26, 2016 Content Version: 11.3 © 1456-7748 [employer]. Care instructions adapted under license by [employer] (which disclaims liability or warranty for this information). If you have questions about a medical condition or this instruction, always ask your healthcare professional. [place] [address] any warranty or liability for your use of this information. Please provide this summary of care documentation to your next provider. Your primary care clinician is listed as [address] [place] [address]. If you have any questions after today's visit, please call [phone].

## 2017-09-26 NOTE — PROGRESS NOTES
Hematology/Oncology  Progress Note    Name: Wild Braden  Date: 2017  : 1975    Evelia Frances MD     Mr. Ethel Huizar is a 42y.o. year old male who was seen for newly diagnosed CML    Current Therapy: Gleevec 400mg PO daily      Subjective:     Mr. Ethel Huizar is a 59-year-old -American man who was recently diagnosed with CML after presenting to the emergency room with a swollen left knee. He does have a history of gout. He had arthrocentesis with removal of fluid from the knee. However his CBC revealed that he had a WBC count of about 392,000. The peripheral smear was concerning for possible myeloproliferative disorder. 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 He has a very high tumor burden and he also has extensive hepatomegaly. He was started on Gleevec 400 mg PO daily as well as Allopurinol at a dose of 300 mg daily. He reports he feels well. He has no complaints or concerns to report. He denies abdominal pain or discomfort. Denies dizziness, headaches, fevers. His appetite and energy level are reasonably well. He has no complaints of pain. His  and GI habits are normal.  The patient reports that his appetite and energy level are continuing to improve. He continues to tolerate the Gleevec without untoward side effects. Initially during the course of his therapy he developed some therapy induced anemia. He was instructed to begin taking an oral iron supplement daily. Past medical history, family history, and social history: these were reviewed and remains unchanged.     Past Medical History:   Diagnosis Date    Anemia     Arthritis     Right knee    [facility]chronic myelocytic leukemia) ([facility]     STD (sexually transmitted disease)      Past Surgical History:   Procedure Laterality Date    HX KNEE ARTHROSCOPY       Social History     Social History    Marital status: UNKNOWN     Spouse name: N/A    Number of children: N/A    Years of education: N/A     Occupational History    Not on file. Social History Main Topics    Smoking status: Current Every Day Smoker     Packs/day: 0.25     Years: 8.00     Types: Cigarettes    Smokeless tobacco: Never Used    Alcohol use No    Drug use: No    Sexual activity: Not on file     Other Topics Concern    Not on file     Social History Narrative     Family History   Problem Relation Age of Onset    Hypertension Maternal Grandmother      Current Outpatient Prescriptions   Medication Sig Dispense Refill    imatinib (GLEEVEC) 400 mg tablet Take 1 tab po qd 30 Tab 6    pseudoephedrine (SUDAFED) 30 mg tablet Take 1 Tab by mouth every four (4) hours as needed for Congestion. 16 Tab 0    HYDROcodone-acetaminophen (NORCO) 5-325 mg per tablet Take 1 Tab by mouth every four (4) hours as needed for Pain. Max Daily Amount: 6 Tabs. 30 Tab 0    allopurinol (ZYLOPRIM) 100 mg tablet Take 3 Tabs by mouth daily. 30 Tab 3    ferrous sulfate 325 mg (65 mg iron) tablet Take 1 Tab by mouth daily (with breakfast). 30 Tab 3       Review of Systems  Constitutional: The patient has no acute distress or discomfort. HEENT: The patient denies recent head trauma, eye pain, blurred vision,  hearing deficit, oropharyngeal mucosal pain or lesions, and the patient denies throat pain or discomfort. Lymphatics: The patient denies palpable peripheral lymphadenopathy. Hematologic: The patient denies having bruising, bleeding, or progressive fatigue. Respiratory: Patient denies having shortness of breath, cough, sputum production, fever, or dyspnea on exertion. Cardiovascular: The patient denies having leg pain, leg swelling, heart palpitations, chest permit, chest pain, or lightheadedness. The patient denies having dyspnea on exertion. Gastrointestinal: The patient denies having nausea, emesis, or diarrhea. The patient denies having any hematemesis or blood in the stool.   Genitourinary: Patient denies having urinary urgency, frequency, or dysuria. The patient denies having blood in the urine. Psychological: The patient denies having symptoms of nervousness, anxiety, depression, or thoughts of harming himself some of this. Skin: Patient denies having skin rashes, skin, ulcerations, or unexplained itching or pruritus. Musculoskeletal: The patient denies having pain in the joints or bones. Objective:     Visit Vitals    /79 (BP 1 Location: Left arm, BP Patient Position: Sitting)    Pulse 64    Temp 97.8 °F (36.6 °C) (Oral)    Wt 113.8 kg (250 lb 12.8 oz)    BMI 27.55 kg/m2     ECOG PS 0  Physical Exam:   Gen. Appearance: The patient is in no acute distress. Skin: There is no bruise or rash. HEENT: The exam is unremarkable. Neck: Supple without lymphadenopathy or thyromegaly. Lungs: Clear to auscultation and percussion; there are no wheezes or rhonchi. Heart: Regular rate and rhythm; there are no murmurs, gallops, or rubs. Abdomen: Bowel sounds are present and normal.  There is no guarding, tenderness, or hepatosplenomegaly. Extremities: There is no clubbing, cyanosis, or edema. Neurologic: There are no focal neurologic deficits. Lymphatics: There is no palpable peripheral lymphadenopathy. Musculoskeletal: The patient has full range of motion at all joints. There is no evidence of joint deformity or effusions. There is no focal joint tenderness. Psychological/psychiatric: There is no clinical evidence of anxiety, depression, or melancholy.     Lab data:      Results for orders placed or performed during the hospital encounter of 09/26/17   CBC WITH 3 PART DIFF     Status: Abnormal   Result Value Ref Range Status    WBC 8.9 4.5 - 13.0 K/uL Final    RBC 4.15 4.10 - 5.10 M/uL Final    HGB 12.0 12.0 - 16.0 g/dL Final    HCT 36.1 36 - 48 % Final    MCV 87.0 78 - 102 FL Final    MCH 28.9 25.0 - 35.0 PG Final    MCHC 33.2 31 - 37 g/dL Final    RDW 19.5 (H) 11.5 - 14.5 % Final PLATELET 980 (L) 590 - 440 K/uL Final    NEUTROPHILS 69 40 - 70 % Final    MIXED CELLS 7 0.1 - 17 % Final    LYMPHOCYTES 25 14 - 44 % Final    ABS. NEUTROPHILS 6.1 1.8 - 9.5 K/UL Final    ABS. MIXED CELLS 0.6 0.0 - 2.3 K/uL Final    ABS. LYMPHOCYTES 2.2 1.1 - 5.9 K/UL Final     Comment: Test performed at [address] Results Reviewed by Medical Director. DF AUTOMATED   Final           Assessment:     1. CML (chronic myelocytic leukemia) ([facility] [address]    2. Myeloproliferative disorder ([facility]    3. Antineoplastic chemotherapy induced anemia    4. Leukocytosis, unspecified type          Plan:   CML/myeloproliferative disorder: The CBC from today shows that his WBC counts are now normal at 8.9. The absolute neutrophil count is normal at 6.8 with an absolute lymphocyte count of 1.5. He will continue Gleevec 400mg PO daily. I will check a BCR-ABL1 level at this time as well as a CMP. Chemotherapy-induced anemia: At this time the CBC shows that his hemoglobin is 12.0g/dL with hematocrit of 36.1%. I will check his iron profile and ferritin levels at this time. I have explained to the patient that systemic treatment with Procrit at a dose of 60,000 units subcutaneous every 2 weeks is reserved for hemoglobin level is below 10 g/dL with hematocrit levels below 30%. Leukocytosis: Extensive leukocytosis. His WBC count today is 8.0 and his neutrophil count of 69% and absolute neutrophil count of 6.1. I explained to the patient that all these numbers are within normal limits. I plan to see the patient in 2 months or sooner if indicated.     Orders Placed This Encounter    COMPLETE CBC & AUTO DIFF WBC    InHouse CBC ([facility])     Standing Status:   Future     Number of Occurrences:   1     Standing Expiration Date:   10/3/2017    IRON PROFILE     Standing Status:   Future     Number of Occurrences:   1     Standing Expiration Date:   9/27/2018    FERRITIN     Standing Status:   Future     Number of Occurrences:   1     Standing Expiration Date:   7/57/4516    METABOLIC PANEL, COMPREHENSIVE     Standing Status:   Future     Number of Occurrences:   1     Standing Expiration Date:   9/27/2018    BCR-ABL1, PCR, QT     Standing Status:   Future     Number of Occurrences:   1     Standing Expiration Date:   9/27/2018       Morgan Odom MD  9/26/2017

## 2017-09-26 NOTE — PROGRESS NOTES
Received order today, from Julianna Jaimes NP, to discontinue Procrit injections for Mr. Sivan Hoffman. Therefore, order was scanned into connect care, and all future [facility] appointments were cancelled accordingly. Mr. Tobi Leonard was made aware, during his office visit with Dr. Herman Kelley today.

## 2017-09-26 NOTE — PATIENT INSTRUCTIONS
Chronic Myelogenous Leukemia: Care Instructions  Your Care Instructions  Leukemia is a type of cancer that causes your body to make too many blood cells, especially white blood cells. White blood cells are a part of your immune system, which helps protect you from infection and disease. In chronic myelogenous leukemia (CML), large numbers of white blood cells are made by the bone marrow. Over time, these cells may not work as they should, and they may cause symptoms as they begin to crowd out healthy white blood cells and other parts of your blood. But chronic leukemia gets worse slowly, and you may have few or no symptoms for months or years. It is often discovered during a routine blood test.  There are many treatments for CML, including chemotherapy and targeted therapy. A healthy diet, exercise, extra rest, and a strong support system can help you feel better. Many people also find that getting counseling or joining a support group helps them cope with their illness. When you find out that you have cancer, you may feel many emotions and may need some help coping. Seek out family, friends, and counselors for support. You also can do things at home to make yourself feel better while you go through treatment. Call the [employer] (4-675.305.8780) or visit its website at [address] [employer] for more information. Follow-up care is a key part of your treatment and safety. Be sure to make and go to all appointments, and call your doctor if you are having problems. It's also a good idea to know your test results and keep a list of the medicines you take. How can you care for yourself at home? · Take your medicines exactly as prescribed. You may get medicine for nausea, vomiting, or pain (although leukemia rarely causes pain). Call your doctor if you think you are having a problem with your medicine. You will get more details on the specific medicines your doctor prescribes. · Eat healthy food.  If you do not feel like eating, try to eat food that has protein and extra calories to keep up your strength and prevent weight loss. Drink liquid meal replacements for extra calories and protein. Try to eat your main meal early. · Get some physical activity every day, but do not get too tired. Keep doing the hobbies you enjoy as your energy allows. · Take steps to control your stress and workload. Learn relaxation techniques. ¨ Share your feelings. Stress and tension affect our emotions. By expressing your feelings to others, you may be able to understand and cope with them. ¨ Consider joining a support group. Talking about a problem with your spouse, a good friend, or other people with similar problems is a good way to reduce tension and stress. ¨ Express yourself through art. Try writing, dance, art, or crafts to relieve tension. Some dance, writing, or art groups may be available just for people who have cancer. ¨ Be kind to your body and mind. Getting enough sleep, eating a nutritious diet, and taking time to do things you enjoy can contribute to an overall feeling of balance in your life and help reduce stress. ¨ Get help if you need it. Discuss your concerns with your doctor or counselor. · If you are vomiting or have diarrhea:  ¨ Drink plenty of fluids (enough so that your urine is light yellow or clear like water) to prevent dehydration. Choose water and other caffeine-free clear liquids. If you have kidney, heart, or liver disease and have to limit fluids, talk with your doctor before you increase the amount of fluids you drink. ¨ When you are able to eat, try clear soups, mild foods, and liquids until all symptoms are gone for 12 to 48 hours. Other good choices include dry toast, crackers, cooked cereal, and gelatin dessert, such as Jell-O.  · Avoid colds and flu. Get a pneumococcal vaccine shot. If you have had one before, ask your doctor whether you need another dose. Get a flu shot every year.  If you must be around people with colds or flu, wash your hands often. · Do not smoke. If you need help quitting, talk to your doctor about stop-smoking programs and medicines. These can increase your chances of quitting for good. When should you call for help? Call 911 anytime you think you may need emergency care. For example, call if:  · You passed out (lost consciousness). · You vomit blood or what looks like coffee grounds. · You pass maroon or very bloody stools. · You have a fever that does not go away or goes higher. · You are very short of breath. Call your doctor now or seek immediate medical care if:  · You have any unusual bleeding, such as:  ¨ Blood spots under the skin. ¨ A nosebleed that you cannot stop. ¨ Bleeding gums when you brush your teeth. ¨ Blood in your urine. ¨ Vaginal bleeding when you are not having your period, or heavy period bleeding. · You are dizzy or lightheaded, or you feel like you may faint. · Your stools are black and tarlike or have streaks of blood. · You have signs of needing more fluids. You have sunken eyes and a dry mouth, and you pass only a little dark urine. · Vomiting has lasted longer than 24 hours and you are not able to keep fluids down. · You get a sudden, severe headache or stiff neck. · You have belly pain, or existing belly pain gets worse. · You have severe diarrhea (large, loose bowel movements every 1 to 2 hours). · You have joint pain or swelling. · You are confused or have trouble thinking clearly. Watch closely for changes in your health, and be sure to contact your doctor if:  · You feel much more tired than usual.  · You have weakness that is getting worse. · You feel sad or anxious for a long period of time, or your feelings interfere with your normal activities or relationships. Where can you learn more? Go to http://gayle-shai.info/.   Enter C289 in the search box to learn more about \"Chronic Myelogenous Leukemia: Care Instructions. \"  Current as of: July 26, 2016  Content Version: 11.3  © 9762-0279 [employer]. Care instructions adapted under license by [employer] (which disclaims liability or warranty for this information). If you have questions about a medical condition or this instruction, always ask your healthcare professional. [place] [address] any warranty or liability for your use of this information.

## 2017-09-29 ENCOUNTER — HOSPITAL ENCOUNTER (OUTPATIENT)
Dept: INFUSION THERAPY | Age: 42
Discharge: HOME | End: 2017-09-29
Payer: COMMERCIAL

## 2017-10-11 RX ORDER — IMATINIB MESYLATE 400 MG/1
TABLET ORAL
Qty: 30 TAB | Refills: 6 | Status: SHIPPED | OUTPATIENT
Start: 2017-10-11 | End: 2018-05-17 | Stop reason: SDUPTHER

## 2017-12-15 ENCOUNTER — OFFICE VISIT (OUTPATIENT)
Dept: ONCOLOGY | Age: 42
End: 2017-12-15

## 2017-12-15 ENCOUNTER — HOSPITAL ENCOUNTER (OUTPATIENT)
Dept: ONCOLOGY | Age: 42
Discharge: HOME | End: 2017-12-15

## 2017-12-15 VITALS
TEMPERATURE: 98.2 F | WEIGHT: 272 LBS | BODY MASS INDEX: 29.88 KG/M2 | DIASTOLIC BLOOD PRESSURE: 80 MMHG | HEART RATE: 70 BPM | SYSTOLIC BLOOD PRESSURE: 137 MMHG

## 2017-12-15 DIAGNOSIS — C92.10 CML (CHRONIC MYELOCYTIC LEUKEMIA) (HCC): Primary | ICD-10-CM

## 2017-12-15 DIAGNOSIS — T45.1X5A CHEMOTHERAPY-INDUCED THROMBOCYTOPENIA: ICD-10-CM

## 2017-12-15 DIAGNOSIS — D64.81 ANTINEOPLASTIC CHEMOTHERAPY INDUCED ANEMIA: ICD-10-CM

## 2017-12-15 DIAGNOSIS — D69.59 CHEMOTHERAPY-INDUCED THROMBOCYTOPENIA: ICD-10-CM

## 2017-12-15 DIAGNOSIS — C92.10 CML (CHRONIC MYELOCYTIC LEUKEMIA) (HCC): ICD-10-CM

## 2017-12-15 DIAGNOSIS — T45.1X5A ANTINEOPLASTIC CHEMOTHERAPY INDUCED ANEMIA: ICD-10-CM

## 2017-12-15 DIAGNOSIS — D72.829 LEUKOCYTOSIS, UNSPECIFIED TYPE: ICD-10-CM

## 2017-12-15 DIAGNOSIS — D47.1 MYELOPROLIFERATIVE DISORDER (HCC): ICD-10-CM

## 2017-12-15 LAB
BASO+EOS+MONOS # BLD AUTO: 0.6 K/UL (ref 0–2.3)
BASO+EOS+MONOS # BLD AUTO: 7 % (ref 0.1–17)
DIFFERENTIAL METHOD BLD: ABNORMAL
ERYTHROCYTE [DISTWIDTH] IN BLOOD BY AUTOMATED COUNT: 15.3 % (ref 11.5–14.5)
HCT VFR BLD AUTO: 41.7 % (ref 36–48)
HGB BLD-MCNC: 14.1 G/DL (ref 12–16)
LYMPHOCYTES # BLD: 2.3 K/UL (ref 1.1–5.9)
LYMPHOCYTES NFR BLD: 24 % (ref 14–44)
MCH RBC QN AUTO: 31.1 PG (ref 25–35)
MCHC RBC AUTO-ENTMCNC: 33.8 G/DL (ref 31–37)
MCV RBC AUTO: 91.9 FL (ref 78–102)
NEUTS SEG # BLD: 6.4 K/UL (ref 1.8–9.5)
NEUTS SEG NFR BLD: 69 % (ref 40–70)
PLATELET # BLD AUTO: 108 K/UL (ref 140–440)
RBC # BLD AUTO: 4.54 M/UL (ref 4.1–5.1)
WBC # BLD AUTO: 9.3 K/UL (ref 4.5–13)

## 2017-12-15 NOTE — MR AVS SNAPSHOT
Visit Information Date & Time Provider Department Dept. Phone Encounter #  
 12/15/2017 10:00 AM Guanaco Aguilar MD [facility] Office [phone] [id number] Follow-up Instructions Return in about 8 weeks (around 2/9/2018). Your Appointments 2/16/2018 12:00 PM  
Office Visit with Guanaco Aguilar MD  
[place] [address] [address] [facility]) Appt Note: 8 wk fu  
 [place] [address]  
[phone]  
  
   
 [address] Upcoming Health Maintenance Date Due Pneumococcal 19-64 Highest Risk (1 of 3 - PCV13) 10/4/1993 DTaP/Tdap/Td series (1 - Tdap) 10/4/1995 Influenza Age 5 to Adult 8/1/2017 Allergies as of 12/15/2017  Review Complete On: 9/15/2017 By: Anita David RN No Known Allergies Current Immunizations  Reviewed on 9/15/2017 No immunizations on file. Not reviewed this visit You Were Diagnosed With   
  
 Codes Comments [facility]    -  Primary ICD-10-CM: C92.10 ICD-9-CM: 205.10 Myeloproliferative disorder ([facility]     ICD-10-CM: D47.1 ICD-9-CM: 238.79 Antineoplastic chemotherapy induced anemia     ICD-10-CM: D64.81, T45.1X5A 
ICD-9-CM: 285.3, E933.1 Leukocytosis, unspecified type     ICD-10-CM: D72.829 ICD-9-CM: 288.60 Chemotherapy-induced thrombocytopenia     ICD-10-CM: D69.59, T45.1X5A 
ICD-9-CM: 287.49, E933.1 Vitals BP Pulse Temp Weight(growth percentile) BMI Smoking Status 137/80 (BP 1 Location: Left arm, BP Patient Position: Sitting) 70 98.2 °F (36.8 °C) (Oral) 272 lb (123.4 kg) 29.88 kg/m2 Current Every Day Smoker BMI and BSA Data Body Mass Index Body Surface Area  
 29.88 kg/m 2 2.64 m 2 Preferred Pharmacy Pharmacy Name Phone [address] [phone] Your Updated Medication List  
  
   
 This list is accurate as of: 12/15/17 11:09 AM.  Always use your most recent med list.  
  
  
  
  
 allopurinol 100 mg tablet Commonly known as:  Melissa Hassan Take 3 Tabs by mouth daily. ferrous sulfate 325 mg (65 mg iron) tablet Take 1 Tab by mouth daily (with breakfast). GLEEVEC 400 mg tablet Generic drug:  imatinib TAKE ONE TABLET BY MOUTH DAILY HYDROcodone-acetaminophen 5-325 mg per tablet Commonly known as:  Ló[place]unt Take 1 Tab by mouth every four (4) hours as needed for Pain. Max Daily Amount: 6 Tabs. pseudoephedrine 30 mg tablet Commonly known as:  SUDAFED Take 1 Tab by mouth every four (4) hours as needed for Congestion. We Performed the Following COMPLETE CBC & AUTO DIFF WBC [67945 CPT(R)] Follow-up Instructions Return in about 8 weeks (around 2/9/2018). To-Do List   
 12/15/2017 Lab:  BCR-ABL1, PCR, QT   
  
 12/15/2017 Lab:  CBC WITH 3 PART DIFF   
  
 12/15/2017 Lab:  FERRITIN   
  
 12/15/2017 Lab:  IRON PROFILE   
  
 12/15/2017 Lab:  METABOLIC PANEL, COMPREHENSIVE Patient Instructions Chronic Myelogenous Leukemia: Care Instructions Your Care Instructions Leukemia is a type of cancer that causes your body to make too many blood cells, especially white blood cells. White blood cells are a part of your immune system, which helps protect you from infection and disease. In chronic myelogenous leukemia (CML), large numbers of white blood cells are made by the bone marrow. Over time, these cells may not work as they should, and they may cause symptoms as they begin to crowd out healthy white blood cells and other parts of your blood. But chronic leukemia gets worse slowly, and you may have few or no symptoms for months or years.  It is often discovered during a routine blood test. 
There are many treatments for CML, including chemotherapy and targeted therapy. A healthy diet, exercise, extra rest, and a strong support system can help you feel better. Many people also find that getting counseling or joining a support group helps them cope with their illness. When you find out that you have cancer, you may feel many emotions and may need some help coping. Seek out family, friends, and counselors for support. You also can do things at home to make yourself feel better while you go through treatment. Call the [employer] (7-110.319.1587) or visit its website at [address]. [employer] for more information. Follow-up care is a key part of your treatment and safety. Be sure to make and go to all appointments, and call your doctor if you are having problems. It's also a good idea to know your test results and keep a list of the medicines you take. How can you care for yourself at home? · Take your medicines exactly as prescribed. You may get medicine for nausea, vomiting, or pain (although leukemia rarely causes pain). Call your doctor if you think you are having a problem with your medicine. You will get more details on the specific medicines your doctor prescribes. · Eat healthy food. If you do not feel like eating, try to eat food that has protein and extra calories to keep up your strength and prevent weight loss. Drink liquid meal replacements for extra calories and protein. Try to eat your main meal early. · Get some physical activity every day, but do not get too tired. Keep doing the hobbies you enjoy as your energy allows. · Take steps to control your stress and workload. Learn relaxation techniques. ¨ Share your feelings. Stress and tension affect our emotions. By expressing your feelings to others, you may be able to understand and cope with them. ¨ Consider joining a support group. Talking about a problem with your spouse, a good friend, or other people with similar problems is a good way to reduce tension and stress. ¨ Express yourself through art. Try writing, dance, art, or crafts to relieve tension. Some dance, writing, or art groups may be available just for people who have cancer. ¨ Be kind to your body and mind. Getting enough sleep, eating a nutritious diet, and taking time to do things you enjoy can contribute to an overall feeling of balance in your life and help reduce stress. ¨ Get help if you need it. Discuss your concerns with your doctor or counselor. · If you are vomiting or have diarrhea: ¨ Drink plenty of fluids (enough so that your urine is light yellow or clear like water) to prevent dehydration. Choose water and other caffeine-free clear liquids. If you have kidney, heart, or liver disease and have to limit fluids, talk with your doctor before you increase the amount of fluids you drink. ¨ When you are able to eat, try clear soups, mild foods, and liquids until all symptoms are gone for 12 to 48 hours. Other good choices include dry toast, crackers, cooked cereal, and gelatin dessert, such as Jell-O. · Avoid colds and flu. Get a pneumococcal vaccine shot. If you have had one before, ask your doctor whether you need another dose. Get a flu shot every year. If you must be around people with colds or flu, wash your hands often. · Do not smoke. If you need help quitting, talk to your doctor about stop-smoking programs and medicines. These can increase your chances of quitting for good. When should you call for help? Call 911 anytime you think you may need emergency care. For example, call if: 
· You passed out (lost consciousness). Call your doctor now or seek immediate medical care if: 
· You have a fever. · You have abnormal bleeding. · You think you have an infection. · You have new or worse pain. · You have new symptoms, such as a cough, belly pain, vomiting, diarrhea, or a rash. Watch closely for changes in your health, and be sure to contact your doctor if: · You are much more tired than usual. 
· You have swollen glands in your armpits, groin, or neck. · You do not get better as expected. Where can you learn more? Go to http://gayle-shai.info/. Enter C276 in the search box to learn more about \"Chronic Myelogenous Leukemia: Care Instructions. \" Current as of: May 12, 2017 Content Version: 11.4 © 7222-3053 [employer]). Care instructions adapted under license by [employer] (which disclaims liability or warranty for this information). If you have questions about a medical condition or this instruction, always ask your healthcare professional. [address] any warranty or liability for your use of this information. Please provide this summary of care documentation to your next provider. Your primary care clinician is listed as [address] [place] [address]. If you have any questions after today's visit, please call [phone].

## 2017-12-15 NOTE — PATIENT INSTRUCTIONS
Chronic Myelogenous Leukemia: Care Instructions  Your Care Instructions    Leukemia is a type of cancer that causes your body to make too many blood cells, especially white blood cells. White blood cells are a part of your immune system, which helps protect you from infection and disease. In chronic myelogenous leukemia (CML), large numbers of white blood cells are made by the bone marrow. Over time, these cells may not work as they should, and they may cause symptoms as they begin to crowd out healthy white blood cells and other parts of your blood. But chronic leukemia gets worse slowly, and you may have few or no symptoms for months or years. It is often discovered during a routine blood test.  There are many treatments for CML, including chemotherapy and targeted therapy. A healthy diet, exercise, extra rest, and a strong support system can help you feel better. Many people also find that getting counseling or joining a support group helps them cope with their illness. When you find out that you have cancer, you may feel many emotions and may need some help coping. Seek out family, friends, and counselors for support. You also can do things at home to make yourself feel better while you go through treatment. Call the [employer] (1-188.635.1528) or visit its website at [address] ZoomInfo for more information. Follow-up care is a key part of your treatment and safety. Be sure to make and go to all appointments, and call your doctor if you are having problems. It's also a good idea to know your test results and keep a list of the medicines you take. How can you care for yourself at home? · Take your medicines exactly as prescribed. You may get medicine for nausea, vomiting, or pain (although leukemia rarely causes pain). Call your doctor if you think you are having a problem with your medicine. You will get more details on the specific medicines your doctor prescribes. · Eat healthy food.  If you do not feel like eating, try to eat food that has protein and extra calories to keep up your strength and prevent weight loss. Drink liquid meal replacements for extra calories and protein. Try to eat your main meal early. · Get some physical activity every day, but do not get too tired. Keep doing the hobbies you enjoy as your energy allows. · Take steps to control your stress and workload. Learn relaxation techniques. ¨ Share your feelings. Stress and tension affect our emotions. By expressing your feelings to others, you may be able to understand and cope with them. ¨ Consider joining a support group. Talking about a problem with your spouse, a good friend, or other people with similar problems is a good way to reduce tension and stress. ¨ Express yourself through art. Try writing, dance, art, or crafts to relieve tension. Some dance, writing, or art groups may be available just for people who have cancer. ¨ Be kind to your body and mind. Getting enough sleep, eating a nutritious diet, and taking time to do things you enjoy can contribute to an overall feeling of balance in your life and help reduce stress. ¨ Get help if you need it. Discuss your concerns with your doctor or counselor. · If you are vomiting or have diarrhea:  ¨ Drink plenty of fluids (enough so that your urine is light yellow or clear like water) to prevent dehydration. Choose water and other caffeine-free clear liquids. If you have kidney, heart, or liver disease and have to limit fluids, talk with your doctor before you increase the amount of fluids you drink. ¨ When you are able to eat, try clear soups, mild foods, and liquids until all symptoms are gone for 12 to 48 hours. Other good choices include dry toast, crackers, cooked cereal, and gelatin dessert, such as Jell-O.  · Avoid colds and flu. Get a pneumococcal vaccine shot. If you have had one before, ask your doctor whether you need another dose. Get a flu shot every year.  If you must be around people with colds or flu, wash your hands often. · Do not smoke. If you need help quitting, talk to your doctor about stop-smoking programs and medicines. These can increase your chances of quitting for good. When should you call for help? Call 911 anytime you think you may need emergency care. For example, call if:  · You passed out (lost consciousness). Call your doctor now or seek immediate medical care if:  · You have a fever. · You have abnormal bleeding. · You think you have an infection. · You have new or worse pain. · You have new symptoms, such as a cough, belly pain, vomiting, diarrhea, or a rash. Watch closely for changes in your health, and be sure to contact your doctor if:  · You are much more tired than usual.  · You have swollen glands in your armpits, groin, or neck. · You do not get better as expected. Where can you learn more? Go to http://gayle-shai.info/. Enter C276 in the search box to learn more about \"Chronic Myelogenous Leukemia: Care Instructions. \"  Current as of: May 12, 2017  Content Version: 11.4  © 6552-4204 Healthwise, [employer]. Care instructions adapted under license by [employer] (which disclaims liability or warranty for this information). If you have questions about a medical condition or this instruction, always ask your healthcare professional. [address] any warranty or liability for your use of this information.

## 2017-12-15 NOTE — PROGRESS NOTES
Hematology/Oncology  Progress Note    Name: Sole Mendieta  Date: 12/15/2017  : 1975    [place], [place]     Mr. Ananth Sutton is a 36y.o. year old male who was seen for newly diagnosed CML    Current Therapy: Gleevec 400mg PO daily      Subjective:     Mr. Ananth Sutton is a 27-year-old -American man who was recently diagnosed with CML after presenting to the emergency room with a swollen left knee. He does have a history of gout. He had arthrocentesis with removal of fluid from the knee. However his CBC revealed that he had a WBC count of about 392,000. The peripheral smear was concerning for possible myeloproliferative disorder. 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 He has a very high tumor burden and he also has extensive hepatomegaly. He was started on Gleevec 400 mg PO daily as well as Allopurinol at a dose of 300 mg daily. He reports he feels well. He has no complaints or concerns to report. He denies abdominal pain or discomfort. Denies dizziness, headaches, fevers. His appetite and energy level are reasonably well. He has no complaints of pain. His  and GI habits are normal.  The patient reports that his appetite and energy level are continuing to improve. He continues to tolerate the Gleevec without untoward side effects. Initially during the course of his therapy he developed some therapy induced anemia. He was instructed to begin taking an oral iron supplement daily. Today he reports that he has been compliant with taking the iron therapy and reports that his energy level has continued to improve. Past medical history, family history, and social history: these were reviewed and remains unchanged.     Past Medical History:   Diagnosis Date    Anemia     Arthritis     Right knee    [facility] myelocytic [facility]) ([facility] [address]     STD (sexually transmitted disease)      Past Surgical History:   Procedure Laterality Date    HX KNEE ARTHROSCOPY       Social History     Social History    Marital status: UNKNOWN     Spouse name: N/A    Number of children: N/A    Years of education: N/A     Occupational History    Not on file. Social History Main Topics    Smoking status: Current Every Day Smoker     Packs/day: 0.25     Years: 8.00     Types: Cigarettes    Smokeless tobacco: Never Used    Alcohol use No    Drug use: No    Sexual activity: Not on file     Other Topics Concern    Not on file     Social History Narrative     Family History   Problem Relation Age of Onset    Hypertension Maternal Grandmother      Current Outpatient Prescriptions   Medication Sig Dispense Refill    GLEEVEC 400 mg tablet TAKE ONE TABLET BY MOUTH DAILY 30 Tab 6    pseudoephedrine (SUDAFED) 30 mg tablet Take 1 Tab by mouth every four (4) hours as needed for Congestion. 16 Tab 0    HYDROcodone-acetaminophen (NORCO) 5-325 mg per tablet Take 1 Tab by mouth every four (4) hours as needed for Pain. Max Daily Amount: 6 Tabs. 30 Tab 0    allopurinol (ZYLOPRIM) 100 mg tablet Take 3 Tabs by mouth daily. 30 Tab 3    ferrous sulfate 325 mg (65 mg iron) tablet Take 1 Tab by mouth daily (with breakfast). 30 Tab 3       Review of Systems  Constitutional: The patient has no acute distress or discomfort. HEENT: The patient denies recent head trauma, eye pain, blurred vision,  hearing deficit, oropharyngeal mucosal pain or lesions, and the patient denies throat pain or discomfort. Lymphatics: The patient denies palpable peripheral lymphadenopathy. Hematologic: The patient denies having bruising, bleeding, or progressive fatigue. Respiratory: Patient denies having shortness of breath, cough, sputum production, fever, or dyspnea on exertion. Cardiovascular: The patient denies having leg pain, leg swelling, heart palpitations, chest permit, chest pain, or lightheadedness. The patient denies having dyspnea on exertion.   Gastrointestinal: The patient denies having nausea, emesis, or diarrhea. The patient denies having any hematemesis or blood in the stool. Genitourinary: Patient denies having urinary urgency, frequency, or dysuria. The patient denies having blood in the urine. Psychological: The patient denies having symptoms of nervousness, anxiety, depression, or thoughts of harming himself some of this. Skin: Patient denies having skin rashes, skin, ulcerations, or unexplained itching or pruritus. Musculoskeletal: The patient denies having pain in the joints or bones. Objective:     Visit Vitals    /80 (BP 1 Location: Left arm, BP Patient Position: Sitting)    Pulse 70    Temp 98.2 °F (36.8 °C) (Oral)    Wt 123.4 kg (272 lb)    BMI 29.88 kg/m2     ECOG PS 0  Physical Exam:   Gen. Appearance: The patient is in no acute distress. Skin: There is no bruise or rash. HEENT: The exam is unremarkable. Neck: Supple without lymphadenopathy or thyromegaly. Lungs: Clear to auscultation and percussion; there are no wheezes or rhonchi. Heart: Regular rate and rhythm; there are no murmurs, gallops, or rubs. Abdomen: Bowel sounds are present and normal.  There is no guarding, tenderness, or hepatosplenomegaly. Extremities: There is no clubbing, cyanosis, or edema. Neurologic: There are no focal neurologic deficits. Lymphatics: There is no palpable peripheral lymphadenopathy. Musculoskeletal: The patient has full range of motion at all joints. There is no evidence of joint deformity or effusions. There is no focal joint tenderness. Psychological/psychiatric: There is no clinical evidence of anxiety, depression, or melancholy.     Lab data:      Results for orders placed or performed during the hospital encounter of 12/15/17   CBC WITH 3 PART DIFF     Status: Abnormal   Result Value Ref Range Status    WBC 9.3 4.5 - 13.0 K/uL Final    RBC 4.54 4.10 - 5.10 M/uL Final    HGB 14.1 12.0 - 16.0 g/dL Final    HCT 41.7 36 - 48 % Final MCV 91.9 78 - 102 FL Final    MCH 31.1 25.0 - 35.0 PG Final    MCHC 33.8 31 - 37 g/dL Final    RDW 15.3 (H) 11.5 - 14.5 % Final    PLATELET 822 (L) 346 - 440 K/uL Final    NEUTROPHILS 69 40 - 70 % Final    MIXED CELLS 7 0.1 - 17 % Final    LYMPHOCYTES 24 14 - 44 % Final    ABS. NEUTROPHILS 6.4 1.8 - 9.5 K/UL Final    ABS. MIXED CELLS 0.6 0.0 - 2.3 K/uL Final    ABS. LYMPHOCYTES 2.3 1.1 - 5.9 K/UL Final     Comment: Test performed at [address]. Results Reviewed by Medical Director. DF AUTOMATED   Final           Assessment:     1. CML (chronic myelocytic leukemia) ([facility]    2. Myeloproliferative disorder ([facility]    3. Antineoplastic chemotherapy induced anemia    4. Leukocytosis, unspecified type    5. Chemotherapy-induced thrombocytopenia          Plan:   CML/myeloproliferative disorder: The CBC from today shows that his WBC counts are now normal at 9.3. The absolute neutrophil count is normal at 6.4 with an absolute lymphocyte count of 2.3. He will continue Gleevec 400mg PO daily. I will check a BCR-ABL1 level at this time as well as a CMP. Chemotherapy-induced anemia: At this time the CBC shows that his hemoglobin is 14.1 g/dL with hematocrit of 41.7 %. I will check his iron profile and ferritin levels at this time. I have explained to the patient that systemic treatment with Procrit at a dose of 60,000 units subcutaneous every 2 weeks is reserved for hemoglobin level is below 10 g/dL with hematocrit levels below 30%. Leukocytosis: Extensive leukocytosis. His WBC count today is 9.3 and his neutrophil count of 69% % and absolute neutrophil count of 6.4. I explained to the patient that all these numbers are within normal limits. Chemotherapy-induced thrombocytopenia: I have explained to the patient that his current platelet count is 101,696. Therapeutic intervention is not warranted unless the platelet count declined below 30,000.   We will continue to monitor this at 8 week intervals. I plan to see the patient in 2 months or sooner if indicated.     Orders Placed This Encounter    COMPLETE CBC & AUTO DIFF WBC    InHouse CBC ([facility])     Standing Status:   Future     Number of Occurrences:   1     Standing Expiration Date:   31/06/2435    METABOLIC PANEL, COMPREHENSIVE     Standing Status:   Future     Number of Occurrences:   1     Standing Expiration Date:   12/16/2018    IRON PROFILE     Standing Status:   Future     Number of Occurrences:   1     Standing Expiration Date:   12/16/2018    FERRITIN     Standing Status:   Future     Number of Occurrences:   1     Standing Expiration Date:   12/16/2018    BCR-ABL1, PCR, QT     Standing Status:   Future     Number of Occurrences:   1     Standing Expiration Date:   12/16/2018       Miguelina Castillo MD  12/15/2017

## 2017-12-16 LAB
A-G RATIO,AGRAT: 1.4 RATIO (ref 1.1–2.6)
ALBUMIN SERPL-MCNC: 4.1 G/DL (ref 3.5–5)
ALP SERPL-CCNC: 85 U/L (ref 25–115)
ALT SERPL-CCNC: 35 U/L (ref 5–40)
ANION GAP SERPL CALC-SCNC: 14 MMOL/L
AST SERPL W P-5'-P-CCNC: 28 U/L (ref 10–37)
BILIRUB SERPL-MCNC: 0.4 MG/DL (ref 0.2–1.2)
BUN SERPL-MCNC: 14 MG/DL (ref 6–22)
CALCIUM SERPL-MCNC: 8.7 MG/DL (ref 8.4–10.4)
CHLORIDE SERPL-SCNC: 103 MMOL/L (ref 98–110)
CO2 SERPL-SCNC: 27 MMOL/L (ref 20–32)
CREAT SERPL-MCNC: 1.5 MG/DL (ref 0.5–1.2)
FE % SATURATION,PSAT: 38 % (ref 20–50)
FERRITIN SERPL-MCNC: 219 NG/ML (ref 22–322)
GFRAA, 66117: >60
GFRNA, 66118: 49.9
GLOBULIN,GLOB: 2.9 G/DL (ref 2–4)
GLUCOSE SERPL-MCNC: 98 MG/DL (ref 70–99)
IRON,IRN: 98 MCG/DL (ref 45–160)
POTASSIUM SERPL-SCNC: 4.3 MMOL/L (ref 3.5–5.5)
PROT SERPL-MCNC: 7 G/DL (ref 6.4–8.3)
SODIUM SERPL-SCNC: 144 MMOL/L (ref 133–145)
TIBC,TIBC: 259 MCG/DL (ref 228–428)
UIBC SERPL-MCNC: 161 MCG/DL (ref 110–370)

## 2017-12-23 LAB
B2B2 TRANSCRIPT: 135.39 %
B3B2 TRANSCRIPT: 47.87 %
BACKGROUND BCR-ABL: ABNORMAL
DIRECTOR REVIEW BCR-ABL: ABNORMAL
E1A2 TRANSCRIPT 480502: ABNORMAL %
INTERPRETATION BCR-ABL: ABNORMAL
METHODOLOGY BCR-ABL1: ABNORMAL

## 2018-03-16 ENCOUNTER — HOSPITAL ENCOUNTER (OUTPATIENT)
Dept: ONCOLOGY | Age: 43
Discharge: HOME | End: 2018-03-16

## 2018-03-16 ENCOUNTER — OFFICE VISIT (OUTPATIENT)
Dept: ONCOLOGY | Age: 43
End: 2018-03-16

## 2018-03-16 VITALS
WEIGHT: 286 LBS | HEART RATE: 53 BPM | DIASTOLIC BLOOD PRESSURE: 78 MMHG | SYSTOLIC BLOOD PRESSURE: 135 MMHG | BODY MASS INDEX: 31.42 KG/M2 | TEMPERATURE: 98.2 F

## 2018-03-16 DIAGNOSIS — D47.1 MYELOPROLIFERATIVE DISORDER (HCC): ICD-10-CM

## 2018-03-16 DIAGNOSIS — D69.59 CHEMOTHERAPY-INDUCED THROMBOCYTOPENIA: ICD-10-CM

## 2018-03-16 DIAGNOSIS — D64.81 ANTINEOPLASTIC CHEMOTHERAPY INDUCED ANEMIA: ICD-10-CM

## 2018-03-16 DIAGNOSIS — C92.10 CML (CHRONIC MYELOCYTIC LEUKEMIA) (HCC): ICD-10-CM

## 2018-03-16 DIAGNOSIS — T45.1X5A CHEMOTHERAPY-INDUCED THROMBOCYTOPENIA: ICD-10-CM

## 2018-03-16 DIAGNOSIS — T45.1X5A ANTINEOPLASTIC CHEMOTHERAPY INDUCED ANEMIA: ICD-10-CM

## 2018-03-16 DIAGNOSIS — C92.10 CML (CHRONIC MYELOCYTIC LEUKEMIA) (HCC): Primary | ICD-10-CM

## 2018-03-16 LAB
BASO+EOS+MONOS # BLD AUTO: 0.6 K/UL (ref 0–2.3)
BASO+EOS+MONOS # BLD AUTO: 7 % (ref 0.1–17)
DIFFERENTIAL METHOD BLD: ABNORMAL
ERYTHROCYTE [DISTWIDTH] IN BLOOD BY AUTOMATED COUNT: 15.1 % (ref 11.5–14.5)
HCT VFR BLD AUTO: 39.9 % (ref 36–48)
HGB BLD-MCNC: 13.7 G/DL (ref 12–16)
LYMPHOCYTES # BLD: 2.4 K/UL (ref 1.1–5.9)
LYMPHOCYTES NFR BLD: 30 % (ref 14–44)
MCH RBC QN AUTO: 32.9 PG (ref 25–35)
MCHC RBC AUTO-ENTMCNC: 34.3 G/DL (ref 31–37)
MCV RBC AUTO: 95.7 FL (ref 78–102)
NEUTS SEG # BLD: 4.8 K/UL (ref 1.8–9.5)
NEUTS SEG NFR BLD: 63 % (ref 40–70)
PLATELET # BLD AUTO: 124 K/UL (ref 140–440)
RBC # BLD AUTO: 4.17 M/UL (ref 4.1–5.1)
WBC # BLD AUTO: 7.8 K/UL (ref 4.5–13)

## 2018-03-16 NOTE — MR AVS SNAPSHOT
[address] [facility] [address] Located within [facility] [address] 
[phone] Patient: Vidya Jordan MRN: [id number] :1975 Visit Information Date & Time Provider Department Dept. Phone Encounter #  
 3/16/2018  2:30 PM [facility] Office [phone] [id number] Follow-up Instructions Return in about 8 weeks (around 2018). Your Appointments 5/15/2018  3:15 PM  
Office Visit with Mikayla Hinojosa MD  
[facility] Appt Note: [facility] [address] Located within [facility] [address]  
[phone]  
  
   
 [facility] [address] Upcoming Health Maintenance Date Due Pneumococcal 19-64 Highest Risk (1 of 3 - PCV13) 10/4/1993 DTaP/Tdap/Td series (1 - Tdap) 10/4/1995 Influenza Age 5 to Adult 2017 Allergies as of 3/16/2018  Review Complete On: 3/16/2018 By: Mikayla Hinojosa MD  
 No Known Allergies Current Immunizations  Reviewed on 9/15/2017 No immunizations on file. Not reviewed this visit You Were Diagnosed With   
  
 Codes Comments CML (chronic myelocytic leukemia) ([facility] [address]    -  Primary ICD-10-CM: C92.10 ICD-9-CM: 205.10 Myeloproliferative disorder ([facility]     ICD-10-CM: D47.1 ICD-9-CM: 238.79 Chemotherapy-induced thrombocytopenia     ICD-10-CM: D69.59, T45.1X5A 
ICD-9-CM: 287.49, E933.1 Antineoplastic chemotherapy induced anemia     ICD-10-CM: D64.81, T45.1X5A 
ICD-9-CM: 285.3, E933.1 Vitals BP Pulse Temp Weight(growth percentile) BMI Smoking Status 135/78 (!) 53 98.2 °F (36.8 °C) (Oral) 286 lb (129.7 kg) 31.42 kg/m2 Current Every Day Smoker BMI and BSA Data Body Mass Index Body Surface Area  
 31.42 kg/m 2 2.71 m 2 Preferred Pharmacy Pharmacy Name Phone [address] [phone] Your Updated Medication List  
  
   
This list is accurate as of 3/16/18  3:15 PM.  Always use your most recent med list.  
  
  
  
  
 allopurinol 100 mg tablet Commonly known as:  Mortimer [place]ma Take 3 Tabs by mouth daily. ferrous sulfate 325 mg (65 mg iron) tablet Take 1 Tab by mouth daily (with breakfast). GLEEVEC 400 mg tablet Generic drug:  imatinib TAKE ONE TABLET BY MOUTH DAILY HYDROcodone-acetaminophen 5-325 mg per tablet Commonly known as:  Zilphia Deck Take 1 Tab by mouth every four (4) hours as needed for Pain. Max Daily Amount: 6 Tabs. pseudoephedrine 30 mg tablet Commonly known as:  SUDAFED Take 1 Tab by mouth every four (4) hours as needed for Congestion. We Performed the Following BCR-ABL1, PCR, QT [VIQ57715 Custom] COMPLETE CBC & AUTO DIFF WBC [52681 CPT(R)] FERRITIN [33909 CPT(R)] IRON PROFILE [id number] CPT(R)] METABOLIC PANEL, COMPREHENSIVE [83930 CPT(R)] Follow-up Instructions Return in about 8 weeks (around 5/11/2018). To-Do List   
 03/16/2018 Lab:  BCR-ABL1, PCR, QT   
  
 03/16/2018 Lab:  CBC WITH 3 PART DIFF   
  
 03/16/2018 Lab:  FERRITIN   
  
 03/16/2018 Lab:  IRON PROFILE   
  
 03/16/2018 Lab:  METABOLIC PANEL, COMPREHENSIVE Patient Instructions Complete Blood Count (CBC): About This Test 
What is it? A complete blood count (CBC) is a blood test that gives important information about your blood cells, especially red blood cells, white blood cells, and platelets. Why is this test done? A CBC may be done as part of a regular physical exam. There are many other reasons that a doctor may want this blood test, including to: · Find the cause of symptoms such as fatigue, weakness, fever, bruising, or weight loss. · Find anemia or an infection. · See how much blood has been lost if there is bleeding. · Diagnose diseases of the blood, such as leukemia or polycythemia. How can you prepare for the test? 
You do not need to do anything before having this test. 
What happens during the test? 
The health professional taking a sample of your blood will: · Wrap an elastic band around your upper arm. This makes the veins below the band larger so it is easier to put a needle into the vein. · Clean the needle site with alcohol. · Put the needle into the vein. · Attach a tube to the needle to fill it with blood. · Remove the band from your arm when enough blood is collected. · Put a gauze pad or cotton ball over the needle site as the needle is removed. · Put pressure on the site and then put on a bandage. If this blood test is done on a baby, a heel stick may be done instead of a blood draw from a vein. What happens after the test? 
· You will probably be able to go home right away. · You can go back to your usual activities right away. Follow-up care is a key part of your treatment and safety. Be sure to make and go to all appointments, and call your doctor if you are having problems. It's also a good idea to keep a list of the medicines you take. Ask your doctor when you can expect to have your test results. Where can you learn more? Go to http://gayle-shai.info/. Enter P414 in the search box to learn more about \"Complete Blood Count (CBC): About This Test.\" Current as of: October 14, 2016 Content Version: 11.4 © 1891-8801 [employer]. Care instructions adapted under license by [employer] (which disclaims liability or warranty for this information). If you have questions about a medical condition or this instruction, always ask your healthcare professional. [place] [address] any warranty or liability for your use of this information. Please provide this summary of care documentation to your next provider. Your primary care clinician is listed as [address] [place] [address]   If you have any questions after today's visit, please call [phone].

## 2018-03-16 NOTE — PATIENT INSTRUCTIONS
Complete Blood Count (CBC): About This Test  What is it? A complete blood count (CBC) is a blood test that gives important information about your blood cells, especially red blood cells, white blood cells, and platelets. Why is this test done? A CBC may be done as part of a regular physical exam. There are many other reasons that a doctor may want this blood test, including to:  · Find the cause of symptoms such as fatigue, weakness, fever, bruising, or weight loss. · Find anemia or an infection. · See how much blood has been lost if there is bleeding. · Diagnose diseases of the blood, such as leukemia or polycythemia. How can you prepare for the test?  You do not need to do anything before having this test.  What happens during the test?  The health professional taking a sample of your blood will:  · Wrap an elastic band around your upper arm. This makes the veins below the band larger so it is easier to put a needle into the vein. · Clean the needle site with alcohol. · Put the needle into the vein. · Attach a tube to the needle to fill it with blood. · Remove the band from your arm when enough blood is collected. · Put a gauze pad or cotton ball over the needle site as the needle is removed. · Put pressure on the site and then put on a bandage. If this blood test is done on a baby, a heel stick may be done instead of a blood draw from a vein. What happens after the test?  · You will probably be able to go home right away. · You can go back to your usual activities right away. Follow-up care is a key part of your treatment and safety. Be sure to make and go to all appointments, and call your doctor if you are having problems. It's also a good idea to keep a list of the medicines you take. Ask your doctor when you can expect to have your test results. Where can you learn more? Go to http://gayle-shai.info/.   Enter T854 in the search box to learn more about \"Complete Blood Count (CBC): About This Test.\"  Current as of: October 14, 2016  Content Version: 11.4  © 3660-4265 Healthwise, [employer]. Care instructions adapted under license by [employer] (which disclaims liability or warranty for this information). If you have questions about a medical condition or this instruction, always ask your healthcare professional. [place] [address] any warranty or liability for your use of this information.

## 2018-03-16 NOTE — PROGRESS NOTES
Hematology/Oncology  Progress Note    Name: Emily Cervantes  Date: 3/16/2018  : 1975    Donaldo Farias MD     Mr. Christiano Akers is a 36y.o. year old male who was seen for newly diagnosed CML    Current Therapy: Gleevec 400mg PO daily      Subjective:     Mr. Christiano Akers is a 58-year-old -American man who was recently diagnosed with CML after presenting to the emergency room with a swollen left knee. He does have a history of gout. He had arthrocentesis with removal of fluid from the knee. However his CBC revealed that he had a WBC count of about 392,000. The peripheral smear was concerning for possible myeloproliferative disorder. 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 He has a very high tumor burden and he also has extensive hepatomegaly. He was started on Gleevec 400 mg PO daily as well as Allopurinol at a dose of 300 mg daily. He reports he feels well. He has no complaints or concerns to report. He denies abdominal pain or discomfort. Denies dizziness, headaches, fevers. His appetite and energy level are reasonably well. He has no complaints of pain. His  and GI habits are normal.  The patient reports that his appetite and energy level are continuing to improve. He continues to tolerate the Gleevec without untoward side effects. Initially during the course of his therapy he developed some therapy induced anemia. He was instructed to continue taking an oral iron supplement daily. Today he reports that he has been compliant with taking the iron therapy and reports that his energy level has continued to improve. Past medical history, family history, and social history: these were reviewed and remains unchanged.     Past Medical History:   Diagnosis Date    Anemia     Arthritis     Right knee    CML (chronic myelocytic leukemia) ([facility] [address]     STD (sexually transmitted disease)      Past Surgical History:   Procedure Laterality Date    HX KNEE ARTHROSCOPY       Social History     Social History    Marital status: UNKNOWN     Spouse name: N/A    Number of children: N/A    Years of education: N/A     Occupational History    Not on file. Social History Main Topics    Smoking status: Current Every Day Smoker     Packs/day: 0.25     Years: 8.00     Types: Cigarettes    Smokeless tobacco: Never Used    Alcohol use No    Drug use: No    Sexual activity: Not on file     Other Topics Concern    Not on file     Social History Narrative     Family History   Problem Relation Age of Onset    Hypertension Maternal Grandmother      Current Outpatient Prescriptions   Medication Sig Dispense Refill    GLEEVEC 400 mg tablet TAKE ONE TABLET BY MOUTH DAILY 30 Tab 6    pseudoephedrine (SUDAFED) 30 mg tablet Take 1 Tab by mouth every four (4) hours as needed for Congestion. 16 Tab 0    HYDROcodone-acetaminophen (NORCO) 5-325 mg per tablet Take 1 Tab by mouth every four (4) hours as needed for Pain. Max Daily Amount: 6 Tabs. 30 Tab 0    allopurinol (ZYLOPRIM) 100 mg tablet Take 3 Tabs by mouth daily. 30 Tab 3    ferrous sulfate 325 mg (65 mg iron) tablet Take 1 Tab by mouth daily (with breakfast). 30 Tab 3       Review of Systems  Constitutional: The patient has no acute distress or discomfort. HEENT: The patient denies recent head trauma, eye pain, blurred vision,  hearing deficit, oropharyngeal mucosal pain or lesions, and the patient denies throat pain or discomfort. Lymphatics: The patient denies palpable peripheral lymphadenopathy. Hematologic: The patient denies having bruising, bleeding, or progressive fatigue. Respiratory: Patient denies having shortness of breath, cough, sputum production, fever, or dyspnea on exertion. Cardiovascular: The patient denies having leg pain, leg swelling, heart palpitations, chest permit, chest pain, or lightheadedness. The patient denies having dyspnea on exertion.   Gastrointestinal: The patient denies having nausea, emesis, or diarrhea. The patient denies having any hematemesis or blood in the stool. Genitourinary: Patient denies having urinary urgency, frequency, or dysuria. The patient denies having blood in the urine. Psychological: The patient denies having symptoms of nervousness, anxiety, depression, or thoughts of harming himself some of this. Skin: Patient denies having skin rashes, skin, ulcerations, or unexplained itching or pruritus. Musculoskeletal: The patient denies having pain in the joints or bones. Objective:     Visit Vitals    /78    Pulse (!) 53    Temp 98.2 °F (36.8 °C) (Oral)    Wt 129.7 kg (286 lb)    BMI 31.42 kg/m2     ECOG PS 0  Physical Exam:   Gen. Appearance: The patient is in no acute distress. Skin: There is no bruise or rash. HEENT: The exam is unremarkable. Neck: Supple without lymphadenopathy or thyromegaly. Lungs: Clear to auscultation and percussion; there are no wheezes or rhonchi. Heart: Regular rate and rhythm; there are no murmurs, gallops, or rubs. Abdomen: Bowel sounds are present and normal.  There is no guarding, tenderness, or hepatosplenomegaly. Extremities: There is no clubbing, cyanosis, or edema. Neurologic: There are no focal neurologic deficits. Lymphatics: There is no palpable peripheral lymphadenopathy. Musculoskeletal: The patient has full range of motion at all joints. There is no evidence of joint deformity or effusions. There is no focal joint tenderness. Psychological/psychiatric: There is no clinical evidence of anxiety, depression, or melancholy.     Lab data:      Results for orders placed or performed during the hospital encounter of 03/16/18   CBC WITH 3 PART DIFF     Status: Abnormal   Result Value Ref Range Status    WBC 7.8 4.5 - 13.0 K/uL Final    RBC 4.17 4.10 - 5.10 M/uL Final    HGB 13.7 12.0 - 16.0 g/dL Final    HCT 39.9 36 - 48 % Final    MCV 95.7 78 - 102 FL Final    MCH 32.9 25.0 - 35.0 PG Final    MCHC 34.3 31 - 37 g/dL Final    RDW 15.1 (H) 11.5 - 14.5 % Final    PLATELET 250 (L) 008 - 440 K/uL Final    NEUTROPHILS 63 40 - 70 % Final    MIXED CELLS 7 0.1 - 17 % Final    LYMPHOCYTES 30 14 - 44 % Final    ABS. NEUTROPHILS 4.8 1.8 - 9.5 K/UL Final    ABS. MIXED CELLS 0.6 0.0 - 2.3 K/uL Final    ABS. LYMPHOCYTES 2.4 1.1 - 5.9 K/UL Final     Comment: Test performed at [address]. Results Reviewed by Medical Director. DF AUTOMATED   Final           Assessment:     1. CML (chronic myelocytic leukemia) ([facility] [address]    2. Myeloproliferative disorder ([facility]    3. Chemotherapy-induced thrombocytopenia    4. Antineoplastic chemotherapy induced anemia          Plan:   CML/myeloproliferative disorder: The CBC from today shows that his WBC counts are now normal at 7.8. The absolute neutrophil count is normal at 4.8 with an absolute lymphocyte count of 2.4. He will continue Gleevec 400mg PO daily. I will check a BCR-ABL1 level at this time as well as a CMP. Chemotherapy-induced anemia: At this time the CBC shows that his hemoglobin is 13.7 g/dL with hematocrit of 39.9 %. I will check his iron profile and ferritin levels at this time. I have explained to the patient that systemic treatment with Procrit at a dose of 60,000 units subcutaneous every 2 weeks is reserved for hemoglobin level is below 10 g/dL with hematocrit levels below 30%. Leukocytosis: Extensive leukocytosis. His WBC count today is 7.8 and his neutrophil count of 63 % % and absolute neutrophil count of 4.8. I explained to the patient that all these numbers are within normal limits. Chemotherapy-induced thrombocytopenia: I have explained to the patient that his current platelet count is 071,030. Therapeutic intervention is not warranted unless the platelet count declined below 30,000. We will continue to monitor this at 8 week intervals. I plan to see the patient in 2 months or sooner if indicated.     Orders Placed This Encounter    COMPLETE CBC & AUTO DIFF WBC    InHouse CBC ([facility])     Standing Status:   Future     Number of Occurrences:   1     Standing Expiration Date:   8/71/7317    METABOLIC PANEL, COMPREHENSIVE     Standing Status:   Future     Number of Occurrences:   1     Standing Expiration Date:   3/17/2019    IRON PROFILE     Standing Status:   Future     Number of Occurrences:   1     Standing Expiration Date:   3/17/2019    FERRITIN     Standing Status:   Future     Number of Occurrences:   1     Standing Expiration Date:   3/17/2019    BCR-ABL1, PCR, QT     Standing Status:   Future     Number of Occurrences:   1     Standing Expiration Date:   3/17/2019       Babar Blackman MD  3/16/2018

## 2018-03-17 LAB
A-G RATIO,AGRAT: 1.9 RATIO (ref 1.1–2.6)
ALBUMIN SERPL-MCNC: 4.6 G/DL (ref 3.5–5)
ALP SERPL-CCNC: 80 U/L (ref 25–115)
ALT SERPL-CCNC: 28 U/L (ref 5–40)
ANION GAP SERPL CALC-SCNC: 13 MMOL/L
AST SERPL W P-5'-P-CCNC: 25 U/L (ref 10–37)
BILIRUB SERPL-MCNC: 0.3 MG/DL (ref 0.2–1.2)
BUN SERPL-MCNC: 13 MG/DL (ref 6–22)
CALCIUM SERPL-MCNC: 8.8 MG/DL (ref 8.4–10.4)
CHLORIDE SERPL-SCNC: 102 MMOL/L (ref 98–110)
CO2 SERPL-SCNC: 27 MMOL/L (ref 20–32)
CREAT SERPL-MCNC: 1.4 MG/DL (ref 0.5–1.2)
FE % SATURATION,PSAT: 40 % (ref 20–50)
FERRITIN SERPL-MCNC: 267 NG/ML (ref 22–322)
GFRAA, 66117: >60
GFRNA, 66118: 54
GLOBULIN,GLOB: 2.4 G/DL (ref 2–4)
GLUCOSE SERPL-MCNC: 87 MG/DL (ref 70–99)
IRON,IRN: 102 MCG/DL (ref 45–160)
POTASSIUM SERPL-SCNC: 4.1 MMOL/L (ref 3.5–5.5)
PROT SERPL-MCNC: 7 G/DL (ref 6.4–8.3)
SODIUM SERPL-SCNC: 142 MMOL/L (ref 133–145)
TIBC,TIBC: 254 MCG/DL (ref 228–428)
UIBC SERPL-MCNC: 152 MCG/DL (ref 110–370)

## 2018-03-22 LAB
B2B2 TRANSCRIPT: 26.67 %
B3B2 TRANSCRIPT: 10.33 %
BACKGROUND BCR-ABL: NORMAL
DIRECTOR REVIEW BCR-ABL: NORMAL
E1A2 TRANSCRIPT 480502: NORMAL %
INTERPRETATION BCR-ABL: NORMAL
METHODOLOGY BCR-ABL1: NORMAL

## 2018-05-17 RX ORDER — IMATINIB MESYLATE 400 MG/1
TABLET ORAL
Qty: 30 TAB | Refills: 6 | Status: SHIPPED | OUTPATIENT
Start: 2018-05-17 | End: 2019-01-01 | Stop reason: SDUPTHER

## 2018-05-25 ENCOUNTER — OFFICE VISIT (OUTPATIENT)
Dept: ONCOLOGY | Age: 43
End: 2018-05-25

## 2018-05-25 ENCOUNTER — HOSPITAL ENCOUNTER (OUTPATIENT)
Dept: ONCOLOGY | Age: 43
Discharge: HOME | End: 2018-05-25

## 2018-05-25 VITALS
HEART RATE: 79 BPM | DIASTOLIC BLOOD PRESSURE: 58 MMHG | WEIGHT: 299 LBS | SYSTOLIC BLOOD PRESSURE: 96 MMHG | TEMPERATURE: 97.6 F | BODY MASS INDEX: 32.85 KG/M2

## 2018-05-25 DIAGNOSIS — C92.10 CML (CHRONIC MYELOCYTIC LEUKEMIA) (HCC): Primary | ICD-10-CM

## 2018-05-25 DIAGNOSIS — D64.81 ANTINEOPLASTIC CHEMOTHERAPY INDUCED ANEMIA: ICD-10-CM

## 2018-05-25 DIAGNOSIS — T45.1X5A ANTINEOPLASTIC CHEMOTHERAPY INDUCED ANEMIA: ICD-10-CM

## 2018-05-25 DIAGNOSIS — D72.829 LEUKOCYTOSIS, UNSPECIFIED TYPE: ICD-10-CM

## 2018-05-25 DIAGNOSIS — T45.1X5A CHEMOTHERAPY-INDUCED THROMBOCYTOPENIA: ICD-10-CM

## 2018-05-25 DIAGNOSIS — D69.59 CHEMOTHERAPY-INDUCED THROMBOCYTOPENIA: ICD-10-CM

## 2018-05-25 DIAGNOSIS — C92.10 CML (CHRONIC MYELOCYTIC LEUKEMIA) (HCC): ICD-10-CM

## 2018-05-25 LAB
BASO+EOS+MONOS # BLD AUTO: 0.6 K/UL (ref 0–2.3)
BASO+EOS+MONOS # BLD AUTO: 6 % (ref 0.1–17)
DIFFERENTIAL METHOD BLD: ABNORMAL
ERYTHROCYTE [DISTWIDTH] IN BLOOD BY AUTOMATED COUNT: 13.9 % (ref 11.5–14.5)
HCT VFR BLD AUTO: 40.6 % (ref 36–48)
HGB BLD-MCNC: 14.1 G/DL (ref 12–16)
LYMPHOCYTES # BLD: 2.6 K/UL (ref 1.1–5.9)
LYMPHOCYTES NFR BLD: 27 % (ref 14–44)
MCH RBC QN AUTO: 33.3 PG (ref 25–35)
MCHC RBC AUTO-ENTMCNC: 34.7 G/DL (ref 31–37)
MCV RBC AUTO: 96 FL (ref 78–102)
NEUTS SEG # BLD: 6.6 K/UL (ref 1.8–9.5)
NEUTS SEG NFR BLD: 67 % (ref 40–70)
PLATELET # BLD AUTO: 122 K/UL (ref 140–440)
RBC # BLD AUTO: 4.23 M/UL (ref 4.1–5.1)
WBC # BLD AUTO: 9.8 K/UL (ref 4.5–13)

## 2018-05-25 NOTE — PATIENT INSTRUCTIONS
Complete Blood Count (CBC): About This Test  What is it? A complete blood count (CBC) is a blood test that gives important information about your blood cells, especially red blood cells, white blood cells, and platelets. Why is this test done? A CBC may be done as part of a regular physical exam. There are many other reasons that a doctor may want this blood test, including to:  · Find the cause of symptoms such as fatigue, weakness, fever, bruising, or weight loss. · Find anemia or an infection. · See how much blood has been lost if there is bleeding. · Diagnose diseases of the blood, such as leukemia or polycythemia. How can you prepare for the test?  You do not need to do anything before having this test.  What happens during the test?  The health professional taking a sample of your blood will:  · Wrap an elastic band around your upper arm. This makes the veins below the band larger so it is easier to put a needle into the vein. · Clean the needle site with alcohol. · Put the needle into the vein. · Attach a tube to the needle to fill it with blood. · Remove the band from your arm when enough blood is collected. · Put a gauze pad or cotton ball over the needle site as the needle is removed. · Put pressure on the site and then put on a bandage. If this blood test is done on a baby, a heel stick may be done instead of a blood draw from a vein. What happens after the test?  · You will probably be able to go home right away. · You can go back to your usual activities right away. Follow-up care is a key part of your treatment and safety. Be sure to make and go to all appointments, and call your doctor if you are having problems. It's also a good idea to keep a list of the medicines you take. Ask your doctor when you can expect to have your test results. Where can you learn more? Go to http://gayle-shai.info/.   Enter F184 in the search box to learn more about \"Complete Blood Count (CBC): About This Test.\"  Current as of: October 14, 2016  Content Version: 11.4  © 4704-4739 [employer]. Care instructions adapted under license by [employer] (which disclaims liability or warranty for this information). If you have questions about a medical condition or this instruction, always ask your healthcare professional. [place] [address] any warranty or liability for your use of this information.

## 2018-05-25 NOTE — PROGRESS NOTES
Hematology/Oncology  Progress Note    Name: Naren Ogden  Date: 2018  : 1975    Camille Martinez MD     Mr. Julian Rasmussen is a 36y.o. year old male who was seen for newly diagnosed CML    Current Therapy: Gleevec 400mg PO daily      Subjective:     Mr. Julian Rasmussen is a 58-year-old -American man who was recently diagnosed with CML. He was started on Gleevec 400 mg PO daily as well as Allopurinol at a dose of 300 mg daily. He reports he feels well. He has no complaints or concerns to report. He denies abdominal pain or discomfort. Denies dizziness, headaches, fevers. His appetite and energy level are reasonably well. He has no complaints of pain. His  and GI habits are normal.  The patient reports that his appetite and energy level are continuing to improve. He continues to tolerate the Gleevec without untoward side effects. Initially during the course of his therapy he developed some therapy induced anemia. He was instructed to continue taking an oral iron supplement daily. Today he reports that he has been compliant with taking the iron therapy and reports that his energy level has continued to improve. Past medical history, family history, and social history: these were reviewed and remains unchanged. Past Medical History:   Diagnosis Date    Anemia     Arthritis     Right knee    [facility] myelocytic [facility]) [facility] [address]     STD (sexually transmitted disease)      Past Surgical History:   Procedure Laterality Date    HX KNEE ARTHROSCOPY       Social History     Social History    Marital status: UNKNOWN     Spouse name: N/A    Number of children: N/A    Years of education: N/A     Occupational History    Not on file.      Social History Main Topics    Smoking status: Current Every Day Smoker     Packs/day: 0.25     Years: 8.00     Types: Cigarettes    Smokeless tobacco: Never Used    Alcohol use No    Drug use: No    Sexual activity: Not on file     Other Topics Concern    Not on file     Social History Narrative     Family History   Problem Relation Age of Onset    Hypertension Maternal Grandmother      Current Outpatient Prescriptions   Medication Sig Dispense Refill    GLEEVEC 400 mg tablet TAKE ONE TABLET BY MOUTH ONE TIME DAILY 30 Tab 6    pseudoephedrine (SUDAFED) 30 mg tablet Take 1 Tab by mouth every four (4) hours as needed for Congestion. 16 Tab 0    HYDROcodone-acetaminophen (NORCO) 5-325 mg per tablet Take 1 Tab by mouth every four (4) hours as needed for Pain. Max Daily Amount: 6 Tabs. 30 Tab 0    allopurinol (ZYLOPRIM) 100 mg tablet Take 3 Tabs by mouth daily. 30 Tab 3    ferrous sulfate 325 mg (65 mg iron) tablet Take 1 Tab by mouth daily (with breakfast). 30 Tab 3       Review of Systems  Constitutional: The patient has no acute distress or discomfort. HEENT: The patient denies recent head trauma, eye pain, blurred vision,  hearing deficit, oropharyngeal mucosal pain or lesions, and the patient denies throat pain or discomfort. Lymphatics: The patient denies palpable peripheral lymphadenopathy. Hematologic: The patient denies having bruising, bleeding, or progressive fatigue. Respiratory: Patient denies having shortness of breath, cough, sputum production, fever, or dyspnea on exertion. Cardiovascular: The patient denies having leg pain, leg swelling, heart palpitations, chest permit, chest pain, or lightheadedness. The patient denies having dyspnea on exertion. Gastrointestinal: The patient denies having nausea, emesis, or diarrhea. The patient denies having any hematemesis or blood in the stool. Genitourinary: Patient denies having urinary urgency, frequency, or dysuria. The patient denies having blood in the urine. Psychological: The patient denies having symptoms of nervousness, anxiety, depression, or thoughts of harming himself some of this. Skin: Patient denies having skin rashes, skin, ulcerations, or unexplained itching or pruritus.   Musculoskeletal: The patient denies having pain in the joints or bones. Objective:     Visit Vitals    BP 96/58    Pulse 79    Temp 97.6 °F (36.4 °C) (Oral)    Wt 135.6 kg (299 lb)    BMI 32.85 kg/m2     ECOG PS 0  Physical Exam:   Gen. Appearance: The patient is in no acute distress. Skin: There is no bruise or rash. HEENT: The exam is unremarkable. Neck: Supple without lymphadenopathy or thyromegaly. Lungs: Clear to auscultation and percussion; there are no wheezes or rhonchi. Heart: Regular rate and rhythm; there are no murmurs, gallops, or rubs. Abdomen: Bowel sounds are present and normal.  There is no guarding, tenderness, or hepatosplenomegaly. Extremities: There is no clubbing, cyanosis, or edema. Neurologic: There are no focal neurologic deficits. Lymphatics: There is no palpable peripheral lymphadenopathy. Musculoskeletal: The patient has full range of motion at all joints. There is no evidence of joint deformity or effusions. There is no focal joint tenderness. Psychological/psychiatric: There is no clinical evidence of anxiety, depression, or melancholy. Lab data:      Results for orders placed or performed during the hospital encounter of 05/25/18   CBC WITH 3 PART DIFF     Status: Abnormal   Result Value Ref Range Status    WBC 9.8 4.5 - 13.0 K/uL Final    RBC 4.23 4. 10 - 5.10 M/uL Final    HGB 14.1 12.0 - 16.0 g/dL Final    HCT 40.6 36 - 48 % Final    MCV 96.0 78 - 102 FL Final    MCH 33.3 25.0 - 35.0 PG Final    MCHC 34.7 31 - 37 g/dL Final    RDW 13.9 11.5 - 14.5 % Final    PLATELET 711 (L) 719 - 440 K/uL Final    NEUTROPHILS 67 40 - 70 % Final    MIXED CELLS 6 0.1 - 17 % Final    LYMPHOCYTES 27 14 - 44 % Final    ABS. NEUTROPHILS 6.6 1.8 - 9.5 K/UL Final    ABS. MIXED CELLS 0.6 0.0 - 2.3 K/uL Final    ABS. LYMPHOCYTES 2.6 1.1 - 5.9 K/UL Final     Comment: Test performed at [address]. Results Reviewed by Medical Director.     DF AUTOMATED   Final Assessment:     1. CML (chronic myelocytic leukemia) (HCC)    2. Leukocytosis, unspecified type    3. Antineoplastic chemotherapy induced anemia    4. Chemotherapy-induced thrombocytopenia          Plan:   CML/myeloproliferative disorder: The CBC from today shows that his WBC counts are now normal at 9.8. The absolute neutrophil count is normal at 6.6 with an absolute lymphocyte count of 2.6. He will continue Gleevec 400mg PO daily. I will check a BCR-ABL1 level at this time as well as a CMP. Chemotherapy-induced anemia: At this time the CBC shows that his hemoglobin is 14.1 g/dL with hematocrit of 40.6 %. I will check his iron profile and ferritin levels at this time. I have explained to the patient that systemic treatment with Procrit at a dose of 60,000 units subcutaneous every 2 weeks is reserved for hemoglobin level is below 10 g/dL with hematocrit levels below 30%. Leukocytosis:(resolved)  His WBC count today is 9.8 and his neutrophil count of 67% and absolute neutrophil count of 6.6. I explained to the patient that all these numbers are within normal limits. Chemotherapy-induced thrombocytopenia: I have explained to the patient that his current platelet count is 781,755. Therapeutic intervention is not warranted unless the platelet count declined below 30,000. We will continue to monitor this at 8 week intervals. I plan to see the patient in 3 months or sooner if indicated.     Orders Placed This Encounter    COMPLETE CBC & AUTO DIFF WBC    InHouse CBC ([facility])     Standing Status:   Future     Number of Occurrences:   1     Standing Expiration Date:   6/1/2018    BCR-ABL1, PCR, QT     Standing Status:   Future     Number of Occurrences:   1     Standing Expiration Date:   6/72/8370    METABOLIC PANEL, COMPREHENSIVE     Standing Status:   Future     Number of Occurrences:   1     Standing Expiration Date:   5/26/2019    IRON PROFILE     Standing Status:   Future     Number of Occurrences:   1     Standing Expiration Date:   5/26/2019    FERRITIN     Standing Status:   Future     Number of Occurrences:   1     Standing Expiration Date:   5/26/2019       Lacretia Pallas, NP  5/25/2018     I have assessed the patient independently and  agree with the full assessment as outlined.   Bnonie Bang MD, [address]

## 2018-05-25 NOTE — MR AVS SNAPSHOT
[address] [facility] [address] [facility] [address] 
[phone] Patient: Erika Norton MRN: [id number] :1975 Visit Information Date & Time Provider Department Dept. Phone Encounter #  
 2018  2:00 PM Aly Ramon 71 Office [id number] [phone] Follow-up Instructions Return in about 3 months (around 2018). Your Appointments 2018  2:00 PM  
Office Visit with MD Brionna Ramon [address] [place] [address] [facility]) Appt Note: [facility] [address] [facility] [address]  
[phone]  
  
   
 [address] Upcoming Health Maintenance Date Due Pneumococcal 19-64 Highest Risk (1 of 3 - PCV13) 10/4/1993 DTaP/Tdap/Td series (1 - Tdap) 10/4/1995 Influenza Age 5 to Adult 2018 Allergies as of 2018  Review Complete On: 2018 By: Cassie Goncalves MD  
 No Known Allergies Current Immunizations  Reviewed on 9/15/2017 No immunizations on file. Not reviewed this visit You Were Diagnosed With   
  
 Codes Comments CML (chronic myelocytic leukemia) ([facility]    -  Primary ICD-10-CM: C92.10 ICD-9-CM: 205.10 Leukocytosis, unspecified type     ICD-10-CM: D72.829 ICD-9-CM: 288.60 Antineoplastic chemotherapy induced anemia     ICD-10-CM: D64.81, T45.1X5A 
ICD-9-CM: 285.3, E933.1 Chemotherapy-induced thrombocytopenia     ICD-10-CM: D69.59, T45.1X5A 
ICD-9-CM: 287.49, E933.1 Vitals BP Pulse Temp Weight(growth percentile) BMI Smoking Status 96/58 79 97.6 °F (36.4 °C) (Oral) 299 lb (135.6 kg) 32.85 kg/m2 Current Every Day Smoker BMI and BSA Data Body Mass Index Body Surface Area  
 32.85 kg/m 2 2.77 m 2 Preferred Pharmacy Pharmacy Name Phone [address] [place] [address] [address] [phone] Your Updated Medication List  
  
   
This list is accurate as of 5/25/18  2:33 PM.  Always use your most recent med list.  
  
  
  
  
 allopurinol 100 mg tablet Commonly known as:  Magalene Oyster Take 3 Tabs by mouth daily. ferrous sulfate 325 mg (65 mg iron) tablet Take 1 Tab by mouth daily (with breakfast). GLEEVEC 400 mg tablet Generic drug:  imatinib TAKE ONE TABLET BY MOUTH ONE TIME DAILY HYDROcodone-acetaminophen 5-325 mg per tablet Commonly known as:  [place] Jayla Take 1 Tab by mouth every four (4) hours as needed for Pain. Max Daily Amount: 6 Tabs. pseudoephedrine 30 mg tablet Commonly known as:  SUDAFED Take 1 Tab by mouth every four (4) hours as needed for Congestion. We Performed the Following BCR-ABL1, PCR, QT [[id number] Custom] COMPLETE CBC & AUTO DIFF WBC [85011 CPT(R)] FERRITIN [25967 CPT(R)] IRON PROFILE [id number] CPT(R)] METABOLIC PANEL, COMPREHENSIVE [48565 CPT(R)] Follow-up Instructions Return in about 3 months (around 8/25/2018). To-Do List   
 05/25/2018 Lab:  CBC WITH 3 PART DIFF   
  
 05/26/2018 Lab:  BCR-ABL1, PCR, QT   
  
 05/26/2018 Lab:  FERRITIN   
  
 05/26/2018 Lab:  IRON PROFILE   
  
 05/26/2018 Lab:  METABOLIC PANEL, COMPREHENSIVE Patient Instructions Complete Blood Count (CBC): About This Test 
What is it? A complete blood count (CBC) is a blood test that gives important information about your blood cells, especially red blood cells, white blood cells, and platelets. Why is this test done? A CBC may be done as part of a regular physical exam. There are many other reasons that a doctor may want this blood test, including to: · Find the cause of symptoms such as fatigue, weakness, fever, bruising, or weight loss. · Find anemia or an infection. · See how much blood has been lost if there is bleeding. · Diagnose diseases of the blood, such as leukemia or polycythemia. How can you prepare for the test? 
You do not need to do anything before having this test. 
What happens during the test? 
The health professional taking a sample of your blood will: · Wrap an elastic band around your upper arm. This makes the veins below the band larger so it is easier to put a needle into the vein. · Clean the needle site with alcohol. · Put the needle into the vein. · Attach a tube to the needle to fill it with blood. · Remove the band from your arm when enough blood is collected. · Put a gauze pad or cotton ball over the needle site as the needle is removed. · Put pressure on the site and then put on a bandage. If this blood test is done on a baby, a heel stick may be done instead of a blood draw from a vein. What happens after the test? 
· You will probably be able to go home right away. · You can go back to your usual activities right away. Follow-up care is a key part of your treatment and safety. Be sure to make and go to all appointments, and call your doctor if you are having problems. It's also a good idea to keep a list of the medicines you take. Ask your doctor when you can expect to have your test results. Where can you learn more? Go to http://gayle-shai.info/. Enter O196 in the search box to learn more about \"Complete Blood Count (CBC): About This Test.\" Current as of: October 14, 2016 Content Version: 11.4 © 8856-2629 Healthwise, Incorporated. Care instructions adapted under license by [employer]erd (which disclaims liability or warranty for this information). If you have questions about a medical condition or this instruction, always ask your healthcare professional. [place] [address] any warranty or liability for your use of this information. Introducing [facility]!    
 [facility] introduces [employer] patient portal. Now you can access parts of your medical record, email your doctor's office, and request medication refills online. 1. In your internet browser, go to [email or web address] 2. Click on the First Time User? Click Here link in the Sign In box. You will see the New Member Sign Up page. 3. Enter your MarkLogic Access Code exactly as it appears below. You will not need to use this code after youve completed the sign-up process. If you do not sign up before the expiration date, you must request a new code. · Mark[employer]ic Access Code: [email or web address] Expires: 8/23/2018  2:31 PM 
 
4. Enter the last four digits of your Social Security Number (xxxx) and Date of Birth (mm/dd/yyyy) as indicated and click Submit. You will be taken to the next sign-up page. 5. Create a MarkLogic ID. This will be your MarkLogic login ID and cannot be changed, so think of one that is secure and easy to remember. 6. Create a MarkLogic password. You can change your password at any time. 7. Enter your Password Reset Question and Answer. This can be used at a later time if you forget your password. 8. Enter your e-mail address. You will receive e-mail notification when new information is available in [address] 9. Click Sign Up. You can now view and download portions of your medical record. 10. Click the Download Summary menu link to download a portable copy of your medical information. If you have questions, please visit the Frequently Asked Questions section of the MarkLogic website. Remember, [employer] is NOT to be used for urgent needs. For medical emergencies, dial 911. Now available from your iPhone and Android! Please provide this summary of care documentation to your next provider. Your primary care clinician is listed as [address] [facility]. If you have any questions after today's visit, please call [phone].

## 2018-05-26 LAB
A-G RATIO,AGRAT: 1.7 RATIO (ref 1.1–2.6)
ALBUMIN SERPL-MCNC: 4.3 G/DL (ref 3.5–5)
ALP SERPL-CCNC: 73 U/L (ref 25–115)
ALT SERPL-CCNC: 25 U/L (ref 5–40)
ANION GAP SERPL CALC-SCNC: 12 MMOL/L
AST SERPL W P-5'-P-CCNC: 22 U/L (ref 10–37)
BILIRUB SERPL-MCNC: 0.3 MG/DL (ref 0.2–1.2)
BUN SERPL-MCNC: 12 MG/DL (ref 6–22)
CALCIUM SERPL-MCNC: 9.1 MG/DL (ref 8.4–10.4)
CHLORIDE SERPL-SCNC: 105 MMOL/L (ref 98–110)
CO2 SERPL-SCNC: 26 MMOL/L (ref 20–32)
CREAT SERPL-MCNC: 1.3 MG/DL (ref 0.5–1.2)
FE % SATURATION,PSAT: 36 % (ref 20–50)
FERRITIN SERPL-MCNC: 203 NG/ML (ref 22–322)
GFRAA, 66117: >60
GFRNA, 66118: 58.2
GLOBULIN,GLOB: 2.6 G/DL (ref 2–4)
GLUCOSE SERPL-MCNC: 89 MG/DL (ref 70–99)
IRON,IRN: 91 MCG/DL (ref 45–160)
POTASSIUM SERPL-SCNC: 3.9 MMOL/L (ref 3.5–5.5)
PROT SERPL-MCNC: 6.9 G/DL (ref 6.4–8.3)
SODIUM SERPL-SCNC: 143 MMOL/L (ref 133–145)
TIBC,TIBC: 252 MCG/DL (ref 228–428)
UIBC SERPL-MCNC: 161 MCG/DL (ref 110–370)

## 2018-06-01 LAB
B2B2 TRANSCRIPT: 49.25 %
B3B2 TRANSCRIPT: 25.06 %
BACKGROUND BCR-ABL: NORMAL
DIRECTOR REVIEW BCR-ABL: NORMAL
E1A2 TRANSCRIPT 480502: NORMAL %
INTERPRETATION BCR-ABL: NORMAL
METHODOLOGY BCR-ABL1: NORMAL

## 2018-08-28 ENCOUNTER — HOSPITAL ENCOUNTER (OUTPATIENT)
Dept: ONCOLOGY | Age: 43
Discharge: HOME | End: 2018-08-28

## 2018-08-28 ENCOUNTER — OFFICE VISIT (OUTPATIENT)
Dept: ONCOLOGY | Age: 43
End: 2018-08-28

## 2018-08-28 VITALS
HEIGHT: 78 IN | HEART RATE: 41 BPM | WEIGHT: 302 LBS | RESPIRATION RATE: 18 BRPM | BODY MASS INDEX: 34.94 KG/M2 | TEMPERATURE: 98.1 F | DIASTOLIC BLOOD PRESSURE: 96 MMHG | SYSTOLIC BLOOD PRESSURE: 157 MMHG

## 2018-08-28 DIAGNOSIS — D72.829 LEUKOCYTOSIS, UNSPECIFIED TYPE: ICD-10-CM

## 2018-08-28 DIAGNOSIS — C92.10 CML (CHRONIC MYELOCYTIC LEUKEMIA) (HCC): Primary | ICD-10-CM

## 2018-08-28 DIAGNOSIS — D47.1 MYELOPROLIFERATIVE DISORDER (HCC): ICD-10-CM

## 2018-08-28 DIAGNOSIS — T45.1X5A ANTINEOPLASTIC CHEMOTHERAPY INDUCED ANEMIA: ICD-10-CM

## 2018-08-28 DIAGNOSIS — C92.10 CML (CHRONIC MYELOCYTIC LEUKEMIA) (HCC): ICD-10-CM

## 2018-08-28 DIAGNOSIS — T45.1X5A CHEMOTHERAPY-INDUCED THROMBOCYTOPENIA: ICD-10-CM

## 2018-08-28 DIAGNOSIS — D64.81 ANTINEOPLASTIC CHEMOTHERAPY INDUCED ANEMIA: ICD-10-CM

## 2018-08-28 DIAGNOSIS — D69.59 CHEMOTHERAPY-INDUCED THROMBOCYTOPENIA: ICD-10-CM

## 2018-08-28 LAB
BASO+EOS+MONOS # BLD AUTO: 0.6 K/UL (ref 0–2.3)
BASO+EOS+MONOS # BLD AUTO: 5 % (ref 0.1–17)
DIFFERENTIAL METHOD BLD: ABNORMAL
ERYTHROCYTE [DISTWIDTH] IN BLOOD BY AUTOMATED COUNT: 13.5 % (ref 11.5–14.5)
HCT VFR BLD AUTO: 40.5 % (ref 36–48)
HGB BLD-MCNC: 13.9 G/DL (ref 12–16)
LYMPHOCYTES # BLD: 2.8 K/UL (ref 1.1–5.9)
LYMPHOCYTES NFR BLD: 22 % (ref 14–44)
MCH RBC QN AUTO: 32.4 PG (ref 25–35)
MCHC RBC AUTO-ENTMCNC: 34.3 G/DL (ref 31–37)
MCV RBC AUTO: 94.4 FL (ref 78–102)
NEUTS SEG # BLD: 9.4 K/UL (ref 1.8–9.5)
NEUTS SEG NFR BLD: 73 % (ref 40–70)
PLATELET # BLD AUTO: 175 K/UL (ref 140–440)
RBC # BLD AUTO: 4.29 M/UL (ref 4.1–5.1)
WBC # BLD AUTO: 12.8 K/UL (ref 4.5–13)

## 2018-08-28 NOTE — PATIENT INSTRUCTIONS
Complete Blood Count (CBC): About This Test  What is it? A complete blood count (CBC) is a blood test that gives important information about your blood cells, especially red blood cells, white blood cells, and platelets. Why is this test done? A CBC may be done as part of a regular physical exam. There are many other reasons that a doctor may want this blood test, including to:  · Find the cause of symptoms such as fatigue, weakness, fever, bruising, or weight loss. · Find anemia or an infection. · See how much blood has been lost if there is bleeding. · Diagnose diseases of the blood, such as leukemia or polycythemia. How can you prepare for the test?  You do not need to do anything before having this test.  What happens during the test?  The health professional taking a sample of your blood will:  · Wrap an elastic band around your upper arm. This makes the veins below the band larger so it is easier to put a needle into the vein. · Clean the needle site with alcohol. · Put the needle into the vein. · Attach a tube to the needle to fill it with blood. · Remove the band from your arm when enough blood is collected. · Put a gauze pad or cotton ball over the needle site as the needle is removed. · Put pressure on the site and then put on a bandage. If this blood test is done on a baby, a heel stick may be done instead of a blood draw from a vein. What happens after the test?  · You will probably be able to go home right away. · You can go back to your usual activities right away. Follow-up care is a key part of your treatment and safety. Be sure to make and go to all appointments, and call your doctor if you are having problems. It's also a good idea to keep a list of the medicines you take. Ask your doctor when you can expect to have your test results. Where can you learn more? Go to http://gayle-shai.info/.   Enter T157 in the search box to learn more about \"Complete Blood Count (CBC): About This Test.\"  Current as of: October 9, 2017  Content Version: 11.7  © 5445-9431 EverTune, Incorporated. Care instructions adapted under license by [employer] (which disclaims liability or warranty for this information). If you have questions about a medical condition or this instruction, always ask your healthcare professional. [address] any warranty or liability for your use of this information.

## 2018-08-28 NOTE — MR AVS SNAPSHOT
[address] [facility] [address] [facility] [address] 
[phone] Patient: Nusrat Estrada MRN: [id number] :1975 Visit Information Date & Time Provider Department Dept. Phone Encounter #  
 2018  2:00 PM Jeramie Calderon MD  Doctors  [phone] [id number] Follow-up Instructions Return in about 3 months (around 2018). Your Appointments 2018 10:45 AM  
Office Visit with MD Ekta Denis [facility]  [address] [facility]) Appt Note: OV  
 [facility] [address] [facility] [address]  
[phone]  
  
   
 [facility] [address] Upcoming Health Maintenance Date Due Pneumococcal 19-64 Highest Risk (1 of 3 - PCV13) 10/4/1993 DTaP/Tdap/Td series (1 - Tdap) 10/4/1995 Influenza Age 5 to Adult 2018 Allergies as of 2018  Review Complete On: 2018 By: Jeramie Calderon MD  
 No Known Allergies Current Immunizations  Reviewed on 9/15/2017 No immunizations on file. Not reviewed this visit You Were Diagnosed With   
  
 Codes Comments CML (chronic myelocytic leukemia) ([facility]    -  Primary ICD-10-CM: C92.10 ICD-9-CM: 205.10 Myeloproliferative disorder ([facility] 75.)     ICD-10-CM: D47.1 ICD-9-CM: 238.79 Chemotherapy-induced thrombocytopenia     ICD-10-CM: D69.59, T45.1X5A 
ICD-9-CM: 287.49, E933.1 Antineoplastic chemotherapy induced anemia     ICD-10-CM: D64.81, T45.1X5A 
ICD-9-CM: 285.3, E933.1 Leukocytosis, unspecified type     ICD-10-CM: D72.829 ICD-9-CM: 288.60 Vitals BP Pulse Temp Resp Height(growth percentile) Weight(growth percentile) (!) 157/96 (BP 1 Location: Left arm, BP Patient Position: Sitting) (!) 41 98.1 °F (36.7 °C) (Oral) 18 6' 8\" (2.032 m) 302 lb (137 kg) BMI Smoking Status 33.18 kg/m2 Current Every Day Smoker BMI and BSA Data Body Mass Index Body Surface Area  
 33.18 kg/m 2 2.78 m 2 Preferred Pharmacy Pharmacy Name Phone [address] [facility], [address] [place] [address] Road [address] [phone] Your Updated Medication List  
  
   
This list is accurate as of 8/28/18  3:08 PM.  Always use your most recent med list.  
  
  
  
  
 allopurinol 100 mg tablet Commonly known as:  Franny Kristin Take 3 Tabs by mouth daily. ferrous sulfate 325 mg (65 mg iron) tablet Take 1 Tab by mouth daily (with breakfast). GLEEVEC 400 mg tablet Generic drug:  imatinib TAKE ONE TABLET BY MOUTH ONE TIME DAILY HYDROcodone-acetaminophen 5-325 mg per tablet Commonly known as:  Havennettgarrett Blantonan Take 1 Tab by mouth every four (4) hours as needed for Pain. Max Daily Amount: 6 Tabs. pseudoephedrine 30 mg tablet Commonly known as:  SUDAFED Take 1 Tab by mouth every four (4) hours as needed for Congestion. We Performed the Following COMPLETE CBC & AUTO DIFF WBC [67655 CPT(R)] Follow-up Instructions Return in about 3 months (around 11/28/2018). To-Do List   
 08/28/2018 Lab:  BCR-ABL1, PCR, QT   
  
 08/28/2018 Lab:  CBC WITH 3 PART DIFF   
  
 08/28/2018 Lab:  METABOLIC PANEL, COMPREHENSIVE Patient Instructions Complete Blood Count (CBC): About This Test 
What is it? A complete blood count (CBC) is a blood test that gives important information about your blood cells, especially red blood cells, white blood cells, and platelets. Why is this test done? A CBC may be done as part of a regular physical exam. There are many other reasons that a doctor may want this blood test, including to: · Find the cause of symptoms such as fatigue, weakness, fever, bruising, or weight loss. · Find anemia or an infection. · See how much blood has been lost if there is bleeding. · Diagnose diseases of the blood, such as leukemia or polycythemia. How can you prepare for the test? 
You do not need to do anything before having this test. 
What happens during the test? 
The health professional taking a sample of your blood will: · Wrap an elastic band around your upper arm. This makes the veins below the band larger so it is easier to put a needle into the vein. · Clean the needle site with alcohol. · Put the needle into the vein. · Attach a tube to the needle to fill it with blood. · Remove the band from your arm when enough blood is collected. · Put a gauze pad or cotton ball over the needle site as the needle is removed. · Put pressure on the site and then put on a bandage. If this blood test is done on a baby, a heel stick may be done instead of a blood draw from a vein. What happens after the test? 
· You will probably be able to go home right away. · You can go back to your usual activities right away. Follow-up care is a key part of your treatment and safety. Be sure to make and go to all appointments, and call your doctor if you are having problems. It's also a good idea to keep a list of the medicines you take. Ask your doctor when you can expect to have your test results. Where can you learn more? Go to http://gayle-shai.info/. Enter O971 in the search box to learn more about \"Complete Blood Count (CBC): About This Test.\" Current as of: October 9, 2017 Content Version: 11.7 © 3720-2022 TellmeGen, Incorporated. Care instructions adapted under license by [employer]hands (which disclaims liability or warranty for this information). If you have questions about a medical condition or this instruction, always ask your healthcare professional. [place] [address] any warranty or liability for your use of this information. Introducing [address]!    
 [email or web address] introduces [employer] patient portal. Now you can access parts of your medical record, email your doctor's office, and request medication refills online. 1. In your internet browser, go to [email or web address] 2. Click on the First Time User? Click Here link in the Sign In box. You will see the New Member Sign Up page. 3. Enter your Nervana Systems Access Code exactly as it appears below. You will not need to use this code after youve completed the sign-up process. If you do not sign up before the expiration date, you must request a new code. · Nervana Systems Access Code: [email or web address] Expires: 11/26/2018  3:07 PM 
 
4. Enter the last four digits of your Social Security Number (xxxx) and Date of Birth (mm/dd/yyyy) as indicated and click Submit. You will be taken to the next sign-up page. 5. Create a Nervana Systems ID. This will be your Nervana Systems login ID and cannot be changed, so think of one that is secure and easy to remember. 6. Create a N[employer]ana Systems password. You can change your password at any time. 7. Enter your Password Reset Question and Answer. This can be used at a later time if you forget your password. 8. Enter your e-mail address. You will receive e-mail notification when new information is available in [address] 9. Click Sign Up. You can now view and download portions of your medical record. 10. Click the Download Summary menu link to download a portable copy of your medical information. If you have questions, please visit the Frequently Asked Questions section of the Nervana Systems website. Remember, N[employer]ana [employer] is NOT to be used for urgent needs. For medical emergencies, dial 911. Now available from your iPhone and Android! Please provide this summary of care documentation to your next provider. Your primary care clinician is listed as [address] [facility]. If you have any questions after today's visit, please call [phone].

## 2018-08-28 NOTE — PROGRESS NOTES
Hematology/Oncology  Progress Note    Name: Angella Medina  Date: 2018  : 1975    Trish Damon MD     Mr. Darling Greer is a 36y.o. year old male who was seen for newly diagnosed CML    Current Therapy: Gleevec 400mg PO daily      Subjective:     Mr. Darling Greer is a 44-year-old -American man who was recently diagnosed with CML. He was started on Gleevec 400 mg PO daily as well as Allopurinol at a dose of 300 mg daily. He reports he feels well. He has no complaints or concerns to report. He denies abdominal pain or discomfort. Denies dizziness, headaches, fevers. His appetite and energy level are reasonably well. He has no complaints of pain. His  and GI habits are normal.  The patient reports that his appetite and energy level are continuing to improve. He continues to tolerate the Gleevec without untoward side effects. Initially during the course of his therapy he developed some therapy induced anemia. He was instructed to continue taking an oral iron supplement daily. Today he reports that he has been compliant with taking the iron therapy and reports that his energy level has continued to improve. He has no new complaints or concerns to report at this time. Past medical history, family history, and social history: these were reviewed and remains unchanged. Past Medical History:   Diagnosis Date    Anemia     Arthritis     Right knee    [facility] myelocytic [facility]) [facility] [address]     STD (sexually transmitted disease)      Past Surgical History:   Procedure Laterality Date    HX KNEE ARTHROSCOPY       Social History     Social History    Marital status: UNKNOWN     Spouse name: N/A    Number of children: N/A    Years of education: N/A     Occupational History    Not on file.      Social History Main Topics    Smoking status: Current Every Day Smoker     Packs/day: 0.25     Years: 8.00     Types: Cigarettes    Smokeless tobacco: Never Used    Alcohol use No    Drug use: No    Sexual activity: Not on file     Other Topics Concern    Not on file     Social History Narrative     Family History   Problem Relation Age of Onset    Hypertension Maternal Grandmother      Current Outpatient Prescriptions   Medication Sig Dispense Refill    GLEEVEC 400 mg tablet TAKE ONE TABLET BY MOUTH ONE TIME DAILY 30 Tab 6    pseudoephedrine (SUDAFED) 30 mg tablet Take 1 Tab by mouth every four (4) hours as needed for Congestion. 16 Tab 0    HYDROcodone-acetaminophen (NORCO) 5-325 mg per tablet Take 1 Tab by mouth every four (4) hours as needed for Pain. Max Daily Amount: 6 Tabs. 30 Tab 0    allopurinol (ZYLOPRIM) 100 mg tablet Take 3 Tabs by mouth daily. 30 Tab 3    ferrous sulfate 325 mg (65 mg iron) tablet Take 1 Tab by mouth daily (with breakfast). 30 Tab 3       Review of Systems  Constitutional: The patient has no acute distress or discomfort. HEENT: The patient denies recent head trauma, eye pain, blurred vision,  hearing deficit, oropharyngeal mucosal pain or lesions, and the patient denies throat pain or discomfort. Lymphatics: The patient denies palpable peripheral lymphadenopathy. Hematologic: The patient denies having bruising, bleeding, or progressive fatigue. Respiratory: Patient denies having shortness of breath, cough, sputum production, fever, or dyspnea on exertion. Cardiovascular: The patient denies having leg pain, leg swelling, heart palpitations, chest permit, chest pain, or lightheadedness. The patient denies having dyspnea on exertion. Gastrointestinal: The patient denies having nausea, emesis, or diarrhea. The patient denies having any hematemesis or blood in the stool. Genitourinary: Patient denies having urinary urgency, frequency, or dysuria. The patient denies having blood in the urine. Psychological: The patient denies having symptoms of nervousness, anxiety, depression, or thoughts of harming himself some of this.   Skin: Patient denies having skin rashes, skin, ulcerations, or unexplained itching or pruritus. Musculoskeletal: The patient denies having pain in the joints or bones. Objective:     Visit Vitals    BP (!) 157/96 (BP 1 Location: Left arm, BP Patient Position: Sitting)    Pulse (!) 41    Temp 98.1 °F (36.7 °C) (Oral)    Resp 18    Ht 6' 8\" (2.032 m)    Wt 137 kg (302 lb)    BMI 33.18 kg/m2     ECOG PS 0  Physical Exam:   Gen. Appearance: The patient is in no acute distress. Skin: There is no bruise or rash. HEENT: The exam is unremarkable. Neck: Supple without lymphadenopathy or thyromegaly. Lungs: Clear to auscultation and percussion; there are no wheezes or rhonchi. Heart: Regular rate and rhythm; there are no murmurs, gallops, or rubs. Abdomen: Bowel sounds are present and normal.  There is no guarding, tenderness, or hepatosplenomegaly. Extremities: There is no clubbing, cyanosis, or edema. Neurologic: There are no focal neurologic deficits. Lymphatics: There is no palpable peripheral lymphadenopathy. Musculoskeletal: The patient has full range of motion at all joints. There is no evidence of joint deformity or effusions. There is no focal joint tenderness. Psychological/psychiatric: There is no clinical evidence of anxiety, depression, or melancholy. Lab data:      Results for orders placed or performed during the hospital encounter of 08/28/18   CBC WITH 3 PART DIFF     Status: Abnormal   Result Value Ref Range Status    WBC 12.8 4.5 - 13.0 K/uL Final    RBC 4.29 4. 10 - 5.10 M/uL Final    HGB 13.9 12.0 - 16.0 g/dL Final    HCT 40.5 36 - 48 % Final    MCV 94.4 78 - 102 FL Final    MCH 32.4 25.0 - 35.0 PG Final    MCHC 34.3 31 - 37 g/dL Final    RDW 13.5 11.5 - 14.5 % Final    PLATELET 599 885 - 477 K/uL Final    NEUTROPHILS 73 (H) 40 - 70 % Final    MIXED CELLS 5 0.1 - 17 % Final    LYMPHOCYTES 22 14 - 44 % Final    ABS. NEUTROPHILS 9.4 1.8 - 9.5 K/UL Final    ABS. MIXED CELLS 0.6 0.0 - 2.3 K/uL Final    ABS.  LYMPHOCYTES 2.8 1.1 - 5.9 K/UL Final     Comment: Test performed [facility]. Results Reviewed by Medical Director. DF AUTOMATED   Final           Assessment:     1. CML (chronic myelocytic leukemia) ([facility] [address]    2. Myeloproliferative disorder ([facility] [address]    3. Chemotherapy-induced thrombocytopenia    4. Antineoplastic chemotherapy induced anemia    5. Leukocytosis, unspecified type          Plan:   CML/myeloproliferative disorder: The CBC from today shows that his WBC counts are now normal at 12.8. The absolute neutrophil count is normal at 9.4 with an absolute lymphocyte count of 2.8. He will continue Gleevec 400mg PO daily. I will check a BCR-ABL1 level at this time as well as a CMP. Chemotherapy-induced anemia: At this time the CBC shows that his hemoglobin is 13.9 g/dL with hematocrit of 40.5 %. I will check his iron profile and ferritin levels at this time. I have explained to the patient that systemic treatment with Procrit at a dose of 60,000 units subcutaneous every 2 weeks is reserved for hemoglobin level is below 10 g/dL with hematocrit levels below 30%. Leukocytosis:(resolved)  His WBC count today is 12.8 and his neutrophil count of 73 % and absolute neutrophil count of 9.4. I explained to the patient that all these numbers are within normal limits. Chemotherapy-induced thrombocytopenia: I have explained to the patient that his current platelet count is normal at 175,000. Therapeutic intervention is not warranted unless the platelet count declined below 30,000. We will continue to monitor this at 8 week intervals. I plan to see the patient in 3 months or sooner if indicated.     Orders Placed This Encounter    COMPLETE CBC & AUTO DIFF WBC    InHouse CBC ([employer])     Standing Status:   Future     Number of Occurrences:   1     Standing Expiration Date:   1/2/9173    METABOLIC PANEL, COMPREHENSIVE     Standing Status:   Future     Standing Expiration Date:   8/29/2019    BCR-ABL1, PCR, QT     Standing Status:   Future     Standing Expiration Date:   8/29/2019       Merlin Grandchild, MD  8/28/2018

## 2018-08-29 LAB
A-G RATIO,AGRAT: 1.7 RATIO (ref 1.1–2.6)
ALBUMIN SERPL-MCNC: 4.3 G/DL (ref 3.5–5)
ALP SERPL-CCNC: 81 U/L (ref 25–115)
ALT SERPL-CCNC: 25 U/L (ref 5–40)
ANION GAP SERPL CALC-SCNC: 21 MMOL/L
AST SERPL W P-5'-P-CCNC: 15 U/L (ref 10–37)
BILIRUB SERPL-MCNC: 0.3 MG/DL (ref 0.2–1.2)
BUN SERPL-MCNC: 15 MG/DL (ref 6–22)
CALCIUM SERPL-MCNC: 9 MG/DL (ref 8.4–10.4)
CHLORIDE SERPL-SCNC: 99 MMOL/L (ref 98–110)
CO2 SERPL-SCNC: 22 MMOL/L (ref 20–32)
CREAT SERPL-MCNC: 1.2 MG/DL (ref 0.5–1.2)
GFRAA, 66117: >60
GFRNA, 66118: >60
GLOBULIN,GLOB: 2.5 G/DL (ref 2–4)
GLUCOSE SERPL-MCNC: 84 MG/DL (ref 70–99)
POTASSIUM SERPL-SCNC: 4.3 MMOL/L (ref 3.5–5.5)
PROT SERPL-MCNC: 6.8 G/DL (ref 6.4–8.3)
SODIUM SERPL-SCNC: 142 MMOL/L (ref 133–145)

## 2018-09-04 LAB
B2B2 TRANSCRIPT: 5.25 %
B3B2 TRANSCRIPT: 7.95 %
BACKGROUND BCR-ABL: NORMAL
DIRECTOR REVIEW BCR-ABL: NORMAL
E1A2 TRANSCRIPT 480502: NORMAL %
INTERPRETATION BCR-ABL: NORMAL
METHODOLOGY BCR-ABL1: NORMAL

## 2018-10-10 ENCOUNTER — DOCUMENTATION ONLY (OUTPATIENT)
Dept: ONCOLOGY | Age: 43
End: 2018-10-10

## 2018-10-10 NOTE — PROGRESS NOTES
Called pt's cell phone and left a message with person who answered informing them to have Mr. Mcneil to give me a call back at the office

## 2018-11-27 ENCOUNTER — OFFICE VISIT (OUTPATIENT)
Dept: ONCOLOGY | Age: 43
End: 2018-11-27

## 2018-11-27 ENCOUNTER — HOSPITAL ENCOUNTER (OUTPATIENT)
Dept: ONCOLOGY | Age: 43
Discharge: HOME | End: 2018-11-27

## 2018-11-27 VITALS
TEMPERATURE: 98.2 F | OXYGEN SATURATION: 99 % | BODY MASS INDEX: 36.01 KG/M2 | SYSTOLIC BLOOD PRESSURE: 128 MMHG | WEIGHT: 311.2 LBS | HEART RATE: 42 BPM | DIASTOLIC BLOOD PRESSURE: 78 MMHG | HEIGHT: 78 IN

## 2018-11-27 DIAGNOSIS — C92.10 CML (CHRONIC MYELOCYTIC LEUKEMIA) (HCC): ICD-10-CM

## 2018-11-27 DIAGNOSIS — C92.10 CML (CHRONIC MYELOCYTIC LEUKEMIA) (HCC): Primary | ICD-10-CM

## 2018-11-27 DIAGNOSIS — D64.81 ANTINEOPLASTIC CHEMOTHERAPY INDUCED ANEMIA: ICD-10-CM

## 2018-11-27 DIAGNOSIS — D47.1 MYELOPROLIFERATIVE DISORDER (HCC): ICD-10-CM

## 2018-11-27 DIAGNOSIS — T45.1X5A CHEMOTHERAPY-INDUCED THROMBOCYTOPENIA: ICD-10-CM

## 2018-11-27 DIAGNOSIS — D69.59 CHEMOTHERAPY-INDUCED THROMBOCYTOPENIA: ICD-10-CM

## 2018-11-27 DIAGNOSIS — T45.1X5A ANTINEOPLASTIC CHEMOTHERAPY INDUCED ANEMIA: ICD-10-CM

## 2018-11-27 LAB
A-G RATIO,AGRAT: 2 RATIO (ref 1.1–2.6)
ALBUMIN SERPL-MCNC: 4.6 G/DL (ref 3.5–5)
ALP SERPL-CCNC: 74 U/L (ref 25–115)
ALT SERPL-CCNC: 29 U/L (ref 5–40)
ANION GAP SERPL CALC-SCNC: 14 MMOL/L
AST SERPL W P-5'-P-CCNC: 20 U/L (ref 10–37)
BASO+EOS+MONOS # BLD AUTO: 0.6 K/UL (ref 0–2.3)
BASO+EOS+MONOS # BLD AUTO: 4 % (ref 0.1–17)
BILIRUB SERPL-MCNC: 0.5 MG/DL (ref 0.2–1.2)
BUN SERPL-MCNC: 12 MG/DL (ref 6–22)
CALCIUM SERPL-MCNC: 8.9 MG/DL (ref 8.4–10.4)
CHLORIDE SERPL-SCNC: 100 MMOL/L (ref 98–110)
CO2 SERPL-SCNC: 27 MMOL/L (ref 20–32)
CREAT SERPL-MCNC: 1.5 MG/DL (ref 0.5–1.2)
DIFFERENTIAL METHOD BLD: ABNORMAL
ERYTHROCYTE [DISTWIDTH] IN BLOOD BY AUTOMATED COUNT: 14.1 % (ref 11.5–14.5)
FE % SATURATION,PSAT: 56 % (ref 20–50)
FERRITIN SERPL-MCNC: 287 NG/ML (ref 22–322)
GFRAA, 66117: >60
GFRNA, 66118: 49.7
GLOBULIN,GLOB: 2.3 G/DL (ref 2–4)
GLUCOSE SERPL-MCNC: 88 MG/DL (ref 70–99)
HCT VFR BLD AUTO: 42.7 % (ref 36–48)
HGB BLD-MCNC: 14.4 G/DL (ref 12–16)
IRON,IRN: 143 MCG/DL (ref 45–160)
LYMPHOCYTES # BLD: 2.7 K/UL (ref 1.1–5.9)
LYMPHOCYTES NFR BLD: 18 % (ref 14–44)
MCH RBC QN AUTO: 31.6 PG (ref 25–35)
MCHC RBC AUTO-ENTMCNC: 33.7 G/DL (ref 31–37)
MCV RBC AUTO: 93.6 FL (ref 78–102)
NEUTS SEG # BLD: 11.4 K/UL (ref 1.8–9.5)
NEUTS SEG NFR BLD: 78 % (ref 40–70)
PLATELET # BLD AUTO: 151 K/UL (ref 140–440)
POTASSIUM SERPL-SCNC: 4.3 MMOL/L (ref 3.5–5.5)
PROT SERPL-MCNC: 6.9 G/DL (ref 6.4–8.3)
RBC # BLD AUTO: 4.56 M/UL (ref 4.1–5.1)
SODIUM SERPL-SCNC: 141 MMOL/L (ref 133–145)
TIBC,TIBC: 257 MCG/DL (ref 228–428)
UIBC SERPL-MCNC: 114 MCG/DL (ref 110–370)
WBC # BLD AUTO: 14.7 K/UL (ref 4.5–13)

## 2018-11-27 NOTE — PATIENT INSTRUCTIONS
Chronic Myelogenous Leukemia: Care Instructions  Your Care Instructions    Leukemia is a type of cancer that causes your body to make too many blood cells, especially white blood cells. White blood cells are a part of your immune system, which helps protect you from infection and disease. In chronic myelogenous leukemia (CML), large numbers of white blood cells are made by the bone marrow. Over time, these cells may not work as they should, and they may cause symptoms as they begin to crowd out healthy white blood cells and other parts of your blood. But chronic leukemia gets worse slowly, and you may have few or no symptoms for months or years. It is often discovered during a routine blood test.  There are many treatments for CML, including chemotherapy and targeted therapy. A healthy diet, exercise, extra rest, and a strong support system can help you feel better. Many people also find that getting counseling or joining a support group helps them cope with their illness. When you find out that you have cancer, you may feel many emotions and may need some help coping. Seek out family, friends, and counselors for support. You also can do things at home to make yourself feel better while you go through treatment. Call the [employer] (7-614.676.3888) or visit its website at [address] for more information. Follow-up care is a key part of your treatment and safety. Be sure to make and go to all appointments, and call your doctor if you are having problems. It's also a good idea to know your test results and keep a list of the medicines you take. How can you care for yourself at home? · Take your medicines exactly as prescribed. You may get medicine for nausea, vomiting, or pain (although leukemia rarely causes pain). Call your doctor if you think you are having a problem with your medicine. You will get more details on the specific medicines your doctor prescribes. · Eat healthy food.  If you do not feel like eating, try to eat food that has protein and extra calories to keep up your strength and prevent weight loss. Drink liquid meal replacements for extra calories and protein. Try to eat your main meal early. · Get some physical activity every day, but do not get too tired. Keep doing the hobbies you enjoy as your energy allows. · Take steps to control your stress and workload. Learn relaxation techniques. ? Share your feelings. Stress and tension affect our emotions. By expressing your feelings to others, you may be able to understand and cope with them. ? Consider joining a support group. Talking about a problem with your spouse, a good friend, or other people with similar problems is a good way to reduce tension and stress. ? Express yourself through art. Try writing, dance, art, or crafts to relieve tension. Some dance, writing, or art groups may be available just for people who have cancer. ? Be kind to your body and mind. Getting enough sleep, eating a nutritious diet, and taking time to do things you enjoy can contribute to an overall feeling of balance in your life and help reduce stress. ? Get help if you need it. Discuss your concerns with your doctor or counselor. · If you are vomiting or have diarrhea:  ? Drink plenty of fluids (enough so that your urine is light yellow or clear like water) to prevent dehydration. Choose water and other caffeine-free clear liquids. If you have kidney, heart, or liver disease and have to limit fluids, talk with your doctor before you increase the amount of fluids you drink. ? When you are able to eat, try clear soups, mild foods, and liquids until all symptoms are gone for 12 to 48 hours. Other good choices include dry toast, crackers, cooked cereal, and gelatin dessert, such as Jell-O.  · Avoid colds and flu. Get a pneumococcal vaccine shot. If you have had one before, ask your doctor whether you need another dose. Get a flu shot every year.  If you must be around people with colds or flu, wash your hands often. · Do not smoke. If you need help quitting, talk to your doctor about stop-smoking programs and medicines. These can increase your chances of quitting for good. When should you call for help? Call 911 anytime you think you may need emergency care. For example, call if:    · You passed out (lost consciousness).    Call your doctor now or seek immediate medical care if:    · You have a fever.     · You have abnormal bleeding.     · You think you have an infection.     · You have new or worse pain.     · You have new symptoms, such as a cough, belly pain, vomiting, diarrhea, or a rash.    Watch closely for changes in your health, and be sure to contact your doctor if:    · You are much more tired than usual.     · You have swollen glands in your armpits, groin, or neck.     · You do not get better as expected. Where can you learn more? Go to http://gayle-shai.info/. Enter C276 in the search box to learn more about \"Chronic Myelogenous Leukemia: Care Instructions. \"  Current as of: March 28, 2018  Content Version: 11.8  © 7824-8671 [employer], [employer]. Care instructions adapted under license by [employer] (which disclaims liability or warranty for this information). If you have questions about a medical condition or this instruction, always ask your healthcare professional. [address] any warranty or liability for your use of this information.

## 2018-11-27 NOTE — PROGRESS NOTES
Hematology/Oncology  Progress Note    Name: Bebe Rincon  Date: 2018  : 1975    Araceli Osei MD     Mr. Meg Mora is a 39y.o. year old male who was seen for newly diagnosed CML    Current Therapy: Gleevec 400mg PO daily      Subjective:     Mr. Meg Mora is a 40-year-old -American man who was recently diagnosed with CML. He was started on Gleevec 400 mg PO daily as well as Allopurinol at a dose of 300 mg daily. He reports he feels well. He has no complaints or concerns to report. He denies abdominal pain or discomfort. Denies dizziness, headaches, fevers. His appetite and energy level are reasonably well. He has no complaints of pain. His  and GI habits are normal.  The patient reports that his appetite and energy level are continuing to improve. He continues to tolerate the Gleevec without untoward side effects. Initially during the course of his therapy he developed some therapy induced anemia. He was instructed to continue taking an oral iron supplement daily. Today he reports that he has been compliant with taking the iron therapy and reports that his energy level has continued to improve. However he had to go out of town urgently and forgot to take his medicine therefore he did miss 2 doses of medicine. He has no new complaints or concerns to report at this time. Past medical history, family history, and social history: these were reviewed and remains unchanged.     Past Medical History:   Diagnosis Date    Anemia     Arthritis     Right knee    CML (chronic myelocytic leukemia) ([address]     STD (sexually transmitted disease)      Past Surgical History:   Procedure Laterality Date    HX KNEE ARTHROSCOPY       Social History     Socioeconomic History    Marital status: UNKNOWN     Spouse name: Not on file    Number of children: Not on file    Years of education: Not on file    Highest education level: Not on file   Social Needs    Financial resource strain: Not on file   [facility] Food insecurity - worry: Not on file    Food insecurity - inability: Not on file    Transportation needs - medical: Not on file   [employer] needs - non-medical: Not on file   Occupational History    Not on file   Tobacco Use    Smoking status: Current Every Day Smoker     Packs/day: 0.25     Years: 8.00     Pack years: 2.00     Types: Cigarettes    Smokeless tobacco: Never Used   Substance and Sexual Activity    Alcohol use: No    Drug use: No    Sexual activity: Not on file   Other Topics Concern    Not on file   Social History Narrative    Not on file     Family History   Problem Relation Age of Onset    Hypertension Maternal Grandmother      Current Outpatient Medications   Medication Sig Dispense Refill    GLEEVEC 400 mg tablet TAKE ONE TABLET BY MOUTH ONE TIME DAILY 30 Tab 6    pseudoephedrine (SUDAFED) 30 mg tablet Take 1 Tab by mouth every four (4) hours as needed for Congestion. 16 Tab 0    HYDROcodone-acetaminophen (NORCO) 5-325 mg per tablet Take 1 Tab by mouth every four (4) hours as needed for Pain. Max Daily Amount: 6 Tabs. 30 Tab 0    allopurinol (ZYLOPRIM) 100 mg tablet Take 3 Tabs by mouth daily. 30 Tab 3    ferrous sulfate 325 mg (65 mg iron) tablet Take 1 Tab by mouth daily (with breakfast). 30 Tab 3       Review of Systems  Constitutional: The patient has no acute distress or discomfort. HEENT: The patient denies recent head trauma, eye pain, blurred vision,  hearing deficit, oropharyngeal mucosal pain or lesions, and the patient denies throat pain or discomfort. Lymphatics: The patient denies palpable peripheral lymphadenopathy. Hematologic: The patient denies having bruising, bleeding, or progressive fatigue. Respiratory: Patient denies having shortness of breath, cough, sputum production, fever, or dyspnea on exertion. Cardiovascular: The patient denies having leg pain, leg swelling, heart palpitations, chest permit, chest pain, or lightheadedness.   The patient denies having dyspnea on exertion. Gastrointestinal: The patient denies having nausea, emesis, or diarrhea. The patient denies having any hematemesis or blood in the stool. Genitourinary: Patient denies having urinary urgency, frequency, or dysuria. The patient denies having blood in the urine. Psychological: The patient denies having symptoms of nervousness, anxiety, depression, or thoughts of harming himself some of this. Skin: Patient denies having skin rashes, skin, ulcerations, or unexplained itching or pruritus. Musculoskeletal: The patient denies having pain in the joints or bones. Objective:     Visit Vitals  /78   Pulse (!) 42   Temp 98.2 °F (36.8 °C)   Ht 6' 8\" (2.032 m)   Wt 141.2 kg (311 lb 3.2 oz)   SpO2 99%   BMI 34.19 kg/m²     ECOG PS 0  Physical Exam:   Gen. Appearance: The patient is in no acute distress. Skin: There is no bruise or rash. HEENT: The exam is unremarkable. Neck: Supple without lymphadenopathy or thyromegaly. Lungs: Clear to auscultation and percussion; there are no wheezes or rhonchi. Heart: Regular rate and rhythm; there are no murmurs, gallops, or rubs. Abdomen: Bowel sounds are present and normal.  There is no guarding, tenderness, or hepatosplenomegaly. Extremities: There is no clubbing, cyanosis, or edema. Neurologic: There are no focal neurologic deficits. Lymphatics: There is no palpable peripheral lymphadenopathy. Musculoskeletal: The patient has full range of motion at all joints. There is no evidence of joint deformity or effusions. There is no focal joint tenderness. Psychological/psychiatric: There is no clinical evidence of anxiety, depression, or melancholy.     Lab data:      Results for orders placed or performed during the hospital encounter of 11/27/18   CBC WITH 3 PART DIFF     Status: Abnormal   Result Value Ref Range Status    WBC 14.7 (H) 4.5 - 13.0 K/uL Final    RBC 4.56 4.10 - 5.10 M/uL Final    HGB 14.4 12.0 - 16.0 g/dL Final HCT 42.7 36 - 48 % Final    MCV 93.6 78 - 102 FL Final    MCH 31.6 25.0 - 35.0 PG Final    MCHC 33.7 31 - 37 g/dL Final    RDW 14.1 11.5 - 14.5 % Final    PLATELET 071 707 - 007 K/uL Final    NEUTROPHILS 78 (H) 40 - 70 % Final    MIXED CELLS 4 0.1 - 17 % Final    LYMPHOCYTES 18 14 - 44 % Final    ABS. NEUTROPHILS 11.4 (H) 1.8 - 9.5 K/UL Final    ABS. MIXED CELLS 0.6 0.0 - 2.3 K/uL Final    ABS. LYMPHOCYTES 2.7 1.1 - 5.9 K/UL Final     Comment: Test performed at [address]. Results Reviewed by Medical Director. DF AUTOMATED   Final           Assessment:     1. CML (chronic myelocytic leukemia) ([facility] [address]    2. Myeloproliferative disorder ([facility]    3. Chemotherapy-induced thrombocytopenia    4. Antineoplastic chemotherapy induced anemia          Plan:   CML/myeloproliferative disorder: The CBC from today shows that his WBC counts are 14.7. The absolute neutrophil count is 11.4 with an absolute lymphocyte count of 2.7. He will continue Gleevec 400mg PO daily. I will check a BCR-ABL1 level at this time as well as a CMP. Chemotherapy-induced anemia: At this time the CBC shows that his hemoglobin is 14.4 g/dL with hematocrit of 42.7 %. I will check his iron profile and ferritin levels at this time. I have explained to the patient that systemic treatment with Procrit at a dose of 60,000 units subcutaneous every 2 weeks is reserved for hemoglobin level is below 10 g/dL with hematocrit levels below 30%. Leukocytosis:(resolved)  His WBC count today is 14.7 and his neutrophil count of 78 % and absolute neutrophil count of 11.4. I explained to the patient that all these numbers are slightly elevated and may reflect the fact that he missed 2 doses of his Gleevec. Chemotherapy-induced thrombocytopenia: I have explained to the patient that his current platelet count is normal at 151,000. Therapeutic intervention is not warranted unless the platelet count declined below 30,000.   We will continue to monitor this at 8 week intervals. I plan to see the patient in 3 months or sooner if indicated.     Orders Placed This Encounter    COMPLETE CBC & AUTO DIFF WBC    InHouse CBC ([facility])     Standing Status:   Future     Number of Occurrences:   1     Standing Expiration Date:   12/4/2018    BCR-ABL1, PCR, QT     Standing Status:   Future     Number of Occurrences:   1     Standing Expiration Date:   49/52/4476    METABOLIC PANEL, COMPREHENSIVE     Standing Status:   Future     Number of Occurrences:   1     Standing Expiration Date:   11/28/2019    IRON PROFILE     Standing Status:   Future     Number of Occurrences:   1     Standing Expiration Date:   11/28/2019    FERRITIN     Standing Status:   Future     Number of Occurrences:   1     Standing Expiration Date:   11/28/2019    IMMUNOPHENOTYPING PROFILE     Standing Status:   Future     Number of Occurrences:   1     Standing Expiration Date:   11/28/2019     Order Specific Question:   Specimen type     Answer:   Blood [2]       Bryan Spain MD  11/27/2018

## 2018-11-29 LAB
CLINICAL INFORMATION, 18161: NORMAL
COMMENT FLOW, 438: NORMAL
FLOW CYTOMETRY REPORT, 67174: NORMAL
FLOW INTERPRETATION: NORMAL
IMMUNOMARKER INTERPRETATION, 441: NORMAL
IMMUNOMARKERS OBTAINED, 440: NORMAL
LDT STATEMENT: NORMAL
MORPHOLOGIC DESCRIPTION: NORMAL

## 2018-12-03 LAB
B2B2 TRANSCRIPT: 31.97 %
B3B2 TRANSCRIPT: 36.12 %
BACKGROUND BCR-ABL: NORMAL
DIRECTOR REVIEW BCR-ABL: NORMAL
E1A2 TRANSCRIPT 480502: 0 %
INTERPRETATION BCR-ABL: NORMAL
METHODOLOGY BCR-ABL1: NORMAL

## 2019-01-01 DIAGNOSIS — C92.10 CML (CHRONIC MYELOCYTIC LEUKEMIA) (HCC): ICD-10-CM

## 2019-01-01 DIAGNOSIS — C92.10 CML (CHRONIC MYELOCYTIC LEUKEMIA) (HCC): Primary | ICD-10-CM

## 2019-01-01 RX ORDER — IMATINIB MESYLATE 400 MG/1
TABLET, FILM COATED ORAL
Qty: 30 TAB | Refills: 6 | Status: CANCELLED | OUTPATIENT
Start: 2019-01-01

## 2019-01-01 RX ORDER — IMATINIB MESYLATE 400 MG/1
TABLET, FILM COATED ORAL
Qty: 30 TAB | Refills: 6 | Status: SHIPPED | OUTPATIENT
Start: 2019-01-01 | End: 2019-01-01 | Stop reason: SDUPTHER

## 2019-01-01 RX ORDER — IMATINIB MESYLATE 400 MG/1
TABLET, FILM COATED ORAL
Qty: 30 TAB | Refills: 6 | OUTPATIENT
Start: 2019-01-01

## 2019-01-01 RX ORDER — IMATINIB MESYLATE 400 MG/1
TABLET, FILM COATED ORAL
Qty: 30 TAB | Refills: 6 | Status: SHIPPED | OUTPATIENT
Start: 2019-01-01 | End: 2020-01-01 | Stop reason: SDUPTHER

## 2019-10-24 NOTE — TELEPHONE ENCOUNTER
Jim Hogg,    He said the gleevec was requested previously, but instead of [facility], please use [facility] by [employer]. I looked it up using addr he gave, and updated his chart.

## 2020-01-01 ENCOUNTER — APPOINTMENT (OUTPATIENT)
Dept: GENERAL RADIOLOGY | Age: 45
End: 2020-01-01
Attending: EMERGENCY MEDICINE
Payer: COMMERCIAL

## 2020-01-01 ENCOUNTER — DOCUMENTATION ONLY (OUTPATIENT)
Dept: ONCOLOGY | Age: 45
End: 2020-01-01

## 2020-01-01 ENCOUNTER — APPOINTMENT (OUTPATIENT)
Dept: CT IMAGING | Age: 45
End: 2020-01-01
Attending: EMERGENCY MEDICINE
Payer: COMMERCIAL

## 2020-01-01 ENCOUNTER — HOSPITAL ENCOUNTER (EMERGENCY)
Age: 45
Discharge: ANOTHER HOSPITAL | End: 2020-03-23
Attending: EMERGENCY MEDICINE | Admitting: EMERGENCY MEDICINE
Payer: COMMERCIAL

## 2020-01-01 ENCOUNTER — TELEPHONE (OUTPATIENT)
Dept: ONCOLOGY | Age: 45
End: 2020-01-01

## 2020-01-01 VITALS
TEMPERATURE: 98.8 F | DIASTOLIC BLOOD PRESSURE: 75 MMHG | OXYGEN SATURATION: 94 % | SYSTOLIC BLOOD PRESSURE: 142 MMHG | WEIGHT: 226 LBS | BODY MASS INDEX: 26.15 KG/M2 | RESPIRATION RATE: 18 BRPM | HEIGHT: 78 IN | HEART RATE: 91 BPM

## 2020-01-01 DIAGNOSIS — C92.10 CML (CHRONIC MYELOCYTIC LEUKEMIA) (HCC): ICD-10-CM

## 2020-01-01 DIAGNOSIS — I61.9 INTRAPARENCHYMAL HEMORRHAGE OF BRAIN (HCC): Primary | ICD-10-CM

## 2020-01-01 LAB
ABO + RH BLD: NORMAL
ALBUMIN SERPL-MCNC: 3.8 G/DL (ref 3.4–5)
ALBUMIN/GLOB SERPL: 0.7 {RATIO} (ref 0.8–1.7)
ALP SERPL-CCNC: 102 U/L (ref 45–117)
ALT SERPL-CCNC: 27 U/L (ref 16–61)
AMORPH CRY URNS QL MICRO: ABNORMAL
AMPHET UR QL SCN: NEGATIVE
ANION GAP SERPL CALC-SCNC: 8 MMOL/L (ref 3–18)
APAP SERPL-MCNC: <2 UG/ML (ref 10–30)
APPEARANCE UR: CLEAR
AST SERPL-CCNC: 34 U/L (ref 10–38)
ATRIAL RATE: 86 BPM
BACTERIA URNS QL MICRO: NEGATIVE /HPF
BARBITURATES UR QL SCN: NEGATIVE
BASOPHILS # BLD: 0 K/UL (ref 0–0.06)
BASOPHILS NFR BLD: 0 % (ref 0–3)
BENZODIAZ UR QL: NEGATIVE
BILIRUB SERPL-MCNC: 0.5 MG/DL (ref 0.2–1)
BILIRUB UR QL: NEGATIVE
BLASTS NFR BLD MANUAL: 9 %
BLOOD GROUP ANTIBODIES SERPL: NORMAL
BUN SERPL-MCNC: 12 MG/DL (ref 7–18)
BUN/CREAT SERPL: 6 (ref 12–20)
CALCIUM SERPL-MCNC: 9 MG/DL (ref 8.5–10.1)
CALCULATED P AXIS, ECG09: 47 DEGREES
CALCULATED R AXIS, ECG10: -13 DEGREES
CALCULATED T AXIS, ECG11: 2 DEGREES
CANNABINOIDS UR QL SCN: NEGATIVE
CHLORIDE SERPL-SCNC: 110 MMOL/L (ref 100–111)
CK MB CFR SERPL CALC: ABNORMAL % (ref 0–4)
CK MB SERPL-MCNC: <1 NG/ML (ref 5–25)
CK SERPL-CCNC: 73 U/L (ref 39–308)
CO2 SERPL-SCNC: 26 MMOL/L (ref 21–32)
COCAINE UR QL SCN: NEGATIVE
COLOR UR: YELLOW
CREAT SERPL-MCNC: 1.9 MG/DL (ref 0.6–1.3)
DIAGNOSIS, 93000: NORMAL
DIFFERENTIAL METHOD BLD: ABNORMAL
EOSINOPHIL # BLD: 5.2 K/UL (ref 0–0.4)
EOSINOPHIL NFR BLD: 1 % (ref 0–5)
EPITH CASTS URNS QL MICRO: ABNORMAL /LPF (ref 0–5)
ERYTHROCYTE [DISTWIDTH] IN BLOOD BY AUTOMATED COUNT: 22.1 % (ref 11.6–14.5)
ETHANOL SERPL-MCNC: <3 MG/DL (ref 0–3)
GLOBULIN SER CALC-MCNC: 5.3 G/DL (ref 2–4)
GLUCOSE BLD STRIP.AUTO-MCNC: 163 MG/DL (ref 70–110)
GLUCOSE SERPL-MCNC: 147 MG/DL (ref 74–99)
GLUCOSE UR STRIP.AUTO-MCNC: NEGATIVE MG/DL
HCT VFR BLD AUTO: 24.7 % (ref 36–48)
HDSCOM,HDSCOM: NORMAL
HGB BLD-MCNC: 8.9 G/DL (ref 13–16)
HGB UR QL STRIP: NEGATIVE
KETONES UR QL STRIP.AUTO: NEGATIVE MG/DL
LACTATE SERPL-SCNC: 1.3 MMOL/L (ref 0.4–2)
LEUKOCYTE ESTERASE UR QL STRIP.AUTO: NEGATIVE
LYMPHOCYTES # BLD: 15.6 K/UL (ref 0.8–3.5)
LYMPHOCYTES NFR BLD: 3 % (ref 20–51)
MCH RBC QN AUTO: 31 PG (ref 24–34)
MCHC RBC AUTO-ENTMCNC: 36 G/DL (ref 31–37)
MCV RBC AUTO: 86.1 FL (ref 74–97)
METAMYELOCYTES NFR BLD MANUAL: 10 %
METHADONE UR QL: NEGATIVE
MONOCYTES # BLD: 5.2 K/UL (ref 0–1)
MONOCYTES NFR BLD: 1 % (ref 2–9)
MYELOCYTES NFR BLD MANUAL: 23 %
NEUTS BAND NFR BLD MANUAL: 20 % (ref 0–5)
NEUTS SEG # BLD: 218.4 K/UL (ref 1.8–8)
NEUTS SEG NFR BLD: 22 % (ref 42–75)
NITRITE UR QL STRIP.AUTO: NEGATIVE
NRBC BLD-RTO: 4 PER 100 WBC
OPIATES UR QL: NEGATIVE
OTHER CELLS NFR BLD MANUAL: 4 %
P-R INTERVAL, ECG05: 158 MS
PATH REV BLD -IMP: ABNORMAL
PCP UR QL: NEGATIVE
PH UR STRIP: 5.5 [PH] (ref 5–8)
PLATELET # BLD AUTO: 85 K/UL (ref 135–420)
PLATELET COMMENTS,PCOM: ABNORMAL
POTASSIUM SERPL-SCNC: 2.7 MMOL/L (ref 3.5–5.5)
PROMYELOCYTES NFR BLD MANUAL: 7 %
PROT SERPL-MCNC: 9.1 G/DL (ref 6.4–8.2)
PROT UR STRIP-MCNC: 30 MG/DL
Q-T INTERVAL, ECG07: 396 MS
QRS DURATION, ECG06: 88 MS
QTC CALCULATION (BEZET), ECG08: 473 MS
RBC # BLD AUTO: 2.87 M/UL (ref 4.7–5.5)
RBC #/AREA URNS HPF: ABNORMAL /HPF (ref 0–5)
RBC MORPH BLD: ABNORMAL
RBC MORPH BLD: ABNORMAL
SALICYLATES SERPL-MCNC: 2.1 MG/DL (ref 2.8–20)
SODIUM SERPL-SCNC: 144 MMOL/L (ref 136–145)
SP GR UR REFRACTOMETRY: 1.01 (ref 1–1.03)
SPECIMEN EXP DATE BLD: NORMAL
TROPONIN I SERPL-MCNC: 0.05 NG/ML (ref 0–0.04)
UROBILINOGEN UR QL STRIP.AUTO: 1 EU/DL (ref 0.2–1)
VENTRICULAR RATE, ECG03: 86 BPM
WBC # BLD AUTO: 519.9 K/UL (ref 4.6–13.2)
WBC URNS QL MICRO: ABNORMAL /HPF (ref 0–4)

## 2020-01-01 PROCEDURE — 99291 CRITICAL CARE FIRST HOUR: CPT

## 2020-01-01 PROCEDURE — 82550 ASSAY OF CK (CPK): CPT

## 2020-01-01 PROCEDURE — 85025 COMPLETE CBC W/AUTO DIFF WBC: CPT

## 2020-01-01 PROCEDURE — 80307 DRUG TEST PRSMV CHEM ANLYZR: CPT

## 2020-01-01 PROCEDURE — 81001 URINALYSIS AUTO W/SCOPE: CPT

## 2020-01-01 PROCEDURE — 87040 BLOOD CULTURE FOR BACTERIA: CPT

## 2020-01-01 PROCEDURE — 71045 X-RAY EXAM CHEST 1 VIEW: CPT

## 2020-01-01 PROCEDURE — 82962 GLUCOSE BLOOD TEST: CPT

## 2020-01-01 PROCEDURE — 74011000250 HC RX REV CODE- 250: Performed by: EMERGENCY MEDICINE

## 2020-01-01 PROCEDURE — 74011250636 HC RX REV CODE- 250/636: Performed by: EMERGENCY MEDICINE

## 2020-01-01 PROCEDURE — 96361 HYDRATE IV INFUSION ADD-ON: CPT

## 2020-01-01 PROCEDURE — 96375 TX/PRO/DX INJ NEW DRUG ADDON: CPT

## 2020-01-01 PROCEDURE — 99285 EMERGENCY DEPT VISIT HI MDM: CPT

## 2020-01-01 PROCEDURE — 70450 CT HEAD/BRAIN W/O DYE: CPT

## 2020-01-01 PROCEDURE — 86900 BLOOD TYPING SEROLOGIC ABO: CPT

## 2020-01-01 PROCEDURE — 87186 SC STD MICRODIL/AGAR DIL: CPT

## 2020-01-01 PROCEDURE — 93005 ELECTROCARDIOGRAM TRACING: CPT

## 2020-01-01 PROCEDURE — 96367 TX/PROPH/DG ADDL SEQ IV INF: CPT

## 2020-01-01 PROCEDURE — 87077 CULTURE AEROBIC IDENTIFY: CPT

## 2020-01-01 PROCEDURE — 96365 THER/PROPH/DIAG IV INF INIT: CPT

## 2020-01-01 PROCEDURE — 80053 COMPREHEN METABOLIC PANEL: CPT

## 2020-01-01 PROCEDURE — 83605 ASSAY OF LACTIC ACID: CPT

## 2020-01-01 RX ORDER — IMATINIB MESYLATE 400 MG/1
TABLET, FILM COATED ORAL
Qty: 30 TAB | Refills: 6 | Status: SHIPPED | OUTPATIENT
Start: 2020-01-01 | End: 2020-01-01 | Stop reason: SDUPTHER

## 2020-01-01 RX ORDER — LEVETIRACETAM 10 MG/ML
1000 INJECTION INTRAVASCULAR EVERY 12 HOURS
Status: DISCONTINUED | OUTPATIENT
Start: 2020-01-01 | End: 2020-01-01 | Stop reason: HOSPADM

## 2020-01-01 RX ORDER — SODIUM CHLORIDE 0.9 % (FLUSH) 0.9 %
5-10 SYRINGE (ML) INJECTION AS NEEDED
Status: DISCONTINUED | OUTPATIENT
Start: 2020-01-01 | End: 2020-01-01 | Stop reason: HOSPADM

## 2020-01-01 RX ORDER — POTASSIUM CHLORIDE 7.45 MG/ML
10 INJECTION INTRAVENOUS
Status: COMPLETED | OUTPATIENT
Start: 2020-01-01 | End: 2020-01-01

## 2020-01-01 RX ORDER — IMATINIB MESYLATE 400 MG/1
400 TABLET, FILM COATED ORAL DAILY
Qty: 14 TAB | Refills: 0 | Status: SHIPPED | OUTPATIENT
Start: 2020-01-01 | End: 2020-03-27

## 2020-01-01 RX ADMIN — SODIUM CHLORIDE 1000 ML: 900 INJECTION, SOLUTION INTRAVENOUS at 23:19

## 2020-01-01 RX ADMIN — SODIUM CHLORIDE 1000 ML: 900 INJECTION, SOLUTION INTRAVENOUS at 23:37

## 2020-01-01 RX ADMIN — SODIUM CHLORIDE 1000 ML: 900 INJECTION, SOLUTION INTRAVENOUS at 22:59

## 2020-01-01 RX ADMIN — AZITHROMYCIN MONOHYDRATE 500 MG: 500 INJECTION, POWDER, LYOPHILIZED, FOR SOLUTION INTRAVENOUS at 23:01

## 2020-01-01 RX ADMIN — LEVETIRACETAM 1000 MG: 10 INJECTION INTRAVENOUS at 23:18

## 2020-01-01 RX ADMIN — POTASSIUM CHLORIDE 10 MEQ: 7.46 INJECTION, SOLUTION INTRAVENOUS at 23:00

## 2020-01-01 RX ADMIN — CEFTRIAXONE SODIUM 2 G: 2 INJECTION, POWDER, FOR SOLUTION INTRAMUSCULAR; INTRAVENOUS at 22:59

## 2020-01-07 NOTE — PROGRESS NOTES
Patient called the [facility] bairon asking if we had a status for the [address] refill. I called Carmen and Prieto Nolan stated they have been trying to reach the patient to schedule a delivery, but the patient has not contacted them as of today. I left the patient a message w/phone number to contact [facility]en to arrange for delivery.

## 2020-03-23 NOTE — ED NOTES
Transport team in , transferred to [facility] then to [facility].

## 2020-03-23 NOTE — ED NOTES
S/p CT resutls to Dr. Rosendo Traylor, plan transfer to [facility] for Multi- Brain Bleed.

## 2020-03-23 NOTE — ED PROVIDER NOTES
EMERGENCY DEPARTMENT HISTORY AND PHYSICAL EXAM 
 
9:34 PM 
 
 
Date: 3/22/2020 Patient Name: Shann Koyanagi History of Presenting Illness Chief Complaint Patient presents with  Vomiting  Altered mental status History Provided By: Family member Additional History (Context): Shann Koyanagi is a 38 y.o. male with malignancy who presents with altered mental status. Patient was dropped off by his aunt who did not have any history. Patient's aunt says he is not acting well. Patient is extremely confused and no further history is able to be obtained. Obtain some history from the father. The father states the patient was at his grandmother's house today developed 2 episodes of vomiting and some slurred speech. The last known time normal was around 5-6 o'clock. No other information provided. He is coming to the ED. 9:45PM 
 
PCP: Eduardo Cannon MD 
 
 
 
Current Facility-Administered Medications Medication Dose Route Frequency Provider Last Rate Last Dose  sodium chloride (NS) flush 5-10 mL  5-10 mL IntraVENous PRN Raghu Willoughby DO      
 cefTRIAXone (ROCEPHIN) 2 g in sterile water (preservative free) 20 mL IV syringe  2 g IntraVENous Q24H Michael Willoughby DO   2 g at 03/22/20 2259  
 azithromycin (ZITHROMAX) 500 mg in  mL  500 mg IntraVENous Q24H Michael Willoughby DO   Stopped at 03/23/20 0020  
 sodium chloride 0.9 % bolus infusion 236 mL  236 mL IntraVENous ONCE Raghu Willoughby DO      
 . Please enter patient's height and weight. Thank you. 1 Each Other Rx Dosing/Monitoring Raghu Willoughby DO      
 levETIRAcetam in saline (iso-os) (KEPPRA) infusion 1,000 mg  1,000 mg IntraVENous Q12H Raghu Willoughby DO   Stopped at 03/23/20 0000 Current Outpatient Medications Medication Sig Dispense Refill  imatinib (GLEEVEC) 400 mg tablet Take 1 Tab by mouth daily for 14 days. 14 Tab 0  pseudoephedrine (SUDAFED) 30 mg tablet Take 1 Tab by mouth every four (4) hours as needed for Congestion. 16 Tab 0  
 HYDROcodone-acetaminophen (NORCO) 5-325 mg per tablet Take 1 Tab by mouth every four (4) hours as needed for Pain. Max Daily Amount: 6 Tabs. 30 Tab 0  
 allopurinol (ZYLOPRIM) 100 mg tablet Take 3 Tabs by mouth daily. 30 Tab 3  
 ferrous sulfate 325 mg (65 mg iron) tablet Take 1 Tab by mouth daily (with breakfast). 30 Tab 3 Past History Past Medical History: 
Past Medical History:  
Diagnosis Date  Anemia  Arthritis Right knee  CML (chronic myelocytic leukemia) ([facility] [address]  STD (sexually transmitted disease) Past Surgical History: 
Past Surgical History:  
Procedure Laterality Date  HX KNEE ARTHROSCOPY Family History: 
Family History Problem Relation Age of Onset  Hypertension Maternal Grandmother Social History: 
Social History Tobacco Use  Smoking status: Current Every Day Smoker Packs/day: 0.25 Years: 8.00 Pack years: 2.00 Types: Cigarettes  Smokeless tobacco: Never Used Substance Use Topics  Alcohol use: No  
 Drug use: No  
 
 
Allergies: 
No Known Allergies Review of Systems Review of Systems Constitutional: Negative. Negative for chills, diaphoresis and fever. HENT: Negative. Negative for congestion, rhinorrhea and sore throat. Eyes: Negative. Negative for pain, discharge and redness. Respiratory: Negative. Negative for cough, chest tightness, shortness of breath and wheezing. Cardiovascular: Negative. Negative for chest pain. Gastrointestinal: Negative. Negative for abdominal pain, constipation, diarrhea, nausea and vomiting. Genitourinary: Negative. Negative for dysuria, flank pain, frequency, hematuria and urgency. Musculoskeletal: Negative. Negative for back pain and neck pain. Skin: Negative. Negative for rash. Neurological: Negative. Negative for syncope, weakness, numbness and headaches. Psychiatric/Behavioral: Negative. All other systems reviewed and are negative. Physical Exam  
 
Visit Vitals /75 Pulse 91 Temp 98.8 °F (37.1 °C) Resp 18 Ht 6' 7\" (2.007 m) Wt 102.5 kg (226 lb) SpO2 94% BMI 25.46 kg/m² Physical Exam 
Constitutional:   
   Appearance: Normal appearance. HENT:  
   Head: Normocephalic and atraumatic. Nose: Nose normal.  
   Mouth/Throat:  
   Mouth: Mucous membranes are moist.  
Eyes:  
   Extraocular Movements: Extraocular movements intact. Pupils: Pupils are equal, round, and reactive to light. Cardiovascular:  
   Rate and Rhythm: Normal rate and regular rhythm. Pulses: Normal pulses. Pulmonary:  
   Effort: Pulmonary effort is normal.  
   Breath sounds: Normal breath sounds. Abdominal:  
   General: Abdomen is flat. Bowel sounds are normal.  
Musculoskeletal: Normal range of motion. Skin: 
   General: Skin is warm. Capillary Refill: Capillary refill takes less than 2 seconds. Neurological:  
   Mental Status: He is disoriented and confused. GCS: GCS eye subscore is 4. GCS verbal subscore is 3. GCS motor subscore is 5. Motor: Weakness present. Diagnostic Study Results Labs - Recent Results (from the past 12 hour(s)) GLUCOSE, POC Collection Time: 03/22/20  9:23 PM  
Result Value Ref Range Glucose (POC) 163 (H) 70 - 110 mg/dL EKG, 12 LEAD, INITIAL Collection Time: 03/22/20  9:28 PM  
Result Value Ref Range Ventricular Rate 86 BPM  
 Atrial Rate 86 BPM  
 P-R Interval 158 ms QRS Duration 88 ms Q-T Interval 396 ms QTC Calculation (Bezet) 473 ms Calculated P Axis 47 degrees Calculated R Axis -13 degrees Calculated T Axis 2 degrees Diagnosis Sinus rhythm with marked sinus arrhythmia with occasional premature  
ventricular complexes Minimal voltage criteria for LVH, may be normal variant Nonspecific T wave abnormality Abnormal ECG No previous ECGs available CBC WITH AUTOMATED DIFF Collection Time: 03/22/20  9:40 PM  
Result Value Ref Range .9 (HH) 4.6 - 13.2 K/uL  
 RBC 2.87 (L) 4.70 - 5.50 M/uL HGB 8.9 (L) 13.0 - 16.0 g/dL HCT 24.7 (L) 36.0 - 48.0 % MCV 86.1 74.0 - 97.0 FL  
 MCH 31.0 24.0 - 34.0 PG  
 MCHC 36.0 31.0 - 37.0 g/dL RDW 22.1 (H) 11.6 - 14.5 % PLATELET 85 (L) 194 - 420 K/uL NEUTROPHILS PENDING % LYMPHOCYTES PENDING % MONOCYTES PENDING % EOSINOPHILS PENDING % BASOPHILS PENDING %  
 ABS. NEUTROPHILS PENDING K/UL  
 ABS. LYMPHOCYTES PENDING K/UL  
 ABS. MONOCYTES PENDING K/UL  
 ABS. EOSINOPHILS PENDING K/UL  
 ABS. BASOPHILS PENDING K/UL  
 DF PENDING   
METABOLIC PANEL, COMPREHENSIVE Collection Time: 03/22/20  9:40 PM  
Result Value Ref Range Sodium 144 136 - 145 mmol/L Potassium 2.7 (LL) 3.5 - 5.5 mmol/L Chloride 110 100 - 111 mmol/L  
 CO2 26 21 - 32 mmol/L Anion gap 8 3.0 - 18 mmol/L Glucose 147 (H) 74 - 99 mg/dL BUN 12 7.0 - 18 MG/DL Creatinine 1.90 (H) 0.6 - 1.3 MG/DL  
 BUN/Creatinine ratio 6 (L) 12 - 20 GFR est AA 47 (L) >60 ml/min/1.73m2 GFR est non-AA 39 (L) >60 ml/min/1.73m2 Calcium 9.0 8.5 - 10.1 MG/DL Bilirubin, total 0.5 0.2 - 1.0 MG/DL  
 ALT (SGPT) 27 16 - 61 U/L  
 AST (SGOT) 34 10 - 38 U/L Alk. phosphatase 102 45 - 117 U/L Protein, total 9.1 (H) 6.4 - 8.2 g/dL Albumin 3.8 3.4 - 5.0 g/dL Globulin 5.3 (H) 2.0 - 4.0 g/dL A-G Ratio 0.7 (L) 0.8 - 1.7 ETHYL ALCOHOL Collection Time: 03/22/20  9:40 PM  
Result Value Ref Range ALCOHOL(ETHYL),SERUM <3 0 - 3 MG/DL  
SALICYLATE Collection Time: 03/22/20  9:40 PM  
Result Value Ref Range Salicylate level 2.1 (L) 2.8 - 20.0 MG/DL  
CARDIAC PANEL,(CK, CKMB & TROPONIN) Collection Time: 03/22/20  9:40 PM  
Result Value Ref Range CK 73 39 - 308 U/L  
 CK - MB <1.0 <3.6 ng/ml CK-MB Index  0.0 - 4.0 % CALCULATION NOT PERFORMED WHEN RESULT IS BELOW LINEAR LIMIT Troponin-I, QT 0.05 (H) 0.0 - 0.045 NG/ML  
ACETAMINOPHEN Collection Time: 03/22/20  9:40 PM  
Result Value Ref Range Acetaminophen level <2 (L) 10.0 - 30.0 ug/mL LACTIC ACID Collection Time: 03/22/20  9:55 PM  
Result Value Ref Range Lactic acid 1.3 0.4 - 2.0 MMOL/L  
TYPE & SCREEN Collection Time: 03/22/20 10:40 PM  
Result Value Ref Range Crossmatch Expiration 03/25/2020 ABO/Rh(D) B POSITIVE Antibody screen NEG   
URINALYSIS W/ RFLX MICROSCOPIC Collection Time: 03/23/20 12:30 AM  
Result Value Ref Range Color YELLOW Appearance CLEAR Specific gravity 1.015 1.005 - 1.030    
 pH (UA) 5.5 5.0 - 8.0 Protein 30 (A) NEG mg/dL Glucose NEGATIVE  NEG mg/dL Ketone NEGATIVE  NEG mg/dL Bilirubin NEGATIVE  NEG Blood NEGATIVE  NEG Urobilinogen 1.0 0.2 - 1.0 EU/dL Nitrites NEGATIVE  NEG Leukocyte Esterase NEGATIVE  NEG    
URINE MICROSCOPIC ONLY Collection Time: 03/23/20 12:30 AM  
Result Value Ref Range WBC 0 to 3 0 - 4 /hpf  
 RBC NONE 0 - 5 /hpf Epithelial cells FEW 0 - 5 /lpf Bacteria NEGATIVE  NEG /hpf Amorphous Crystals 1+ (A) NEG Radiologic Studies -  
XR CHEST PORT Final Result Impression: 1. Perhaps mild bronchial wall thickening. 2. Slight prominence of the thoracic aortic shadow, probably projectional  
artifact. If there is clinical concern for acute aortic pathology, CTA chest may  
be helpful for further evaluation. CT HEAD WO CONT Final Result IMPRESSION:  
  
1. Multiple bilateral intraparenchymal hemorrhage and/or hyperdense masses, as  
described. -Multiplicity of possible hemorrhagic masses versus hyperdense masses suggest  
metastasis. -MRI may be helpful for further evaluation. 2. There is approximately 4-5 mm rightward deviation of midline structures at  
the level of the massa intermedia. 3. Trace right mastoid effusion. Findings regarding 1-2 of the above impression was communicated to Dr. Judie Verma  
at [id number] hours 3/22/2020 Medical Decision Making Provider Notes (Medical Decision Making): [facility] I am the first provider for this patient. I reviewed the vital signs, available nursing notes, past medical history, past surgical history, family history and social history. Vital Signs-Reviewed the patient's vital signs. Records Reviewed: Nursing Notes (Time of Review: 9:34 PM) 
 
ED Course: Progress Notes, Reevaluation, and Consults: 
 
Consult:  Discussed care with Dr Ashley Izaguirre. Telenuerologist. Standard discussion; including history of patients chief complaint, available diagnostic results, and treatment course. We will see patient in ED. 
9:49 PM 
 
Labs essentially normal with the exception of WBC of 519.9, hgb 8.9, K 2.7, etoh negative, salicylates 2.1, troponin 0.05, acetaminophen 2Chest X-Ray showed No acute process. EKG showed SR with rate of 86 bpm. With no ST elevations or depression and non specific T wave changes. CT Head showed Multiple bilateral intraparenchymal hemorrhage and/or hyperdense masses, as 
described. -Multiplicity of possible hemorrhagic masses versus hyperdense masses suggest 
metastasis. -MRI may be helpful for further evaluation. 2. There is approximately 4-5 mm rightward deviation of midline structures at 
the level of the massa intermedia. 3. Trace right mastoid effusion. * 
10:34 PM 3/22/2020 Consult:  Discussed care with Dr Mary Anne Munson. Neurosurgery. Standard discussion; including history of patients chief complaint, available diagnostic results, and treatment course. Dates the patient is not a candidate for surgery at this time. Would like patient transferred to [facility] [place] ICU. 
10:54 PM 
 
Consult:  Discussed care with Dr Joyce Christian.  Intensivist. Standard discussion; including history of patients chief complaint, available diagnostic results, and treatment course. Accepting physician. 11:04 PM 
CRITICAL CARE: 
 
1:28 AM 
I have spent 70 minutes of critical care time involved in lab review, consultations with specialist, family decision-making, and documentation. During this entire length of time I was immediately available to the patient. Critical Care: The reason for providing this level of medical care for this critically ill patient was due a critical illness that impaired one or more vital organ systems such that there was a high probability of imminent or life threatening deterioration in the patients condition. This care involved high complexity decision making to assess, manipulate, and support vital system functions, to treat this degreee vital organ system failure and to prevent further life threatening deterioration of the patients condition. Diagnosis Clinical Impression:  
1. Intraparenchymal hemorrhage of brain ([facility] [address] Disposition: Transfer to [facility] Provider Attestation:    
I personally performed the services described in the documentation, reviewed the documentation and it accurately and completely records my words and actions utilizing the [address] March 23, 2020 at 1:28 AM - Luís Willoughby DO Disclaimer. It is dictated using utilizing voice recognition software. Unfortunately this leads to occasional typographical errors. I apologize in advance if the situation occurs. If questions arise please do not hesitate to contact me or call our department.

## 2020-03-23 NOTE — ED TRIAGE NOTES
Patient is confused, presented to ED with altered mental status x today along with slurred speech, N/V. As per patient's family member, patient has been confused all day. Patient is diaphoretic, not answering any questions appropriately at this time.

## 2020-03-24 NOTE — ED NOTES
Received a call from microbiology about positive blood cultures Spoke to sister informed her that there gram-positive cocci in groups and blood culture. Patient is already hospitalized in [facility] and receiving care there.

## 2020-03-25 ENCOUNTER — TELEPHONE (OUTPATIENT)
Dept: ONCOLOGY | Age: 45
End: 2020-03-25

## 2020-03-25 LAB
BACTERIA SPEC CULT: ABNORMAL
GRAM STN SPEC: ABNORMAL
GRAM STN SPEC: ABNORMAL
SERVICE CMNT-IMP: ABNORMAL

## 2020-03-25 NOTE — TELEPHONE ENCOUNTER
[facility]. we have prescription for imatinib requiresd a PA from insurance. Have been notified by insurance that PA was denied. Office should have received denial letter with exact reasons why denied. Following up to see if office is going to do an appeal. Have a team of nurses that can help draft letter for appeals that can be sent to the plan. We just need you to fax all denial info as well as critical notes to show why pt needs this medication to  [phone] [employer] as urgent. Could also intimate the authorization appeal by your self. WIll keep prescription on hold til we hear back from office that it has been approved. Call  [phone] option 2.

## 2020-03-25 NOTE — TELEPHONE ENCOUNTER
Received call from Robel Glynn at [address] pt passed away at [place] 03/24/2020. Will call back if needs Dr. Álvaro Garcia to sign death certificate.

## 2020-03-28 LAB
BACTERIA SPEC CULT: NORMAL
SERVICE CMNT-IMP: NORMAL

## 2024-06-21 NOTE — PROGRESS NOTES
NUTRITION    BPA/MST Referral       RECOMMENDATIONS / PLAN:     - Add nutrition supplement: Ensure Enlive BID  - Continue RD inpatient monitoring and evaluation. NUTRITION INTERVENTIONS & DIAGNOSIS:     [x] Meals/Snacks: modified diet  [x] Medical food supplementation: add     Nutrition Diagnosis:  Unintentional weight loss related to inadequate energy intake as evidenced by weight loss of 60 lbs in past 6 months PTA    ASSESSMENT:     Subjective/Objective:   Patient reported having good appetite and meal intake PTA. Was NPO earlier today; diet recently started. Finished about 25% of lunch meal, but was in room eating food brought by family. Pt experienced significant weight loss PTA.  Discussed adding nutrition supplement; pt agreed with plan    Average po intake adequate to meet patients estimated nutritional needs:   [x] Yes     [] No   [] Unable to determine at this time    Diet: DIET REGULAR      Food Allergies: none known   Current Appetite:   [x] Good     [] Fair     [] Poor     [] Other:  Appetite/meal intake prior to admission:   [x] Good     [] Fair     [] Poor     [] Other:  Feeding Limitations:  [] Swallowing difficulty    [] Chewing difficulty    [] Other:  Current Meal Intake: Patient Vitals for the past 100 hrs:   % Diet Eaten   03/14/17 1345 25 %   03/14/17 0956 0 %       BM:  3/13  Skin Integrity:  No pressure ulcer or wound  Edema: 3+ RLE  Pertinent Medications: Reviewed    Recent Labs      03/13/17   1303   NA  140   K  3.6   CL  105   CO2  30   GLU  83   BUN  16   CREA  1.77*   CA  8.5   MG  2.1   ALB  3.3*   SGOT  19   ALT  20       Intake/Output Summary (Last 24 hours) at 03/14/17 1711  Last data filed at 03/14/17 1345   Gross per 24 hour   Intake              240 ml   Output                0 ml   Net              240 ml       Anthropometrics:  Ht Readings from Last 1 Encounters:   03/13/17 6' 8\" (2.032 m)     Last 3 Recorded Weights in this Encounter    03/13/17 1116   Weight: 104.3 kg (230 Outpatient Wound Clinician Visit Note    Chief Complaint:   Chief Complaint   Patient presents with    Wound     The below orders were released and performed during the visit: Nursing Orders  Complete Wound Care 2 Wounds Associated  Every visit  Diagnosis: Venous insufficiency  Wound location: BLE  Dressing change(s) to be done by: Wound Care Team  Dressing frequency: Two times/week (specify)  Dressing change(s) to be done using: Clean Technique  Clean wound with: Wound cleanser  Clean wound with: Soap (not antimicrobial or scented) and water  Wound compression: Other (specify)  Other (specify): SEE COMMENT  Compression for extremity: Bilateral lower legs  Ambulatory Labs  Anaerobe/Aerobe, Bacterial Culture With Gram Stain  PRN  Additional specimen description: LEFT MEDIAL ANKLE WOUND    Order Comments:    LLE VENOUS ULCERS: TRIAD, HFBRT, ROLL GAUZE, 2 LAYER COBAN COMPRESSION. CHANGE TWICE WEEKLY., RLE SINGLE LAYER TUBIGRIP MEDIUM., Lidocaine 2% gel topically as needed for pain in wound center only., Silver nitrate topically for hypergranulation or persistent bleeding during dressing change in wound center only.     Treament Applied by: Sera Duque RN    TRANSCRIBED BY Radha Che RN, CWCN, OMS    Arrival Disposition: Ambulates  Transfer Assist: None  Departure Instruction: Simple D/C (Rx, simple instructions)  Departure Disposition: Depart without assistance     LLE - Left Lower Extremity  LLE Capillary Refill Time: Less than 3 seconds  LLE Color and Temperature: Warm, Brown  LLE Pulse - Pedal : Normal  LLE Circumference - Calf (cm): 43.5 cm  LLE Circumference - Ankle (cm): 25 cm  LLE Circumference - Foot (cm): 26 cm    RLE - Right Lower Extremity  RLE Capillary Refill Time: Less than 3 seconds  RLE Color and Temperature: Warm, Brown    Wound Ankle Left Medial Venous Ulcer (Active)   Date First Assessed/Time First Assessed: 06/13/24 0900   Present on Original Admission: Yes  Location: Ankle   Laterality: Left  Modifier: Medial  Level of Skin Injury: Full Thickness  Primary Wound Type: Venous Ulcer  Wound Approximate Age at First As...      Assessments 6/21/2024  8:15 AM   Wound Image      Wound Care Team Consult Date 06/13/24   Present at Time of This Admission Yes   Level of Skin Injury Assessment Full Thickness   Dressing Activity Applied   Wound Exudate Moderate;Serosanguineous;No odor   Cleansing Agent Commercial cleansing solution   Wound Bed/Tissue Type Epithelialized;Granulated;Necrotic tissue, slough   Wound Bed % Granulated 80 %   Wound Bed % Epithelialized 20 %   Periwound Condition Edema;Hyperpigmented   Wound Edge Poorly defined   Topical Agent Lidocaine   Wound Dressing Other (comment) (TRIAD, HFBRT, ROLL GAUZE)   Wound Compression 2 layer wrap   Wound Last Measured 06/13/24   Wound Length (cm) 1.2 cm   Wound Width (cm) 1.5 cm   Wound Depth (cm) 0.2 cm   Wound Surface Area (cm^2) 1.8 cm^2   Wound Volume (cm^3) 0.36 cm^3   PhotoTaken? Yes   Debridement Selective - Conservative Sharp Selective - Ultrasound (PERFORMED BY DR. SHARMA)   Debridement Percentage (%) 100   Post-Procedure Length (cm) 1.2 cm   Post-Procedure Width (cm) 1.5 cm   Post-Procedure Depth (cm) 0.3 cm   Post-Procedure Surface Area (cm^2) 1.8 cm^2   Post-Procedure Volume (cm^3) 0.54 cm^3   Wound Volume Change (Initial) -0.09 cm3   Wound Volume % Change (Initial) -20 %   Post-Procedure Volume Change (Initial) 0.09 cm3   Post-Procedure Volume % Change (Initial) 20 %       Wound Ankle Left Lateral Venous Ulcer (Active)   Date First Assessed/Time First Assessed: 06/13/24 0900   Present on Original Admission: Yes  Location: Ankle  Laterality: Left  Modifier: Lateral  Level of Skin Injury: Full Thickness  Primary Wound Type: Venous Ulcer  Wound Approximate Age at First A...      Assessments 6/21/2024  8:15 AM   Wound Image     Wound Care Team Consult Date 06/13/24   Present at Time of This Admission Yes   Level of Skin Injury  lb)     Body mass index is 25.27 kg/(m^2). Weight History:  Pt reported initial plan for weight loss, but then experienced unplanned weight of 60 lb (20.7%) in past 6 months PTA    Weight Metrics 3/13/2017 10/10/2016 9/17/2016   Weight 230 lb 230 lb 230 lb   BMI 25.27 kg/m2 25.27 kg/m2 25.27 kg/m2        Admitting Diagnosis: Leukocytosis  Knee effusion, right  Past Medical History:   Diagnosis Date    STD (sexually transmitted disease)        Education Needs:        [x] None identified  [] Identified - Not appropriate at this time  []  Identified and addressed - refer to education log  Learning Limitations:   [x] None identified  [] Identified    Cultural, Confucianist & ethnic food preferences:  [x] None identified    [] Identified and addressed     ESTIMATED NUTRITION NEEDS:     Calories: 8178-7080 kcal (MSJx1.2-1.3) based on  [x] Actual BW:  104 kg    [] IBW:   Protein:  gm (0.8-1 gm/kg) based on  [x] Actual BW:  104 kg    [] IBW:   Fluid: 1 mL/kcal     MONITORING & EVALUATION:     Nutrition Goal(s):   1. Po intake of meals will meet >75% of patient estimated nutritional needs within the next 7 days.   Outcome:  [] Met/Ongoing    []  Not Met    [x] New/Initial Goal     Monitoring:   [x] Diet tolerance   [x] Meal intake   [x] Supplement intake   [] GI symptoms/ability to tolerate po diet   [] Respiratory status   [] Plan of care      Previous Recommendations (for follow-up assessments only):     []   Implemented       []   Not Implemented (RD to address)     [] No Recommendation Made     Discharge Planning:  Regular diet   [x] Participated in care planning, discharge planning, & interdisciplinary rounds as appropriate      Amie Valverde, [address]   Pager: [phone] Assessment Full Thickness   Dressing Activity Changed   Wound Exudate Moderate;Serosanguineous;No odor   Cleansing Agent Commercial cleansing solution   Wound Bed/Tissue Type Granulated;Epithelialized;Necrotic tissue, slough   Wound Bed % Granulated 80 %   Wound Bed % Necrotic Tissue Slough 20 %   Periwound Condition Edema;Hyperpigmented   Wound Edge Poorly defined;Attached to wound bed   Topical Agent Lidocaine   Wound Dressing Other (comment) (TRIAD, HFBRT, ROLL GAUZE)   Wound Compression 2 layer wrap   Wound Last Measured 06/13/24   Wound Length (cm) 1.9 cm   Wound Width (cm) 1.5 cm   Wound Depth (cm) 0.1 cm   Wound Surface Area (cm^2) 2.85 cm^2   Wound Volume (cm^3) 0.285 cm^3   PhotoTaken? Yes   Wound Volume Change (Initial) 0.24 cm3   Wound Volume % Change (Initial) 533.33 %       Date of Procedure:  6/21/2024     Procedure: Application of a multi-layer compression bandage.    I have informed the patient of the risks and benefits of the procedure and the patient had the opportunity to ask questions.  Procedure was performed in a clean field.  Multi-layer compression kit 2 layer was applied per manufacture's instructions to left lower extremity.  Patient tolerated the procedure well without complaints of pain or discomfort.  Patient was educated regarding signs and symptoms of over compression, including unrelieved pain, toes turning red or purple, or numbness in foot.  Patient was instructed to manually remove outer layer without scissors and to contact clinic immediately if patient experienced any of those symptoms.